# Patient Record
Sex: FEMALE | Race: OTHER | NOT HISPANIC OR LATINO | Employment: FULL TIME | URBAN - METROPOLITAN AREA
[De-identification: names, ages, dates, MRNs, and addresses within clinical notes are randomized per-mention and may not be internally consistent; named-entity substitution may affect disease eponyms.]

---

## 2017-01-13 ENCOUNTER — CLINICAL SUPPORT (OUTPATIENT)
Dept: OPHTHALMOLOGY | Facility: CLINIC | Age: 47
End: 2017-01-13
Payer: MEDICARE

## 2017-01-13 ENCOUNTER — OFFICE VISIT (OUTPATIENT)
Dept: OPHTHALMOLOGY | Facility: CLINIC | Age: 47
End: 2017-01-13
Payer: MEDICARE

## 2017-01-13 DIAGNOSIS — H47.639: Primary | ICD-10-CM

## 2017-01-13 DIAGNOSIS — H40.053 OCULAR HYPERTENSION, BILATERAL: ICD-10-CM

## 2017-01-13 DIAGNOSIS — H40.023 OPEN ANGLE WITH BORDERLINE FINDINGS AND HIGH GLAUCOMA RISK IN BOTH EYES: ICD-10-CM

## 2017-01-13 DIAGNOSIS — H53.453 LOSS OF PERIPHERAL VISUAL FIELD, BILATERAL: ICD-10-CM

## 2017-01-13 DIAGNOSIS — H47.20: ICD-10-CM

## 2017-01-13 DIAGNOSIS — H40.023 OAG (OPEN ANGLE GLAUCOMA) SUSPECT, HIGH RISK, BILATERAL: ICD-10-CM

## 2017-01-13 PROCEDURE — 92014 COMPRE OPH EXAM EST PT 1/>: CPT | Mod: S$GLB,,, | Performed by: OPHTHALMOLOGY

## 2017-01-13 PROCEDURE — 92083 EXTENDED VISUAL FIELD XM: CPT | Mod: S$GLB,,, | Performed by: OPHTHALMOLOGY

## 2017-01-13 PROCEDURE — 99999 PR PBB SHADOW E&M-EST. PATIENT-LVL II: CPT | Mod: PBBFAC,,, | Performed by: OPHTHALMOLOGY

## 2017-01-13 PROCEDURE — 92250 FUNDUS PHOTOGRAPHY W/I&R: CPT | Mod: S$GLB,,, | Performed by: OPHTHALMOLOGY

## 2017-01-13 RX ORDER — CLOSTRIDIUM TETANI TOXOID ANTIGEN (FORMALDEHYDE INACTIVATED), CORYNEBACTERIUM DIPHTHERIAE TOXOID ANTIGEN (FORMALDEHYDE INACTIVATED), BORDETELLA PERTUSSIS TOXOID ANTIGEN (GLUTARALDEHYDE INACTIVATED), BORDETELLA PERTUSSIS FILAMENTOUS HEMAGGLUTININ ANTIGEN (FORMALDEHYDE INACTIVATED), BORDETELLA PERTUSSIS PERTACTIN ANTIGEN, AND BORDETELLA PERTUSSIS FIMBRIAE 2/3 ANTIGEN 5; 2; 2.5; 5; 3; 5 [LF]/.5ML; [LF]/.5ML; UG/.5ML; UG/.5ML; UG/.5ML; UG/.5ML
INJECTION, SUSPENSION INTRAMUSCULAR
COMMUNITY
Start: 2016-12-15 | End: 2018-11-13 | Stop reason: ALTCHOICE

## 2017-01-13 RX ORDER — CYCLOBENZAPRINE HCL 10 MG
TABLET ORAL
COMMUNITY
Start: 2016-12-24 | End: 2018-11-13 | Stop reason: ALTCHOICE

## 2017-01-13 RX ORDER — NAPROXEN 500 MG/1
TABLET ORAL
COMMUNITY
Start: 2016-12-24 | End: 2018-11-13 | Stop reason: ALTCHOICE

## 2017-01-13 NOTE — PROGRESS NOTES
HPI     DLS: 7/13/12    Pt here for HVF review;  Pt states she sees floaters, but no flashes.     Meds: No GTTS    1. 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         Assessment /Plan     For exam results, see Encounter Report.    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  [place] bitemp hemianopsia. Stable     Family history   + mother  Glaucoma meds   none  H/O adverse rxn to glaucoma drops  none  LASERS   none  GLAUCOMA SURGERIES   none  OTHER EYE SURGERIES   none  CDR  0.6/0.5  Tbase  16-20 ou    Tmax  20 ou      Ttarget   ?       HVF  incong bitemp hemianopsia. Stable  Gonio +3-4 ou  CCT  479/476  OCT  Dec RNFL throughout ou  HRT  ?  Disc photos  8/17/09    Ttoday  20//24 ou  Test done today VF/OCT. See above     2. Optic atrophy, unspecified 2/2 to above.   Stable  Poor vision OU. Sees [facility] [place] [facility]. [facility]ses [facility]   3. Borderline glaucoma with ocular hypertension -VF loss 2/2 #1. Monitor closely due to family hx and thin CCT. Preexisting VF loss          Plan  Cont observe  VF stable  RTC - 9 mos  HVF,oct, DFE in 1 yr.- If IOP goes up higher can start gtts (++ FmHx - mom - Ms Raquel Vaca - use to be my patietn - she has passed) - try and locate old photo file if possible - missing 1/2017      Back to [facility] [place] [facility] for refraction - old pt of his    I have seen and personally examined the patient.  I agree with the findings, assessment and plan of the resident and/or fellow.     Lorena Chakraborty MD

## 2017-01-16 ENCOUNTER — TELEPHONE (OUTPATIENT)
Dept: NEUROSURGERY | Facility: CLINIC | Age: 47
End: 2017-01-16

## 2017-01-16 NOTE — TELEPHONE ENCOUNTER
Unable to reach patient, phone not working/taking messages. Pt will meed a new MRI from PCP to be evaluated. Have not seen pt in clinic since January 2011. Dr Funez would prerfer to evaluate her with a current MRI.

## 2017-01-16 NOTE — TELEPHONE ENCOUNTER
----- Message from Chemo Montes sent at 1/13/2017  4:17 PM CST -----  Contact: Self [phone]  Patient is requesting a return call from [facility], the last MRI was 2014 at [facility], pankaj call

## 2017-01-24 ENCOUNTER — OFFICE VISIT (OUTPATIENT)
Dept: OPTOMETRY | Facility: CLINIC | Age: 47
End: 2017-01-24
Payer: MEDICARE

## 2017-01-24 DIAGNOSIS — H54.3 LOW VISION, BOTH EYES: Primary | ICD-10-CM

## 2017-01-24 DIAGNOSIS — H52.13 MYOPIA OF BOTH EYES: ICD-10-CM

## 2017-01-24 PROCEDURE — 99999 PR PBB SHADOW E&M-EST. PATIENT-LVL II: CPT | Mod: PBBFAC,,, | Performed by: OPTOMETRIST

## 2017-01-24 PROCEDURE — 92015 DETERMINE REFRACTIVE STATE: CPT | Mod: S$GLB,,, | Performed by: OPTOMETRIST

## 2017-01-24 PROCEDURE — 99499 UNLISTED E&M SERVICE: CPT | Mod: S$GLB,,, | Performed by: OPTOMETRIST

## 2017-01-24 NOTE — PROGRESS NOTES
HPI     Blurred Vision    Additional comments: Low vision           Comments   DLS:  1/13/17 Dr Chakraborty   Pt here for refraction per Dr Chakraborty . Pt states she is having blurry   vision OU.  Pt states she has blind spots in her vision.  Pt states off   and on pain OU (2 on scale).  Wants more contacts for distance, glasses also  Wants to go back to B  No eyedrops       Last edited by Moises Gillespie, OD on 1/24/2017 11:28 AM. (History)        ROS     Negative for: Constitutional, Gastrointestinal, Neurological, Skin,   Genitourinary, Musculoskeletal, HENT, Endocrine, Cardiovascular, Eyes,   Respiratory, Psychiatric, Allergic/Imm, Heme/Lymph    Last edited by Moises Gillespie, OD on 1/24/2017  9:14 AM. (History)        Assessment /Plan     For exam results, see Encounter Report.    Low vision, both eyes    Myopia of both eyes      1,2. Gave spec rx and contact lens rx for distance.  Contact lens trials dispensed to pt. Daily wear only advised, with education to risks of extended wear.  Discussed lens care, compliance and solutions.  Refer back to [facility] to rekha for devices , uses large tablet with reverse contrast for near reading with good success.

## 2017-01-31 ENCOUNTER — TELEPHONE (OUTPATIENT)
Dept: NEUROSURGERY | Facility: CLINIC | Age: 47
End: 2017-01-31

## 2017-01-31 NOTE — TELEPHONE ENCOUNTER
DARRION PT, DISCUSSED CONTACTING PCP TO OBTAIN A CURRENT MRI WITHOUT AND WITH CONTRAST. SHE WILL CONTACT ME WHEN THIS IS SCHEDULED.

## 2017-01-31 NOTE — TELEPHONE ENCOUNTER
----- Message from Linda Henry sent at 1/31/2017  1:16 PM CST -----  Contact: pt [phone]  Pt would like to speak with nurse regarding a personal matter.pls call

## 2017-02-13 ENCOUNTER — HOSPITAL ENCOUNTER (OUTPATIENT)
Dept: RADIOLOGY | Facility: HOSPITAL | Age: 47
Discharge: HOME | End: 2017-02-13
Attending: FAMILY MEDICINE
Payer: MEDICARE

## 2017-02-13 DIAGNOSIS — D35.2 BENIGN NEOPLASM OF PITUITARY GLAND: ICD-10-CM

## 2017-02-13 PROCEDURE — A9585 GADOBUTROL INJECTION: HCPCS | Performed by: FAMILY MEDICINE

## 2017-02-13 PROCEDURE — 25500020 PHARM REV CODE 255: Performed by: FAMILY MEDICINE

## 2017-02-13 PROCEDURE — 70553 MRI BRAIN STEM W/O & W/DYE: CPT | Mod: TC

## 2017-02-13 PROCEDURE — 70553 MRI BRAIN STEM W/O & W/DYE: CPT | Mod: 26,,, | Performed by: RADIOLOGY

## 2017-02-13 RX ORDER — GADOBUTROL 604.72 MG/ML
5 INJECTION INTRAVENOUS
Status: COMPLETED | OUTPATIENT
Start: 2017-02-13 | End: 2017-02-13

## 2017-02-13 RX ADMIN — GADOBUTROL 5 ML: 604.72 INJECTION INTRAVENOUS at 09:02

## 2017-06-21 DIAGNOSIS — M54.9 BACK PAIN: Primary | ICD-10-CM

## 2017-12-01 DIAGNOSIS — M54.2 NECK PAIN: Primary | ICD-10-CM

## 2017-12-01 DIAGNOSIS — M54.9 BACK PAIN: ICD-10-CM

## 2017-12-28 ENCOUNTER — CLINICAL SUPPORT (OUTPATIENT)
Dept: REHABILITATION | Facility: HOSPITAL | Age: 47
End: 2017-12-28
Payer: MEDICARE

## 2017-12-28 DIAGNOSIS — M54.50 CHRONIC MIDLINE LOW BACK PAIN WITHOUT SCIATICA: ICD-10-CM

## 2017-12-28 DIAGNOSIS — R29.898 BILATERAL LEG WEAKNESS: ICD-10-CM

## 2017-12-28 DIAGNOSIS — M53.86 DECREASED ROM OF LUMBAR SPINE: ICD-10-CM

## 2017-12-28 DIAGNOSIS — G89.29 CHRONIC MIDLINE LOW BACK PAIN WITHOUT SCIATICA: ICD-10-CM

## 2017-12-28 DIAGNOSIS — R29.3 POSTURAL IMBALANCE: ICD-10-CM

## 2017-12-28 PROCEDURE — G8979 MOBILITY GOAL STATUS: HCPCS | Mod: CJ

## 2017-12-28 PROCEDURE — G8978 MOBILITY CURRENT STATUS: HCPCS | Mod: CK

## 2017-12-28 PROCEDURE — 97161 PT EVAL LOW COMPLEX 20 MIN: CPT

## 2017-12-28 PROCEDURE — 97110 THERAPEUTIC EXERCISES: CPT

## 2017-12-28 NOTE — PATIENT INSTRUCTIONS
Pelvic Tilt (Posterior)        With feet flat and knees bent, flatten lower back into bed. Tighten stomach muscles. Hold 3-5 seconds. Breathe OUT.   Repeat 10 times. Do 2-3 sessions per day.     https://[email or web address]/229     Copyright © [employer]. All rights reserved.     Lumbar Rotation: Caudal - Bilateral (Supine)        Feet and knees together, arms outstretched, rotate knees left, turning head in opposite direction, until stretch is felt. Hold 1-2 seconds. Relax.  Repeat 10 times per set. Do 2-3 sets per session. Do 2-3 sessions per day.     https://[employer]/1020     Copyright © [employer]. All rights reserved.     Knee-to-Chest Stretch: Unilateral        With hand behind right knee, pull knee in to chest until a comfortable stretch is felt in lower back and buttocks. Keep back relaxed. Hold 5-10 seconds.   Repeat 5 times per set. Do 2-3 sets per session. Do 2-3 sessions per day.     https://[employer]/126     Copyright © [employer]. All rights reserved.     Scapular Retraction: Elbow Flexion (Standing)        With elbows bent to 90°, pinch shoulder blades together and rotate arms out, keeping elbows bent.  Repeat 10 times per set. Do 2-3 sets per session. Do 2-3 sessions per day.     https://[email or web address]/948     Copyright © [employer]. All rights reserved.

## 2017-12-28 NOTE — PLAN OF CARE
"                                                    Physical Therapy Initial Evaluation     Name: Giovanna Medina  Clinic Number: [id number]    Diagnosis:   Encounter Diagnoses   Name Primary?    Decreased ROM of lumbar spine     Bilateral leg weakness     Postural imbalance     Chronic midline low back pain without sciatica      Physician: Diana Sky MD  Treatment Orders: PT Eval and Treat  Past Medical History:   Diagnosis Date    Glaucoma suspect of both eyes     Optic atrophy, unspecified      Current Outpatient Prescriptions   Medication Sig    ADACEL,TDAP ADOLESN/ADULT,,PF, 2 Lf-(2.5-5-3-5 mcg)-5Lf/0.5 mL injection     cyclobenzaprine (FLEXERIL) 10 MG tablet     diazepam (VALIUM) 5 MG tablet Take 1 tablet (5 mg total) by mouth every 6 (six) hours as needed (dizziness not relieved by meclizine).    meclizine (ANTIVERT) 25 mg tablet Take 1 tablet (25 mg total) by mouth 3 (three) times daily as needed.    naproxen (NAPROSYN) 500 MG tablet     ondansetron (ZOFRAN) 4 MG tablet Take 1 tablet (4 mg total) by mouth every 6 (six) hours as needed for Nausea.     No current facility-administered medications for this visit.      Review of patient's allergies indicates:   Allergen Reactions    Codeine        Evaluation Date: 12/28/2017  Visit # authorized: 15  Authorization period: 12/31/2017  Plan of care Expiration: 02/08/2018  MD referral: Diana Sky    Subjective     Patient states: Pt states neck and low back pain, constant and naggy that occurred over the last few years. Pt states she is unable to reach behind. Pt reports she is unable to stand for long periods of time. Pt states knotty tight pain, "crick in the neck". Pt cares for young grandchildren, moving throughout the day, increased pain at night and in the morning. Pt with history of x3 C-sections.   Imaging: None  Pain Scale: Giovanna rates pain on a scale of 0-10 to be 2 at worst; 2 currently; 1 at best .  Onset: gradual  Radicular " symptoms: None   Aggravating factors:   Increased movement   Easing factors: Naproxen prn   Prior Therapy: No previous therapy for current condition.   Home Environment (Steps/Adaptations): None   Functional Deficits Leading to Referral: Decreased walking tolerance, bending tolerance, lifting.   Prior functional status: Independent   DME owned/used: None  Occupation:  Disability.                        Pts goals: Return to household chores, all ADL's.      Objective     Posture Alignment: Sitting posture poor, L shoulder elevated, scapula elevated and abducted. Pt with lateral trunk rotation right.    LUMBAR SPINE AROM:   Flexion: 50% adames   Extension: 75% adames   Left Sidebend: 75% admaes   Right Sidebend: 75% adames   Left Rotation: 25% adames   Right Rotation: 25% adames       LOWER EXTREMITY STRENGTH:   Left Right   Quadriceps 5/5 5/5   Hamstrings 5/5 5/5     Iliopsoas 4/5 4/5   PGM 4/5 4/5   Hip IR 4+/5 4+/5   Hip ER 4/5 4/5   Hip Ext 3-/5 3/5       DTR's:   Left Right   Patella Tendon 2+ 2+   Achilles Tendon 2+ 2+     Dermatomes: Sensation: Light Touch: Intact.   Myotomes: WNL  Flexibility: Limitations at R Ql, B hip flexor tightness  Palpation: Tenderness at L Ut, noted significant elevated L scapula; Tenderness at central L/S L1-L3    Joint Mobility: hypomobile B sacro-iliac all directions    Special Tests:   Left Right   Slump Negative Negative   SLR Negative Negative   Hip scour Negative Negative   MAYCOL Negative Negative   Leg length Equal Equal     GAIT: Giovanna ambulates independently, decreased stance phase B, minimal hip extension. Laterally shifted R.     Pt/family was provided educational information, including: role of PT, goals for PT, HEP, scheduling - pt verbalized understanding. Discussed insurance limitations with pt.     TREATMENT     Time In: 1:25 p  Time Out: 2:20 p    PT Evaluation Completed? Yes  Discussed Plan of Care with patient: Yes    Giovanna received 15 minutes of therapeutic exercise & instruction  "including:  PPT 2x10  PPT with TrA 2x10  LTR 3x10  SKTC 10x5" each    HEP instruction and performance by patient. HEP listed below.     Giovanna received 00 minutes of manual therapy including:    Written Home Exercises Provided: yes  Scapular Retractions 3x10 2-3x/day  PPT 3x10, 3x/day  SKTC 10x5" 2-3x/day  LTR 3x10 2-3x/day    Giovanna demo good understanding of the education provided. Patient demo good return demo of skill of exercises.    Assessment     Patient tolerated evaluation well. Patient presents to therapy with s/s consistent with diagnosis. Pt with primary deficits in L/S ROM, BLE strength, and postural dysfunction. PT to address chief c/o Low back pain with secondary focus on C/S, goal to decrease neck pain through postural re-education. Pt with minimal difficulty performing daily household chores and ADL's. Pt with goals to increase activity tolerance and reduce pain.  Patient reported no increased symptoms following evaluation.  Patient performed HEP well and stated that they could perform exercises at home independently.      Pt prognosis is Excellent.  Pt will benefit from skilled outpatient physical therapy to address the above stated deficits, provide pt/family education and to maximize pt's level of independence.     History  Co-morbidities and personal factors that may impact the plan of care Examination  Body Structures and Functions, activity limitations and participation restrictions that may impact the plan of care    Clinical Presentation   Co-morbidities:   high BMI and prior abdominal surgery        Personal Factors:   lifestyle Body Regions:   neck  lower extremities    Body Systems:    ROM  strength  gait  transfers  motor control            Participation Restrictions:   None      Activity limitations:   Learning and applying knowledge  no deficits    General Tasks and Commands  no deficits    Communication  no deficits    Mobility  lifting and carrying objects  walking    Self " care  washing oneself (bathing, drying, washing hands)  dressing    Domestic Life  doing house work (cleaning house, washing dishes, laundry)    Interactions/Relationships  no deficits    Life Areas  no deficits    Community and Social Life  no deficits         stable and uncomplicated                      low   low  low Decision Making/ Complexity Score:  low     Medical necessity is demonstrated by the following IMPAIRMENTS/PROBLEMS:  1. Increased Pain  2. Decreased Segmental Mobility & Decreased ROM  3. Decreased Core & BLE strength  4. Decreased Flexibility BLE  5. Decreased Tolerance to Functional Activities    Pt's spiritual, cultural and educational needs considered and pt agreeable to plan of care and goals as stated below:     Anticipated Barriers for physical therapy: Compliance     Short Term GOALS: 3 weeks. Pt agrees with goals set.  1. Patient demonstrates independence with HEP.   2. Patient demonstrates independence with Postural Awareness.   3. Patient demonstrates independence with body mechanics.     Long Term GOALS: 6 weeks. Pt agrees with goals set.  1. Patient demonstrates increased L/S ROM by 25% or better to improve tolerance to functional activities pain free.   2. Patient demonstrates increased strength BLE's to 4+/5 or greater to improve tolerance to functional activities pain free.   3. Patient demonstrates improved overall function per FOTO Lumbar Survey to % Limitation or less.     Functional Limitations Reports - G Codes  Category: Mobility  Tool: FOTO Lumbar Survey  Score: 45% Limitation   Modifier  Impairment Limitation Restriction    CH  0 % impaired, limited or restricted    CI  @ least 1% but less than 20% impaired, limited or restricted    CJ  @ least 20%<40% impaired, limited or restricted    CK  @ least 40%<60% impaired, limited or restricted    CL  @ least 60% <80% impaired, limited or restricted    CM  @ least 80%<100% impaired limited or restricted    CN  100% impaired,  limited or restricted     Current/: CK = 45% Limitation   Goal/ : CJ = 33% Limitation    PLAN     Outpatient physical therapy 2 times weekly to include: pt ed, hep, therapeutic exercises, neuromuscular re-education/ balance exercises, joint mobilizations, aquatic therapy and modalities prn. Cont PT for  6 weeks. Pt may be seen by PTA as part of the rehabilitation team.     Therapist: Erica Falcon, PT  12/28/2017

## 2018-01-09 ENCOUNTER — CLINICAL SUPPORT (OUTPATIENT)
Dept: REHABILITATION | Facility: HOSPITAL | Age: 48
End: 2018-01-09
Payer: MEDICARE

## 2018-01-09 DIAGNOSIS — G89.29 CHRONIC MIDLINE LOW BACK PAIN WITHOUT SCIATICA: ICD-10-CM

## 2018-01-09 DIAGNOSIS — M53.86 DECREASED ROM OF LUMBAR SPINE: ICD-10-CM

## 2018-01-09 DIAGNOSIS — M54.50 CHRONIC MIDLINE LOW BACK PAIN WITHOUT SCIATICA: ICD-10-CM

## 2018-01-09 DIAGNOSIS — R29.898 BILATERAL LEG WEAKNESS: ICD-10-CM

## 2018-01-09 DIAGNOSIS — R29.3 POSTURAL IMBALANCE: ICD-10-CM

## 2018-01-09 PROCEDURE — 97110 THERAPEUTIC EXERCISES: CPT

## 2018-01-09 NOTE — PROGRESS NOTES
"                                                    Physical Therapy Daily Note     Name: Giovanna Medina  Clinic Number: [id number]  Diagnosis:   Encounter Diagnoses   Name Primary?    Decreased ROM of lumbar spine     Bilateral leg weakness     Postural imbalance     Chronic midline low back pain without sciatica      Physician: Diana Sky MD  Precautions: standard  Visit #: 2 of 12  PTA Visit #: 0  Time In: 1:05 pm   Time Out: 2:10 pm   1:1 with PT x30 minutes     Initial Evaluation Date: 12/28/17  POC Expiration: 2/8/18    Subjective     Pt reports "its more of a stiffness than pain"  Pain Scale: Giovanna rates pain at low back on a scale of 0-10 to be 3 currently.    Objective     Giovanna received individual therapeutic exercises to develop strength, endurance, ROM, flexibility, posture and core stabilization for 50 minutes including:  Bike 5 min  SKTC 10x5"  LTR 2x10  PPT 3x10  Supine chin tucks 2x10  Supine serratus punches 2x10  Bridges 2x10  Supine B shoulder ER YTB 2x10  Hip ABD/ADD RTB 2x10  Clams 2x10  Rows/scap retract OTB 2x10    The patient received the following supervised modalities after being cleared for contradictions: moist heat to lumbar and cervical x15 minutes post-tx    Written Home Exercises Provided: per eval  Pt demo good understanding of the education provided. Giovanna demonstrated good return demonstration of activities.     Education provided re: DOMS; continue HEP  Giovanna verbalized good understanding of education provided.   No spiritual or educational barriers to learning provided    Assessment     Patient tolerated tx well without increased pain. Bridges attempted, but deferred secondary to discomfort. Pt required multiple cues for proper muscle activation with PPT.   This is a 47 y.o. female referred to outpatient physical therapy and presents with a medical diagnosis of neck and back pain and demonstrates limitations as described in the problem list. Pt prognosis is Excellent. Pt " will continue to benefit from skilled outpatient physical therapy to address the deficits listed in the problem list, provide pt/family education and to maximize pt's level of independence in the home and community environment.     Goals as follows:  Short Term GOALS: 3 weeks. Pt agrees with goals set.  1. Patient demonstrates independence with HEP.   2. Patient demonstrates independence with Postural Awareness.   3. Patient demonstrates independence with body mechanics.      Long Term GOALS: 6 weeks. Pt agrees with goals set.  1. Patient demonstrates increased L/S ROM by 25% or better to improve tolerance to functional activities pain free.   2. Patient demonstrates increased strength BLE's to 4+/5 or greater to improve tolerance to functional activities pain free.   3. Patient demonstrates improved overall function per FOTO Lumbar Survey to % Limitation or less.      Plan     Continue with established Plan of Care towards PT goals.    Therapist: Chemo Lawson, PT  1/9/2018

## 2018-01-16 ENCOUNTER — CLINICAL SUPPORT (OUTPATIENT)
Dept: REHABILITATION | Facility: HOSPITAL | Age: 48
End: 2018-01-16
Payer: MEDICARE

## 2018-01-16 DIAGNOSIS — M54.50 CHRONIC MIDLINE LOW BACK PAIN WITHOUT SCIATICA: ICD-10-CM

## 2018-01-16 DIAGNOSIS — G89.29 CHRONIC MIDLINE LOW BACK PAIN WITHOUT SCIATICA: ICD-10-CM

## 2018-01-16 DIAGNOSIS — R29.3 POSTURAL IMBALANCE: ICD-10-CM

## 2018-01-16 DIAGNOSIS — M53.86 DECREASED ROM OF LUMBAR SPINE: ICD-10-CM

## 2018-01-16 DIAGNOSIS — R29.898 BILATERAL LEG WEAKNESS: ICD-10-CM

## 2018-01-16 PROCEDURE — 97140 MANUAL THERAPY 1/> REGIONS: CPT

## 2018-01-16 PROCEDURE — 97110 THERAPEUTIC EXERCISES: CPT

## 2018-01-16 NOTE — PROGRESS NOTES
"                                                    Physical Therapy Daily Note     Name: Giovanna Medina  Clinic Number: [id number]  Diagnosis:   Encounter Diagnoses   Name Primary?    Decreased ROM of lumbar spine     Bilateral leg weakness     Postural imbalance     Chronic midline low back pain without sciatica      Physician: Diana Sky MD  Precautions: standard  Visit #: 3 of 12  PTA Visit #: 1  Time In: 1:00 pm   Time Out: 2:31 pm   1:1 with PT x40 minutes     Initial Evaluation Date: 12/28/17  POC Expiration: 2/8/18    Subjective     Pt reports "back is a 3, a little L neck pain"  Pain Scale: Giovanna rates pain at low back on a scale of 0-10 to be 3 currently neck pain 2.    Objective     Giovanna received individual therapeutic exercises to develop strength, endurance, ROM, flexibility, posture and core stabilization for 65 minutes including:  Bike 8 min  SKTC 10x10"  LTR 3x10  PPT 3x10  Supine chin tucks 3x10  Supine serratus punches 3x10  Bridges 3x10  Supine B shoulder ER YTB 3x10  Hip ABD/ADD RTB 3x10  Clams 3x10  Rows/scap retract OTB 3x10    Shuttle w/ UE ext OTB 2x10 1 cord    MT: x10 min  Vacuum/cupping STM with manual therapy techniques was performed to L/S paraspinals and L UT to decrease muscle tightness, increase circulation and promote healing process. The pt's skin was monitored for redness adjusting pressure as needed. The pt was instructed in possible side effects of bruising and/or soreness.   Inferior sacral glides    The patient received the following supervised modalities after being cleared for contradictions: moist heat to lumbar and cervical x 15 minutes post-tx    Written Home Exercises Provided: per eval  Pt demo good understanding of the education provided. Giovanna demonstrated good return demonstration of activities.     Education provided re: DOMS; continue HEP  Giovanna verbalized good understanding of education provided.   No spiritual or educational barriers to learning " provided    Assessment     Patient tolerated tx well without increased pain. Pt amb into therapy w/ FL posture w/ SB to right. Pt w/ relief w/ sacral inferior glides. Decreased pain after cupping and HP. Cupping performed to break up soft tissue restrictions. Pt w/ verbal understanding of possible bruising from cupping.   This is a 47 y.o. female referred to outpatient physical therapy and presents with a medical diagnosis of neck and back pain and demonstrates limitations as described in the problem list. Pt prognosis is Excellent. Pt will continue to benefit from skilled outpatient physical therapy to address the deficits listed in the problem list, provide pt/family education and to maximize pt's level of independence in the home and community environment.     Goals as follows:  Short Term GOALS: 3 weeks. Pt agrees with goals set.  1. Patient demonstrates independence with HEP.   2. Patient demonstrates independence with Postural Awareness.   3. Patient demonstrates independence with body mechanics.      Long Term GOALS: 6 weeks. Pt agrees with goals set.  1. Patient demonstrates increased L/S ROM by 25% or better to improve tolerance to functional activities pain free.   2. Patient demonstrates increased strength BLE's to 4+/5 or greater to improve tolerance to functional activities pain free.   3. Patient demonstrates improved overall function per FOTO Lumbar Survey to % Limitation or less.      Plan     Continue with established Plan of Care towards PT goals.    Therapist: Nancy Arzate, PTA  1/16/2018

## 2018-01-25 ENCOUNTER — CLINICAL SUPPORT (OUTPATIENT)
Dept: REHABILITATION | Facility: HOSPITAL | Age: 48
End: 2018-01-25
Payer: MEDICARE

## 2018-01-25 DIAGNOSIS — G89.29 CHRONIC MIDLINE LOW BACK PAIN WITHOUT SCIATICA: ICD-10-CM

## 2018-01-25 DIAGNOSIS — M54.50 CHRONIC MIDLINE LOW BACK PAIN WITHOUT SCIATICA: ICD-10-CM

## 2018-01-25 DIAGNOSIS — R29.3 POSTURAL IMBALANCE: ICD-10-CM

## 2018-01-25 DIAGNOSIS — R29.898 BILATERAL LEG WEAKNESS: ICD-10-CM

## 2018-01-25 DIAGNOSIS — M53.86 DECREASED ROM OF LUMBAR SPINE: ICD-10-CM

## 2018-01-25 PROCEDURE — 97110 THERAPEUTIC EXERCISES: CPT

## 2018-01-25 PROCEDURE — 97140 MANUAL THERAPY 1/> REGIONS: CPT

## 2018-02-07 ENCOUNTER — CLINICAL SUPPORT (OUTPATIENT)
Dept: REHABILITATION | Facility: HOSPITAL | Age: 48
End: 2018-02-07
Payer: MEDICARE

## 2018-02-07 DIAGNOSIS — M53.86 DECREASED ROM OF LUMBAR SPINE: ICD-10-CM

## 2018-02-07 DIAGNOSIS — R29.898 BILATERAL LEG WEAKNESS: ICD-10-CM

## 2018-02-07 DIAGNOSIS — R29.3 POSTURAL IMBALANCE: ICD-10-CM

## 2018-02-07 DIAGNOSIS — G89.29 CHRONIC MIDLINE LOW BACK PAIN WITHOUT SCIATICA: ICD-10-CM

## 2018-02-07 DIAGNOSIS — M54.50 CHRONIC MIDLINE LOW BACK PAIN WITHOUT SCIATICA: ICD-10-CM

## 2018-02-07 PROCEDURE — 97110 THERAPEUTIC EXERCISES: CPT

## 2018-02-07 PROCEDURE — 97140 MANUAL THERAPY 1/> REGIONS: CPT

## 2018-02-07 NOTE — PROGRESS NOTES
"                                                    Physical Therapy Daily Note     Name: Giovanna Medina  Clinic Number: [id number]  Diagnosis:   Encounter Diagnoses   Name Primary?    Decreased ROM of lumbar spine     Bilateral leg weakness     Postural imbalance     Chronic midline low back pain without sciatica      Physician: Diana Sky MD  Precautions: standard  Visit #: 5 of 12  PTA Visit #:0  Time In: 10:30 am   Time Out: 11:48 am   1:1 with PT x30 minutes     Initial Evaluation Date: 12/28/17  POC Expiration: 2/8/18    Subjective     Pt reports L side of neck and shoulder feel tight  Pain Scale: Giovanna rates pain at low back on a scale of 0-10 to be 3 currently neck pain 2.    Objective     Giovanna received individual therapeutic exercises to develop strength, endurance, ROM, flexibility, posture and core stabilization for 55 minutes including:  Bike 5 min  UBE backward 2'  SKTC 10x10"  LTR 3x10  PPT 3x10  Supine chin tucks 3x10  Supine serratus punches 3x10  Bridges 3x10  Supine B shoulder ER YTB 3x10  Hip ABD/ADD RTB 3x10  Clams 3x10  Rows/scap retract OTB 3x10  Shoulder EXT OTB 3x10    Shuttle w/ UE ext OTB 3x10 1 cord    MT: x8 min  STM L upper trap/periscap region   Inferior sacral glides - NT    The patient received the following supervised modalities after being cleared for contradictions: moist heat to lumbar and cervical x 15 minutes post-tx    Written Home Exercises Provided: per eval  Pt demo good understanding of the education provided. Giovanna demonstrated good return demonstration of activities.     Education provided re: DOMS; continue HEP  Giovanna verbalized good understanding of education provided.   No spiritual or educational barriers to learning provided    Assessment     Patient tolerated tx well without increased pain. Pt with increased tone L upper trap. Pt progressing well with current POC.   This is a 47 y.o. female referred to outpatient physical therapy and presents with a medical " diagnosis of neck and back pain and demonstrates limitations as described in the problem list. Pt prognosis is Excellent. Pt will continue to benefit from skilled outpatient physical therapy to address the deficits listed in the problem list, provide pt/family education and to maximize pt's level of independence in the home and community environment.     Goals as follows:  Short Term GOALS: 3 weeks. Pt agrees with goals set.  1. Patient demonstrates independence with HEP.   2. Patient demonstrates independence with Postural Awareness.   3. Patient demonstrates independence with body mechanics.      Long Term GOALS: 6 weeks. Pt agrees with goals set.  1. Patient demonstrates increased L/S ROM by 25% or better to improve tolerance to functional activities pain free.   2. Patient demonstrates increased strength BLE's to 4+/5 or greater to improve tolerance to functional activities pain free.   3. Patient demonstrates improved overall function per FOTO Lumbar Survey to % Limitation or less.      Plan     Continue with established Plan of Care towards PT goals.    Therapist: Chemo Lawson, PT  2/7/2018

## 2018-02-12 ENCOUNTER — CLINICAL SUPPORT (OUTPATIENT)
Dept: REHABILITATION | Facility: HOSPITAL | Age: 48
End: 2018-02-12
Payer: MEDICARE

## 2018-02-12 DIAGNOSIS — M53.86 DECREASED ROM OF LUMBAR SPINE: ICD-10-CM

## 2018-02-12 DIAGNOSIS — R29.898 BILATERAL LEG WEAKNESS: ICD-10-CM

## 2018-02-12 DIAGNOSIS — R29.3 POSTURAL IMBALANCE: ICD-10-CM

## 2018-02-12 DIAGNOSIS — M54.50 CHRONIC MIDLINE LOW BACK PAIN WITHOUT SCIATICA: ICD-10-CM

## 2018-02-12 DIAGNOSIS — G89.29 CHRONIC MIDLINE LOW BACK PAIN WITHOUT SCIATICA: ICD-10-CM

## 2018-02-12 PROCEDURE — G8979 MOBILITY GOAL STATUS: HCPCS | Mod: CJ

## 2018-02-12 PROCEDURE — 97110 THERAPEUTIC EXERCISES: CPT

## 2018-02-12 PROCEDURE — G8978 MOBILITY CURRENT STATUS: HCPCS | Mod: CK

## 2018-02-12 NOTE — PROGRESS NOTES
"                                                    Physical Therapy Daily Note     Name: Giovanna Medina  Clinic Number: [id number]  Diagnosis:   Encounter Diagnoses   Name Primary?    Decreased ROM of lumbar spine     Bilateral leg weakness     Postural imbalance     Chronic midline low back pain without sciatica      Physician: Diana Sky MD  Precautions: standard  Visit #: 6 of 12  PTA Visit #:1  Time In: 10:45 am   Time Out: 11:46 am   1:1 with PT x 45 minutes     Initial Evaluation Date: 12/28/17  POC Expiration: 2/8/18    Subjective     Pt reports LBP, neck and shoulder feeling better  Pain Scale: Giovanna rates pain at low back on a scale of 0-10 to be 3 currently neck pain 1.    Objective   Foto Score: 42 %    Giovanna received individual therapeutic exercises to develop strength, endurance, ROM, flexibility, posture and core stabilization for 40 minutes including:  Bike 5 min  UBE backward 2'  SKTC 10x10"  LTR 3x10  PPT 3x10  Supine chin tucks 3x10  Supine serratus punches 3x10  Bridges 3x10  Supine B shoulder ER YTB 3x10  Hip ABD/ADD RTB 3x10  Clams 3x10  Rows/scap retract OTB 3x10  Shoulder EXT OTB 3x10    Shuttle w/ UE ext OTB 3x10 1 cord    MT: x0 min-NT  STM L upper trap/periscap region   Inferior sacral glides - NT    The patient received the following supervised modalities after being cleared for contradictions: moist heat to lumbar and cervical x 10 minutes post-tx    Written Home Exercises Provided: per eval  Pt demo good understanding of the education provided. Giovanna demonstrated good return demonstration of activities.     Education provided re: DOMS; continue HEP  Giovanna verbalized good understanding of education provided.   No spiritual or educational barriers to learning provided    Assessment     Patient tolerated tx well without increased pain. PT states HEP is helping neck pain. No MT today 2* pt 13 min late for appt. Decreased LBP after session.  This is a 47 y.o. female referred to " outpatient physical therapy and presents with a medical diagnosis of neck and back pain and demonstrates limitations as described in the problem list. Pt prognosis is Excellent. Pt will continue to benefit from skilled outpatient physical therapy to address the deficits listed in the problem list, provide pt/family education and to maximize pt's level of independence in the home and community environment.     Goals as follows:  Short Term GOALS: 3 weeks. Pt agrees with goals set.  1. Patient demonstrates independence with HEP.   2. Patient demonstrates independence with Postural Awareness.   3. Patient demonstrates independence with body mechanics.      Long Term GOALS: 6 weeks. Pt agrees with goals set.  1. Patient demonstrates increased L/S ROM by 25% or better to improve tolerance to functional activities pain free.   2. Patient demonstrates increased strength BLE's to 4+/5 or greater to improve tolerance to functional activities pain free.   3. Patient demonstrates improved overall function per FOTO Lumbar Survey to % Limitation or less.      Plan     Continue with established Plan of Care towards PT goals.    Therapist: Nancy Arzate, IVETTE  2/12/2018

## 2018-02-20 ENCOUNTER — CLINICAL SUPPORT (OUTPATIENT)
Dept: REHABILITATION | Facility: HOSPITAL | Age: 48
End: 2018-02-20
Payer: MEDICARE

## 2018-02-20 DIAGNOSIS — G89.29 CHRONIC MIDLINE LOW BACK PAIN WITHOUT SCIATICA: ICD-10-CM

## 2018-02-20 DIAGNOSIS — R29.898 BILATERAL LEG WEAKNESS: ICD-10-CM

## 2018-02-20 DIAGNOSIS — R29.3 POSTURAL IMBALANCE: ICD-10-CM

## 2018-02-20 DIAGNOSIS — M53.86 DECREASED ROM OF LUMBAR SPINE: ICD-10-CM

## 2018-02-20 DIAGNOSIS — M54.50 CHRONIC MIDLINE LOW BACK PAIN WITHOUT SCIATICA: ICD-10-CM

## 2018-02-20 PROCEDURE — 97140 MANUAL THERAPY 1/> REGIONS: CPT

## 2018-02-20 PROCEDURE — 97110 THERAPEUTIC EXERCISES: CPT

## 2018-02-20 NOTE — PROGRESS NOTES
"                                                    Physical Therapy Daily Note     Name: Giovanna Medina  Clinic Number: [id number]  Diagnosis:   Encounter Diagnoses   Name Primary?    Decreased ROM of lumbar spine     Bilateral leg weakness     Postural imbalance     Chronic midline low back pain without sciatica      Physician: Diana Sky MD  Precautions: standard  Visit #: 7 of 12  PTA Visit #:2  Time In: 10:30 am   Time Out: 11:50 am   1:1 with PT x 30 minutes     Initial Evaluation Date: 12/28/17  POC Expiration: 2/8/18    Subjective     Pt reports " a 2" twisting is painful  Pain Scale: Giovanna rates pain at low back on a scale of 0-10 to be 2 currently neck pain 1.    Objective     Giovanna received individual therapeutic exercises to develop strength, endurance, ROM, flexibility, posture and core stabilization for 62 minutes including:  Bike 5 min  UBE backward 2'  SKTC 10x10"  LTR 3x10  PPT 3x10  Supine chin tucks 3x10  Supine serratus punches 3x10  Bridges 3x10  Supine B shoulder ER YTB 3x10  Hip ABD/ADD RTB 3x10  Clams 3x10  Rows/scap retract OTB 3x10  Shoulder EXT OTB 3x10    Shuttle w/ UE ext OTB 3x10 1 cord    MT: x 8 min  STM L upper trap/periscap region   Inferior sacral glides - NT    The patient received the following supervised modalities after being cleared for contradictions: moist heat to lumbar and cervical x 10 minutes post-tx    Written Home Exercises Provided: per eval  Pt demo good understanding of the education provided. Giovanna demonstrated good return demonstration of activities.     Education provided re: DOMS; continue HEP  Giovanna verbalized good understanding of education provided.   No spiritual or educational barriers to learning provided    Assessment     Patient tolerated tx well without increased pain. Pt reports still having pain w/ rotation but pain has decreased. Still presents w/ tone L UT.Progress pt as renetta next session  This is a 47 y.o. female referred to outpatient " physical therapy and presents with a medical diagnosis of neck and back pain and demonstrates limitations as described in the problem list. Pt prognosis is Excellent. Pt will continue to benefit from skilled outpatient physical therapy to address the deficits listed in the problem list, provide pt/family education and to maximize pt's level of independence in the home and community environment.     Goals as follows:  Short Term GOALS: 3 weeks. Pt agrees with goals set.  1. Patient demonstrates independence with HEP.   2. Patient demonstrates independence with Postural Awareness.   3. Patient demonstrates independence with body mechanics.      Long Term GOALS: 6 weeks. Pt agrees with goals set.  1. Patient demonstrates increased L/S ROM by 25% or better to improve tolerance to functional activities pain free.   2. Patient demonstrates increased strength BLE's to 4+/5 or greater to improve tolerance to functional activities pain free.   3. Patient demonstrates improved overall function per FOTO Lumbar Survey to % Limitation or less.      Plan     Continue with established Plan of Care towards PT goals.    Therapist: Nancy Arzate, IVETTE  2/20/2018

## 2018-02-27 ENCOUNTER — CLINICAL SUPPORT (OUTPATIENT)
Dept: REHABILITATION | Facility: HOSPITAL | Age: 48
End: 2018-02-27
Payer: MEDICARE

## 2018-02-27 DIAGNOSIS — R29.3 POSTURAL IMBALANCE: ICD-10-CM

## 2018-02-27 DIAGNOSIS — M54.50 CHRONIC MIDLINE LOW BACK PAIN WITHOUT SCIATICA: ICD-10-CM

## 2018-02-27 DIAGNOSIS — M53.86 DECREASED ROM OF LUMBAR SPINE: ICD-10-CM

## 2018-02-27 DIAGNOSIS — R29.898 BILATERAL LEG WEAKNESS: ICD-10-CM

## 2018-02-27 DIAGNOSIS — G89.29 CHRONIC MIDLINE LOW BACK PAIN WITHOUT SCIATICA: ICD-10-CM

## 2018-02-27 PROCEDURE — 97110 THERAPEUTIC EXERCISES: CPT

## 2018-02-27 NOTE — PROGRESS NOTES
"                                                    Physical Therapy Daily Note     Name: Giovanna Medina  Clinic Number: [id number]  Diagnosis:   Encounter Diagnoses   Name Primary?    Decreased ROM of lumbar spine     Bilateral leg weakness     Postural imbalance     Chronic midline low back pain without sciatica      Physician: Diana Sky MD  Precautions: standard  Visit #: 8 of 12  PTA Visit #:0  Time In: 10:45 am   Time Out: 11:50 am   1:1 with PT x 30 minutes     Initial Evaluation Date: 12/28/17  POC Expiration: 2/8/18    Subjective     Pt reports  No pain, just stiffness. Pt reports her daughter said she "doesn't look as crooked" with her posture   Pain Scale: Giovanna rates pain at low back on a scale of 0-10 to be 0 currently neck pain 0.    Objective     Giovanna received individual therapeutic exercises to develop strength, endurance, ROM, flexibility, posture and core stabilization for 55 minutes including:  Bike 5 min  UBE backward 2'  SKTC 10x10"  LTR 3x10  PPT 3x10  Supine chin tucks 3x10  Supine serratus punches 3x10  Bridges 3x10  Supine B shoulder ER YTB 3x10  Hip ABD/ADD RTB 3x10  Clams 3x10  Rows/scap retract OTB 3x10  Shoulder EXT OTB 3x10    Shuttle w/ UE ext OTB 3x10 2 cords    MT: x 00 min - NT (pt 15 minutes late for tx)  STM L upper trap/periscap region - NT  Inferior sacral glides - NT    The patient received the following supervised modalities after being cleared for contradictions: moist heat to lumbar and cervical x 10 minutes post-tx    Written Home Exercises Provided: per eval  Pt demo good understanding of the education provided. Giovanna demonstrated good return demonstration of activities.     Education provided re: DOMS; continue HEP  Giovanna verbalized good understanding of education provided.   No spiritual or educational barriers to learning provided    Assessment     Patient tolerated tx well without increased pain. Pt progressing well.   This is a 47 y.o. female referred to " outpatient physical therapy and presents with a medical diagnosis of neck and back pain and demonstrates limitations as described in the problem list. Pt prognosis is Excellent. Pt will continue to benefit from skilled outpatient physical therapy to address the deficits listed in the problem list, provide pt/family education and to maximize pt's level of independence in the home and community environment.     Goals as follows:  Short Term GOALS: 3 weeks. Pt agrees with goals set.  1. Patient demonstrates independence with HEP.   2. Patient demonstrates independence with Postural Awareness.   3. Patient demonstrates independence with body mechanics.      Long Term GOALS: 6 weeks. Pt agrees with goals set.  1. Patient demonstrates increased L/S ROM by 25% or better to improve tolerance to functional activities pain free.   2. Patient demonstrates increased strength BLE's to 4+/5 or greater to improve tolerance to functional activities pain free.   3. Patient demonstrates improved overall function per FOTO Lumbar Survey to % Limitation or less.      Plan     Continue with established Plan of Care towards PT goals.    Therapist: Chemo Lawson, PT  2/27/2018

## 2018-04-30 ENCOUNTER — DOCUMENTATION ONLY (OUTPATIENT)
Dept: REHABILITATION | Facility: HOSPITAL | Age: 48
End: 2018-04-30

## 2018-04-30 NOTE — PROGRESS NOTES
Name: Giovanna Medina  Referring Provider: Diana Gooden Mai, MD  PT Order: PT evaluate and treat  Clinical #: [id number]  Discharge Summary Date: 4/30/2018  Diagnosis: LBP    Patient was seen for 8 OP PT visits from 12/28/17 to 2/27/18. Treatment included: evaluation, HEP, pt education, manual therapy, ther ex, and stretching. PT unable to fully assess goal achievement as pt did not return for follow up sessions/did not reschedule follow up visits. No Gcode reported as discharge is documentation only.This patient is discharged from [facility] PT Services.      Negar Womack, PT, DPT

## 2018-11-13 ENCOUNTER — OFFICE VISIT (OUTPATIENT)
Dept: OBSTETRICS AND GYNECOLOGY | Facility: CLINIC | Age: 48
End: 2018-11-13
Payer: MEDICARE

## 2018-11-13 VITALS
BODY MASS INDEX: 34.84 KG/M2 | HEIGHT: 63 IN | WEIGHT: 196.63 LBS | SYSTOLIC BLOOD PRESSURE: 110 MMHG | DIASTOLIC BLOOD PRESSURE: 70 MMHG

## 2018-11-13 DIAGNOSIS — R22.31 MASS OF RIGHT AXILLA: ICD-10-CM

## 2018-11-13 DIAGNOSIS — Z12.39 BREAST CANCER SCREENING: ICD-10-CM

## 2018-11-13 DIAGNOSIS — N63.20 LEFT BREAST MASS: ICD-10-CM

## 2018-11-13 DIAGNOSIS — N60.02 SOLITARY CYST OF LEFT BREAST: ICD-10-CM

## 2018-11-13 DIAGNOSIS — Z01.419 ENCOUNTER FOR GYNECOLOGICAL EXAMINATION WITHOUT ABNORMAL FINDING: Primary | ICD-10-CM

## 2018-11-13 PROCEDURE — 88175 CYTOPATH C/V AUTO FLUID REDO: CPT

## 2018-11-13 PROCEDURE — 99999 PR PBB SHADOW E&M-EST. PATIENT-LVL III: CPT | Mod: PBBFAC,,, | Performed by: OBSTETRICS & GYNECOLOGY

## 2018-11-13 PROCEDURE — G0101 CA SCREEN;PELVIC/BREAST EXAM: HCPCS | Mod: S$GLB,,, | Performed by: OBSTETRICS & GYNECOLOGY

## 2018-11-13 NOTE — PROGRESS NOTES
"CC: Well woman exam    Giovanna Medina is a 48 y.o. female  presents for a well woman exam.    NO CYCLE for one year.  Some hot flashes.    No vaginal bleeding.   NO pap two years ago.  History of  HSV antibodies per pt  Has bilateral breast masses    Past Medical History:   Diagnosis Date    Glaucoma suspect of both eyes     Optic atrophy, unspecified        Past Surgical History:   Procedure Laterality Date    BRAIN SURGERY  2005     SECTION, CLASSIC      TUBAL LIGATION         OB History    Para Term  AB Living   3 3 3     3   SAB TAB Ectopic Multiple Live Births           3      # Outcome Date GA Lbr Christopher/2nd Weight Sex Delivery Anes PTL Lv   3 Term            2 Term            1 Term                   Family History   Problem Relation Age of Onset    Diabetes Mother     Cataracts Mother     Glaucoma Mother     Stroke Mother     Heart disease Mother     Diabetes Brother     Breast cancer Maternal Grandmother     Blindness Neg Hx     Macular degeneration Neg Hx     Retinal detachment Neg Hx     Colon cancer Neg Hx     Ovarian cancer Neg Hx        Social History     Tobacco Use    Smoking status: Never Smoker    Smokeless tobacco: Never Used   Substance Use Topics    Alcohol use: No    Drug use: No       /70 (BP Location: Left arm, Patient Position: Sitting, BP Method: Large (Manual))   Ht 5' 3" (1.6 m)   Wt 89.2 kg (196 lb 10.4 oz)   BMI 34.84 kg/m²     ROS:  GENERAL: Denies weight gain or weight loss. Feeling well overall.   SKIN: Denies rash or lesions.   HEAD: Denies head injury or headache.   NODES: Denies enlarged lymph nodes.   CHEST: Denies chest pain or shortness of breath.   CARDIOVASCULAR: Denies palpitations or left sided chest pain.   ABDOMEN: No abdominal pain, constipation, diarrhea, nausea, vomiting or rectal bleeding.   URINARY: No frequency, dysuria, hematuria, or burning on urination.  REPRODUCTIVE: See HPI.   BREASTS: The patient " performs breast self-examination and denies pain feels bilateral lumps, NO  nipple discharge.   HEMATOLOGIC: No easy bruisability or excessive bleeding.  MUSCULOSKELETAL: Denies joint pain or swelling.   NEUROLOGIC: Denies syncope or weakness.   PSYCHIATRIC: Denies depression, anxiety or mood swings.    Physical Exam:    APPEARANCE: Well nourished, well developed, in no acute distress.  AFFECT: WNL, alert and oriented x 3  SKIN: No acne or hirsutism  NECK: Neck symmetric without masses or thyromegaly  NODES: No inguinal, cervical, axillary, or femoral lymph node enlargement  CHEST: Good respiratory effect  ABDOMEN: Soft.  No tenderness or masses.  No hepatosplenomegaly.  No hernias.  BREASTS: Symmetrical, right axilla has skin changes and density in the skin. Bilateral breasts have palpable cystic masses, no nipple discharge bilaterally.  PELVIC: Normal external genitalia without lesions.  Normal hair distribution.  Adequate perineal body, normal urethral meatus.  Vagina moist and well rugated without lesions or discharge.  Cervix pink, without lesions, discharge or tenderness.  No significant cystocele or rectocele.  Bimanual exam shows uterus to be normal size, regular, mobile and nontender.  Adnexa without masses or tenderness.    EXTREMITIES: No edema.    ASSESSMENT AND PLAN  1. Encounter for gynecological examination without abnormal finding  Liquid-based pap smear, screening   2. Breast cancer screening  CANCELED: Mammo Digital Screening Bilat w/ Adi   3. Left breast mass  Mammo Digital Diagnostic Bilat with Adi    CANCELED: US Breast Bilateral Complete   4. Mass of right axilla  Mammo Digital Diagnostic Bilat with Adi    CANCELED: US Breast Bilateral Complete   5. Solitary cyst of left breast   Mammo Digital Diagnostic Bilat with Adi       Patient was counseled today on A.C.S. Pap guidelines and recommendations for yearly pelvic exams, mammograms and monthly self breast exams; to see her PCP for other  health maintenance.     Follow-up in about 1 year (around 11/13/2019).

## 2018-11-21 ENCOUNTER — TELEPHONE (OUTPATIENT)
Dept: OBSTETRICS AND GYNECOLOGY | Facility: CLINIC | Age: 48
End: 2018-11-21

## 2018-11-23 ENCOUNTER — HOSPITAL ENCOUNTER (OUTPATIENT)
Dept: RADIOLOGY | Facility: HOSPITAL | Age: 48
Discharge: HOME | End: 2018-11-23
Attending: OBSTETRICS & GYNECOLOGY
Payer: MEDICARE

## 2018-11-23 VITALS — WEIGHT: 196 LBS | HEIGHT: 63 IN | BODY MASS INDEX: 34.73 KG/M2

## 2018-11-23 DIAGNOSIS — N63.20 LEFT BREAST MASS: ICD-10-CM

## 2018-11-23 DIAGNOSIS — N60.02 SOLITARY CYST OF LEFT BREAST: ICD-10-CM

## 2018-11-23 DIAGNOSIS — R22.31 MASS OF RIGHT AXILLA: ICD-10-CM

## 2018-11-23 PROCEDURE — 77062 BREAST TOMOSYNTHESIS BI: CPT | Mod: 26,,, | Performed by: RADIOLOGY

## 2018-11-23 PROCEDURE — 77066 DX MAMMO INCL CAD BI: CPT | Mod: 26,,, | Performed by: RADIOLOGY

## 2018-11-23 PROCEDURE — 76642 ULTRASOUND BREAST LIMITED: CPT | Mod: TC,50,PO

## 2018-11-23 PROCEDURE — 76642 ULTRASOUND BREAST LIMITED: CPT | Mod: 26,50,, | Performed by: RADIOLOGY

## 2018-11-23 PROCEDURE — 77062 BREAST TOMOSYNTHESIS BI: CPT | Mod: TC,PO

## 2018-11-27 ENCOUNTER — TELEPHONE (OUTPATIENT)
Dept: OBSTETRICS AND GYNECOLOGY | Facility: CLINIC | Age: 48
End: 2018-11-27

## 2018-11-27 NOTE — TELEPHONE ENCOUNTER
----- Message from Mar Martinez MD sent at 11/25/2018  8:08 PM CST -----  The breast findings are benign.  Follow up next year with a mammogram

## 2018-12-05 ENCOUNTER — TELEPHONE (OUTPATIENT)
Dept: OBSTETRICS AND GYNECOLOGY | Facility: CLINIC | Age: 48
End: 2018-12-05

## 2018-12-05 NOTE — TELEPHONE ENCOUNTER
Called patient. No answer. Left voice message for patient to call the office.       Spoke with supervisor regarding coding. The code used for the mammogram cannot be changed. The diagnostic mammogram was done due to masses in the left breast and right axilla. Protocol was followed and a diagnostic mammogram was in fact indicated.

## 2018-12-05 NOTE — TELEPHONE ENCOUNTER
Patient stated that she would like the diagnostic mammogram that was done on 11/23/18 to be changed to a screening mammogram. Patient advised that it was done as a diagnostic because she notified Dr. Martinez of bilateral masses on the breast.  Patient stated that her insurance is needing the code to be changed to that of screenings. Advised patient that the diagnostic and screening mammograms are two different test and patient insist that she did not request the diagnostic.

## 2018-12-05 NOTE — TELEPHONE ENCOUNTER
----- Message from Marcial Mina sent at 12/5/2018  2:06 PM CST -----  Contact: BRAYAN LORA [[id number]]    Name of Who is Calling: BRAYAN LORA [[id number]]      What is the request in detail: Patient had mammogram on 11/23. Would like to speak with staff in regards to the wording on mammogram orders; insurance is being difficult. States that it needs annual mammogram screening and not diagnostic. Please advise      Can the clinic reply by [facility]: no      What Number to Call Back if not in [facility]: [phone]

## 2018-12-06 ENCOUNTER — TELEPHONE (OUTPATIENT)
Dept: OBSTETRICS AND GYNECOLOGY | Facility: CLINIC | Age: 48
End: 2018-12-06

## 2018-12-06 NOTE — TELEPHONE ENCOUNTER
"Contacted pt in regards to diagnostic mmg.  Patient was informed that since there was a lump in her breast we did a Dx mmg. Pt stated that her insurance is not covering the test and that she is needing it changed to screening mmg. Pt informed that we cannot change the code due to the issue that she had. Patient stated that "Dr Martinez did not feel the lump in her breast and that she had to point it out to her". Patient was informed that due to her concern Dr Martinez ordered the Dx mmg". Patient stated that she will contact her insurance company to see what she needs to do but she never asked for the Dx mmg. Pt also informed that if her screening mmg came back abnormal that a DX mmg/US would have been ordered to follow up and make sure that the abnormalities were not a concern. Patient hung up  "

## 2018-12-06 NOTE — TELEPHONE ENCOUNTER
----- Message from Mona Murphy MA sent at 12/5/2018  4:56 PM CST -----  Contact: Patient  Patient would also like to know if she should still have screening mammogram done.   ----- Message -----  From: Mona Murphy MA  Sent: 12/5/2018   4:51 PM  To: Juan DIAZ Staff    Patient may call back for an explanation regarding her mammogram being coded as a diagnostic. She cannot have it changed to a screening.    Spoke with supervisor regarding coding. The code used for the mammogram cannot be changed. The diagnostic mammogram was done due to masses in the left breast and right axilla. Protocol was followed and a diagnostic mammogram was in fact indicated.

## 2020-03-19 ENCOUNTER — NURSE TRIAGE (OUTPATIENT)
Dept: ADMINISTRATIVE | Facility: CLINIC | Age: 50
End: 2020-03-19

## 2020-03-19 NOTE — TELEPHONE ENCOUNTER
Reason for Disposition   Information only question and nurse able to answer    Protocols used: NO PROTOCOL AVAILABLE - INFORMATION ONLY-A-[place]

## 2020-03-19 NOTE — TELEPHONE ENCOUNTER
Spoke to patient for Triage call, patient states of following symptoms: fever, cough, headache, body aches, chills, headache. Per Triage protocol, recommended patient for [facility], patient requested to be guided through process, assisted patient. During call, patient's primary care provider called and advised patient to come to her office. Patient plans to go to PCP office in the AM. Advised to call diana OOC# for worsening symptoms. Patient verbalized understanding.

## 2020-03-28 ENCOUNTER — NURSE TRIAGE (OUTPATIENT)
Dept: ADMINISTRATIVE | Facility: CLINIC | Age: 50
End: 2020-03-28

## 2020-03-28 ENCOUNTER — HOSPITAL ENCOUNTER (EMERGENCY)
Facility: HOSPITAL | Age: 50
Discharge: HOME | End: 2020-03-28
Attending: EMERGENCY MEDICINE
Payer: MEDICARE

## 2020-03-28 VITALS
OXYGEN SATURATION: 98 % | TEMPERATURE: 97 F | WEIGHT: 192 LBS | DIASTOLIC BLOOD PRESSURE: 65 MMHG | HEART RATE: 101 BPM | SYSTOLIC BLOOD PRESSURE: 133 MMHG | BODY MASS INDEX: 34.01 KG/M2 | RESPIRATION RATE: 16 BRPM

## 2020-03-28 DIAGNOSIS — R05.9 COUGH: ICD-10-CM

## 2020-03-28 DIAGNOSIS — R50.9 FEVER, UNSPECIFIED FEVER CAUSE: ICD-10-CM

## 2020-03-28 DIAGNOSIS — Z20.822 SUSPECTED COVID-19 VIRUS INFECTION: Primary | ICD-10-CM

## 2020-03-28 PROCEDURE — 99285 EMERGENCY DEPT VISIT HI MDM: CPT | Mod: ,,, | Performed by: EMERGENCY MEDICINE

## 2020-03-28 PROCEDURE — 63600175 PHARM REV CODE 636 W HCPCS: Performed by: EMERGENCY MEDICINE

## 2020-03-28 PROCEDURE — U0002 COVID-19 LAB TEST NON-CDC: HCPCS

## 2020-03-28 PROCEDURE — 99285 PR EMERGENCY DEPT VISIT,LEVEL V: ICD-10-PCS | Mod: ,,, | Performed by: EMERGENCY MEDICINE

## 2020-03-28 PROCEDURE — 96360 HYDRATION IV INFUSION INIT: CPT

## 2020-03-28 PROCEDURE — 99283 EMERGENCY DEPT VISIT LOW MDM: CPT | Mod: 25

## 2020-03-28 RX ORDER — ONDANSETRON 4 MG/1
4 TABLET, ORALLY DISINTEGRATING ORAL EVERY 8 HOURS PRN
Qty: 30 TABLET | Refills: 0 | Status: SHIPPED | OUTPATIENT
Start: 2020-03-28 | End: 2021-06-05 | Stop reason: SDUPTHER

## 2020-03-28 RX ORDER — ONDANSETRON 4 MG/1
4 TABLET, ORALLY DISINTEGRATING ORAL EVERY 8 HOURS PRN
Qty: 12 TABLET | Refills: 0 | Status: SHIPPED | OUTPATIENT
Start: 2020-03-28 | End: 2020-03-28 | Stop reason: SDUPTHER

## 2020-03-28 RX ADMIN — SODIUM CHLORIDE 1000 ML: 0.9 INJECTION, SOLUTION INTRAVENOUS at 10:03

## 2020-03-29 NOTE — ED PROVIDER NOTES
Encounter Date: 3/28/2020    COVID Statement  The  of Health and Human Services and Francisco Javier Sanchez, Governor of the [place] [facility], have declared a State of Public Health Emergency due to the spread of a novel coronavirus and disease (COVID-19).  There is no currently accepted treatment except conservative measures and respiratory support if appropriate.  This has lead to significant resource capacity and potential delays in care.         History     Chief Complaint   Patient presents with    Fever     Pt reports fever, productive cough and nausea x 1 week. She reports her sister tested positive for Covid. No antipyretics today.      49y F without significant PMH presents with fever, cough and nausea for 1 week. She says her fever ranges from 99- 102.7. She has not taken any fever medication today. Cough productive of thick phlegm. Reports she was exposed to her sister who was Covid +. She says she was tested on Monday for influenza at urgent care, this was negative. She was not tested for Covid at that time. She reports severe fatigue and poor oral intake. She denies significant shortness of breath.     The history is provided by the patient.     Review of patient's allergies indicates:   Allergen Reactions    Codeine      Past Medical History:   Diagnosis Date    Glaucoma suspect of both eyes     Optic atrophy, unspecified      Past Surgical History:   Procedure Laterality Date    BRAIN SURGERY  2005     SECTION, CLASSIC      TUBAL LIGATION       Family History   Problem Relation Age of Onset    Diabetes Mother     Cataracts Mother     Glaucoma Mother     Stroke Mother     Heart disease Mother     Diabetes Brother     Breast cancer Maternal Grandmother     Blindness Neg Hx     Macular degeneration Neg Hx     Retinal detachment Neg Hx     Colon cancer Neg Hx     Ovarian cancer Neg Hx      Social History     Tobacco Use    Smoking status: Never Smoker    Smokeless  tobacco: Never Used   Substance Use Topics    Alcohol use: No    Drug use: No     Review of Systems   Constitutional: Positive for activity change, appetite change, chills, fatigue and fever.   HENT: Negative for sore throat.    Respiratory: Positive for cough. Negative for shortness of breath.    Cardiovascular: Negative for chest pain.   Gastrointestinal: Positive for nausea. Negative for diarrhea.   Genitourinary: Negative for dysuria.   Musculoskeletal: Negative for back pain.   Skin: Negative for rash.   Neurological: Positive for headaches.   Hematological: Does not bruise/bleed easily.   All other systems reviewed and are negative.      Physical Exam     Initial Vitals [03/28/20 2107]   BP Pulse Resp Temp SpO2   132/79 (!) 123 18 99.4 °F (37.4 °C) 95 %      MAP       --         Physical Exam    Nursing note and vitals reviewed.  Constitutional: Vital signs are normal. She appears well-developed and well-nourished.   HENT:   Head: Normocephalic and atraumatic.   Eyes: Conjunctivae are normal.   Neck: Trachea normal and normal range of motion. Neck supple.   Cardiovascular: Regular rhythm. Tachycardia present.    Pulmonary/Chest: No tachypnea. No respiratory distress.   Abdominal: Soft. Normal appearance. There is no tenderness.   Musculoskeletal: Normal range of motion.   Neurological: She is alert and oriented to person, place, and time.   Skin: Skin is warm and dry.   Psychiatric:   +tearful         ED Course   Procedures  Labs Reviewed   SARS-COV-2 (COVID-19) QUALITATIVE PCR          Imaging Results          X-Ray Chest 1 View (Final result)  Result time 03/28/20 22:18:57    Final result by Bob Santiago MD (03/28/20 22:18:57)                 Impression:      No acute abnormality.      Electronically signed by: Bob Santiago  Date:    03/28/2020  Time:    22:18             Narrative:    EXAMINATION:  XR CHEST 1 VIEW    CLINICAL HISTORY:  Cough    TECHNIQUE:  Single frontal view of the chest was  performed.    COMPARISON:  11/11/2014    FINDINGS:  The lungs are clear, with normal appearance of pulmonary vasculature and no pleural effusion or pneumothorax.    The cardiac silhouette is normal in size. The hilar and mediastinal contours are unremarkable.    Bones are intact.                              X-Rays:   Independently Interpreted Readings:   Chest X-Ray: Normal heart size.  No infiltrates.  No acute abnormalities.     Medical Decision Making:   History:   Old Medical Records: I decided to obtain old medical records.  Initial Assessment:   Emergent evaluation of febrile illness  Differential Diagnosis:   Covid   Pneumonia   Dehydration   Independently Interpreted Test(s):   I have ordered and independently interpreted X-rays - see prior notes.  Clinical Tests:   Lab Tests: Ordered  ED Management:  All current protocols and procedures are being followed and updated daily to minimize spread of the infection. My personal examination limited due to patient's stability to date, risk of virus transmission, and critical PPE shortage.    Patient presenting with known covid contact and symptoms consistent with illness. Not significantly hypoxic. Chest xray without I  infiltrate. Tachycardia noted. IV fluids given. There is no indication for admission at this time.   Advised symptomatic treatment at home and discussed reasons to return to the Ed.                                    Clinical Impression:       ICD-10-CM ICD-9-CM   1. Suspected Covid-19 Virus Infection R68.89    2. Cough R05 786.2   3. Fever, unspecified fever cause R50.9 780.60         Disposition:   Disposition: Discharged  Condition: Stable                        Robert Duran MD  03/28/20 [id number]

## 2020-03-29 NOTE — DISCHARGE INSTRUCTIONS
You will be contacted with test results.  Take tylenol for fever. Use zofran for nausea. Increase oral intake- Gatorade or pedialyte are good choices     IF YOU FEEL WORSENED SHORTNESS OF BREATH, YOU SHOULD RETURN TO THE EMERGENCY DEPARTMENT   YOU CAN CALL THE [facility] ON CALL NURSE LINE 24/7 TO ASK QUESTIONS ABOUT YOUR SYMPTOMS  [phone]    YOU SHOULD CONTINUE QUARANTINE PRECAUTIONS

## 2020-03-29 NOTE — ED TRIAGE NOTES
Giovanna Medina, a 49 y.o. female presents to the ED     Triage note:  Chief Complaint   Patient presents with    Fever     Pt reports fever, productive cough and nausea x 1 week. She reports her sister tested positive for Covid. No antipyretics today.      Review of patient's allergies indicates:   Allergen Reactions    Codeine      Past Medical History:   Diagnosis Date    Glaucoma suspect of both eyes     Optic atrophy, unspecified          LOC: The patient is awake, alert, aware of environment with an appropriate affect. Oriented x3, speaking appropriately  APPEARANCE: Pt resting comfortably, in no acute distress, pt is clean and well groomed, clothing properly fastened  SKIN: Skin warm, dry and intact, normal skin turgor, moist mucus membranes  RESPIRATORY: Airway is open and patent, respirations are spontaneous, even and unlabored, normal effort and rate +productive cough  CARDIAC: Normal rate and rhythm, no peripheral edema noted, bilateral radial pulses 2+  ABDOMEN: Soft, nontender, nondistended. +nausea  NEUROLOGIC:  facial expression is symmetrical, patient moving all extremities spontaneously, normal sensation in all extremities when touched with a finger.  Follows all commands appropriately  MUSCULOSKELETAL: No obvious deformities.

## 2020-03-29 NOTE — TELEPHONE ENCOUNTER
50 yo woman with 1 week history of fever and nonproductive cough. Fever was as high as 101.7, not taking any medication. Cough has not improved, but denies any SOB. Has known COVID exposure from sister during the week. Offered anywhere care referral, and educated as to various drive up testing options, per patient request. Counseled patient on maintaining proper infectious precautions and to seek care more urgently if symptoms worsened.    Reason for Disposition   [1] Fever or feeling feverish AND [2] within 14 Days of COVID-19 EXPOSURE (Close Contact)    Additional Information   Negative: Severe difficulty breathing (e.g., struggling for each breath, speak in single words, bluish lips)   Negative: Sounds like a life-threatening emergency to the triager   Negative: [1] Difficulty breathing (shortness of breath) occurs AND [2] onset > 14 days after COVID-19 EXPOSURE (Close Contact)   Negative: [1] Dry cough occurs AND [2] onset > 14 days after COVID-19 EXPOSURE   Negative: [1] Wet cough (i.e., white-yellow, yellow, green, or azar colored sputum) AND [2] onset > 14 days after COVID-19 EXPOSURE   Negative: [1] Common cold symptoms AND [2] onset > 14 days after COVID-19 EXPOSURE   Negative: [1] Difficulty breathing occurs AND [2] within 14 days of COVID-19 EXPOSURE (Close Contact)   Negative: Patient sounds very sick or weak to the triager    Protocols used: CORONAVIRUS (COVID-19) EXPOSURE-A-

## 2020-03-29 NOTE — ED NOTES
Patient was ambulated per physician orders prior to being discharged, O2 was 95 percent, and pluse was 135 at 22:13 p.m.

## 2020-03-30 LAB — SARS-COV-2 RNA RESP QL NAA+PROBE: DETECTED

## 2021-02-05 ENCOUNTER — CLINICAL SUPPORT (OUTPATIENT)
Dept: URGENT CARE | Facility: CLINIC | Age: 51
End: 2021-02-05
Payer: MEDICARE

## 2021-02-05 DIAGNOSIS — Z11.59 SCREENING FOR VIRAL DISEASE: Primary | ICD-10-CM

## 2021-02-05 LAB
CTP QC/QA: YES
SARS-COV-2 RDRP RESP QL NAA+PROBE: NEGATIVE

## 2021-02-05 PROCEDURE — U0002 COVID-19 LAB TEST NON-CDC: HCPCS | Mod: QW,S$GLB,, | Performed by: PHYSICIAN ASSISTANT

## 2021-02-05 PROCEDURE — U0002: ICD-10-PCS | Mod: QW,S$GLB,, | Performed by: PHYSICIAN ASSISTANT

## 2021-05-04 ENCOUNTER — PATIENT MESSAGE (OUTPATIENT)
Dept: RESEARCH | Facility: HOSPITAL | Age: 51
End: 2021-05-04

## 2021-06-05 ENCOUNTER — OFFICE VISIT (OUTPATIENT)
Dept: URGENT CARE | Facility: CLINIC | Age: 51
End: 2021-06-05
Payer: MEDICARE

## 2021-06-05 ENCOUNTER — HOSPITAL ENCOUNTER (EMERGENCY)
Facility: HOSPITAL | Age: 51
Discharge: HOME | End: 2021-06-05
Attending: EMERGENCY MEDICINE
Payer: MEDICARE

## 2021-06-05 VITALS
HEART RATE: 79 BPM | OXYGEN SATURATION: 97 % | BODY MASS INDEX: 34.2 KG/M2 | RESPIRATION RATE: 18 BRPM | HEIGHT: 63 IN | TEMPERATURE: 99 F | WEIGHT: 193 LBS | DIASTOLIC BLOOD PRESSURE: 83 MMHG | SYSTOLIC BLOOD PRESSURE: 131 MMHG

## 2021-06-05 VITALS
RESPIRATION RATE: 18 BRPM | SYSTOLIC BLOOD PRESSURE: 136 MMHG | OXYGEN SATURATION: 98 % | BODY MASS INDEX: 34.19 KG/M2 | DIASTOLIC BLOOD PRESSURE: 77 MMHG | HEART RATE: 85 BPM | WEIGHT: 193 LBS | TEMPERATURE: 99 F

## 2021-06-05 DIAGNOSIS — R42 DIZZINESS: Primary | ICD-10-CM

## 2021-06-05 DIAGNOSIS — R42 DIZZINESS: ICD-10-CM

## 2021-06-05 DIAGNOSIS — N30.00 ACUTE CYSTITIS WITHOUT HEMATURIA: ICD-10-CM

## 2021-06-05 DIAGNOSIS — R42 VERTIGO: Primary | ICD-10-CM

## 2021-06-05 LAB
ALBUMIN SERPL BCP-MCNC: 3.7 G/DL (ref 3.5–5.2)
ALP SERPL-CCNC: 77 U/L (ref 55–135)
ALT SERPL W/O P-5'-P-CCNC: 19 U/L (ref 10–44)
ANION GAP SERPL CALC-SCNC: 10 MMOL/L (ref 8–16)
AST SERPL-CCNC: 15 U/L (ref 10–40)
BACTERIA #/AREA URNS HPF: NORMAL /HPF
BASOPHILS # BLD AUTO: 0.05 K/UL (ref 0–0.2)
BASOPHILS NFR BLD: 0.6 % (ref 0–1.9)
BILIRUB SERPL-MCNC: 0.2 MG/DL (ref 0.1–1)
BILIRUB UR QL STRIP: NEGATIVE
BUN SERPL-MCNC: 7 MG/DL (ref 6–20)
CALCIUM SERPL-MCNC: 9.4 MG/DL (ref 8.7–10.5)
CHLORIDE SERPL-SCNC: 106 MMOL/L (ref 95–110)
CLARITY UR: ABNORMAL
CO2 SERPL-SCNC: 26 MMOL/L (ref 23–29)
COLOR UR: YELLOW
CREAT SERPL-MCNC: 0.8 MG/DL (ref 0.5–1.4)
DIFFERENTIAL METHOD: ABNORMAL
EOSINOPHIL # BLD AUTO: 0.1 K/UL (ref 0–0.5)
EOSINOPHIL NFR BLD: 1.7 % (ref 0–8)
ERYTHROCYTE [DISTWIDTH] IN BLOOD BY AUTOMATED COUNT: 14.1 % (ref 11.5–14.5)
EST. GFR  (AFRICAN AMERICAN): >60 ML/MIN/1.73 M^2
EST. GFR  (NON AFRICAN AMERICAN): >60 ML/MIN/1.73 M^2
GLUCOSE SERPL-MCNC: 115 MG/DL (ref 70–110)
GLUCOSE SERPL-MCNC: 141 MG/DL (ref 70–110)
GLUCOSE UR QL STRIP: NEGATIVE
HCT VFR BLD AUTO: 41.1 % (ref 37–48.5)
HGB BLD-MCNC: 13.1 G/DL (ref 12–16)
HGB UR QL STRIP: NEGATIVE
IMM GRANULOCYTES # BLD AUTO: 0.02 K/UL (ref 0–0.04)
IMM GRANULOCYTES NFR BLD AUTO: 0.2 % (ref 0–0.5)
KETONES UR QL STRIP: NEGATIVE
LEUKOCYTE ESTERASE UR QL STRIP: ABNORMAL
LYMPHOCYTES # BLD AUTO: 2.2 K/UL (ref 1–4.8)
LYMPHOCYTES NFR BLD: 27.8 % (ref 18–48)
MCH RBC QN AUTO: 26.5 PG (ref 27–31)
MCHC RBC AUTO-ENTMCNC: 31.9 G/DL (ref 32–36)
MCV RBC AUTO: 83 FL (ref 82–98)
MICROSCOPIC COMMENT: NORMAL
MONOCYTES # BLD AUTO: 0.5 K/UL (ref 0.3–1)
MONOCYTES NFR BLD: 5.7 % (ref 4–15)
NEUTROPHILS # BLD AUTO: 5.1 K/UL (ref 1.8–7.7)
NEUTROPHILS NFR BLD: 64 % (ref 38–73)
NITRITE UR QL STRIP: NEGATIVE
NRBC BLD-RTO: 0 /100 WBC
PH UR STRIP: 6 [PH] (ref 5–8)
PLATELET # BLD AUTO: 430 K/UL (ref 150–450)
PMV BLD AUTO: 8.5 FL (ref 9.2–12.9)
POTASSIUM SERPL-SCNC: 4.2 MMOL/L (ref 3.5–5.1)
PROT SERPL-MCNC: 8.2 G/DL (ref 6–8.4)
PROT UR QL STRIP: NEGATIVE
RBC # BLD AUTO: 4.94 M/UL (ref 4–5.4)
SODIUM SERPL-SCNC: 142 MMOL/L (ref 136–145)
SP GR UR STRIP: 1.01 (ref 1–1.03)
SQUAMOUS #/AREA URNS HPF: 9 /HPF
TROPONIN I SERPL DL<=0.01 NG/ML-MCNC: <0.006 NG/ML (ref 0–0.03)
URN SPEC COLLECT METH UR: ABNORMAL
UROBILINOGEN UR STRIP-ACNC: NEGATIVE EU/DL
WBC # BLD AUTO: 8.02 K/UL (ref 3.9–12.7)
WBC #/AREA URNS HPF: 2 /HPF (ref 0–5)

## 2021-06-05 PROCEDURE — 99285 EMERGENCY DEPT VISIT HI MDM: CPT | Mod: 25

## 2021-06-05 PROCEDURE — 85025 COMPLETE CBC W/AUTO DIFF WBC: CPT | Performed by: PHYSICIAN ASSISTANT

## 2021-06-05 PROCEDURE — 3008F PR BODY MASS INDEX (BMI) DOCUMENTED: ICD-10-PCS | Mod: CPTII,S$GLB,, | Performed by: NURSE PRACTITIONER

## 2021-06-05 PROCEDURE — 99203 PR OFFICE/OUTPT VISIT, NEW, LEVL III, 30-44 MIN: ICD-10-PCS | Mod: S$GLB,,, | Performed by: NURSE PRACTITIONER

## 2021-06-05 PROCEDURE — 82962 POCT GLUCOSE, HAND-HELD DEVICE: ICD-10-PCS | Mod: S$GLB,,, | Performed by: NURSE PRACTITIONER

## 2021-06-05 PROCEDURE — 80053 COMPREHEN METABOLIC PANEL: CPT | Performed by: PHYSICIAN ASSISTANT

## 2021-06-05 PROCEDURE — 63600175 PHARM REV CODE 636 W HCPCS: Performed by: EMERGENCY MEDICINE

## 2021-06-05 PROCEDURE — 96375 TX/PRO/DX INJ NEW DRUG ADDON: CPT

## 2021-06-05 PROCEDURE — 99203 OFFICE O/P NEW LOW 30 MIN: CPT | Mod: S$GLB,,, | Performed by: NURSE PRACTITIONER

## 2021-06-05 PROCEDURE — 93010 EKG 12-LEAD: ICD-10-PCS | Mod: ,,, | Performed by: INTERNAL MEDICINE

## 2021-06-05 PROCEDURE — 81000 URINALYSIS NONAUTO W/SCOPE: CPT | Performed by: PHYSICIAN ASSISTANT

## 2021-06-05 PROCEDURE — 93005 ELECTROCARDIOGRAM TRACING: CPT

## 2021-06-05 PROCEDURE — 82962 GLUCOSE BLOOD TEST: CPT | Mod: S$GLB,,, | Performed by: NURSE PRACTITIONER

## 2021-06-05 PROCEDURE — 84484 ASSAY OF TROPONIN QUANT: CPT | Performed by: PHYSICIAN ASSISTANT

## 2021-06-05 PROCEDURE — 3008F BODY MASS INDEX DOCD: CPT | Mod: CPTII,S$GLB,, | Performed by: NURSE PRACTITIONER

## 2021-06-05 PROCEDURE — 96374 THER/PROPH/DIAG INJ IV PUSH: CPT

## 2021-06-05 PROCEDURE — 93010 ELECTROCARDIOGRAM REPORT: CPT | Mod: ,,, | Performed by: INTERNAL MEDICINE

## 2021-06-05 PROCEDURE — 25000003 PHARM REV CODE 250: Performed by: EMERGENCY MEDICINE

## 2021-06-05 RX ORDER — CEPHALEXIN 500 MG/1
500 CAPSULE ORAL EVERY 8 HOURS
Qty: 15 CAPSULE | Refills: 0 | Status: SHIPPED | OUTPATIENT
Start: 2021-06-05 | End: 2021-06-10

## 2021-06-05 RX ORDER — ONDANSETRON 4 MG/1
4 TABLET, ORALLY DISINTEGRATING ORAL EVERY 8 HOURS PRN
Qty: 20 TABLET | Refills: 0 | Status: SHIPPED | OUTPATIENT
Start: 2021-06-05 | End: 2021-06-30

## 2021-06-05 RX ORDER — MECLIZINE HYDROCHLORIDE 25 MG/1
25 TABLET ORAL 3 TIMES DAILY PRN
Qty: 20 TABLET | Refills: 0 | Status: SHIPPED | OUTPATIENT
Start: 2021-06-05 | End: 2021-06-30

## 2021-06-05 RX ORDER — ONDANSETRON 2 MG/ML
4 INJECTION INTRAMUSCULAR; INTRAVENOUS
Status: COMPLETED | OUTPATIENT
Start: 2021-06-05 | End: 2021-06-05

## 2021-06-05 RX ORDER — MECLIZINE HYDROCHLORIDE 25 MG/1
25 TABLET ORAL
Status: COMPLETED | OUTPATIENT
Start: 2021-06-05 | End: 2021-06-05

## 2021-06-05 RX ORDER — DIAZEPAM 10 MG/2ML
5 INJECTION INTRAMUSCULAR
Status: COMPLETED | OUTPATIENT
Start: 2021-06-05 | End: 2021-06-05

## 2021-06-05 RX ORDER — DIAZEPAM 5 MG/1
5 TABLET ORAL EVERY 6 HOURS PRN
Qty: 10 TABLET | Refills: 0 | Status: SHIPPED | OUTPATIENT
Start: 2021-06-05 | End: 2021-06-30

## 2021-06-05 RX ORDER — NAPROXEN SODIUM 220 MG/1
81 TABLET, FILM COATED ORAL DAILY
COMMUNITY
End: 2022-01-11

## 2021-06-05 RX ORDER — BRIMONIDINE TARTRATE, TIMOLOL MALEATE 2; 5 MG/ML; MG/ML
SOLUTION/ DROPS OPHTHALMIC
COMMUNITY
Start: 2021-02-19 | End: 2022-08-23 | Stop reason: SDUPTHER

## 2021-06-05 RX ADMIN — DIAZEPAM 5 MG: 10 INJECTION, SOLUTION INTRAMUSCULAR; INTRAVENOUS at 06:06

## 2021-06-05 RX ADMIN — ONDANSETRON 4 MG: 2 INJECTION INTRAMUSCULAR; INTRAVENOUS at 06:06

## 2021-06-05 RX ADMIN — MECLIZINE HYDROCHLORIDE 25 MG: 25 TABLET ORAL at 06:06

## 2021-06-23 ENCOUNTER — OFFICE VISIT (OUTPATIENT)
Dept: URGENT CARE | Facility: CLINIC | Age: 51
End: 2021-06-23
Payer: MEDICARE

## 2021-06-23 VITALS
HEART RATE: 103 BPM | OXYGEN SATURATION: 98 % | SYSTOLIC BLOOD PRESSURE: 119 MMHG | DIASTOLIC BLOOD PRESSURE: 82 MMHG | RESPIRATION RATE: 20 BRPM | TEMPERATURE: 99 F

## 2021-06-23 DIAGNOSIS — M25.512 ACUTE PAIN OF LEFT SHOULDER: ICD-10-CM

## 2021-06-23 DIAGNOSIS — S46.002A INJURY OF LEFT ROTATOR CUFF, INITIAL ENCOUNTER: Primary | ICD-10-CM

## 2021-06-23 PROCEDURE — 73030 X-RAY EXAM OF SHOULDER: CPT | Mod: FY,LT,S$GLB, | Performed by: RADIOLOGY

## 2021-06-23 PROCEDURE — 96372 PR INJECTION,THERAP/PROPH/DIAG2ST, IM OR SUBCUT: ICD-10-PCS | Mod: S$GLB,,, | Performed by: PHYSICIAN ASSISTANT

## 2021-06-23 PROCEDURE — 99213 PR OFFICE/OUTPT VISIT, EST, LEVL III, 20-29 MIN: ICD-10-PCS | Mod: 25,S$GLB,, | Performed by: PHYSICIAN ASSISTANT

## 2021-06-23 PROCEDURE — 73030 XR SHOULDER TRAUMA 3 VIEW LEFT: ICD-10-PCS | Mod: FY,LT,S$GLB, | Performed by: RADIOLOGY

## 2021-06-23 PROCEDURE — 96372 THER/PROPH/DIAG INJ SC/IM: CPT | Mod: S$GLB,,, | Performed by: PHYSICIAN ASSISTANT

## 2021-06-23 PROCEDURE — 99213 OFFICE O/P EST LOW 20 MIN: CPT | Mod: 25,S$GLB,, | Performed by: PHYSICIAN ASSISTANT

## 2021-06-23 RX ORDER — METHOCARBAMOL 500 MG/1
500 TABLET, FILM COATED ORAL 3 TIMES DAILY PRN
Qty: 21 TABLET | Refills: 0 | Status: SHIPPED | OUTPATIENT
Start: 2021-06-23 | End: 2021-06-30

## 2021-06-23 RX ORDER — KETOROLAC TROMETHAMINE 30 MG/ML
30 INJECTION, SOLUTION INTRAMUSCULAR; INTRAVENOUS
Status: COMPLETED | OUTPATIENT
Start: 2021-06-23 | End: 2021-06-23

## 2021-06-23 RX ADMIN — KETOROLAC TROMETHAMINE 30 MG: 30 INJECTION, SOLUTION INTRAMUSCULAR; INTRAVENOUS at 07:06

## 2021-06-30 ENCOUNTER — OFFICE VISIT (OUTPATIENT)
Dept: ORTHOPEDICS | Facility: CLINIC | Age: 51
End: 2021-06-30
Payer: MEDICARE

## 2021-06-30 VITALS
HEART RATE: 96 BPM | SYSTOLIC BLOOD PRESSURE: 143 MMHG | BODY MASS INDEX: 32.25 KG/M2 | WEIGHT: 193.56 LBS | DIASTOLIC BLOOD PRESSURE: 94 MMHG | HEIGHT: 65 IN

## 2021-06-30 DIAGNOSIS — S46.012A ROTATOR CUFF STRAIN, LEFT, INITIAL ENCOUNTER: Primary | ICD-10-CM

## 2021-06-30 DIAGNOSIS — M25.512 ACUTE PAIN OF LEFT SHOULDER: ICD-10-CM

## 2021-06-30 PROCEDURE — 99999 PR PBB SHADOW E&M-EST. PATIENT-LVL III: CPT | Mod: PBBFAC,,, | Performed by: PHYSICIAN ASSISTANT

## 2021-06-30 PROCEDURE — 3008F PR BODY MASS INDEX (BMI) DOCUMENTED: ICD-10-PCS | Mod: CPTII,S$GLB,, | Performed by: PHYSICIAN ASSISTANT

## 2021-06-30 PROCEDURE — 99999 PR PBB SHADOW E&M-EST. PATIENT-LVL III: ICD-10-PCS | Mod: PBBFAC,,, | Performed by: PHYSICIAN ASSISTANT

## 2021-06-30 PROCEDURE — 1125F AMNT PAIN NOTED PAIN PRSNT: CPT | Mod: S$GLB,,, | Performed by: PHYSICIAN ASSISTANT

## 2021-06-30 PROCEDURE — 3008F BODY MASS INDEX DOCD: CPT | Mod: CPTII,S$GLB,, | Performed by: PHYSICIAN ASSISTANT

## 2021-06-30 PROCEDURE — 99213 PR OFFICE/OUTPT VISIT, EST, LEVL III, 20-29 MIN: ICD-10-PCS | Mod: S$GLB,,, | Performed by: PHYSICIAN ASSISTANT

## 2021-06-30 PROCEDURE — 1125F PR PAIN SEVERITY QUANTIFIED, PAIN PRESENT: ICD-10-PCS | Mod: S$GLB,,, | Performed by: PHYSICIAN ASSISTANT

## 2021-06-30 PROCEDURE — 99213 OFFICE O/P EST LOW 20 MIN: CPT | Mod: S$GLB,,, | Performed by: PHYSICIAN ASSISTANT

## 2021-06-30 RX ORDER — NAPROXEN 500 MG/1
500 TABLET ORAL 2 TIMES DAILY
Qty: 60 TABLET | Refills: 3 | Status: SHIPPED | OUTPATIENT
Start: 2021-06-30 | End: 2021-12-10

## 2021-06-30 RX ORDER — NAPROXEN 500 MG/1
TABLET ORAL
COMMUNITY
Start: 2021-04-12 | End: 2021-12-10

## 2021-07-26 ENCOUNTER — CLINICAL SUPPORT (OUTPATIENT)
Dept: URGENT CARE | Facility: CLINIC | Age: 51
End: 2021-07-26
Payer: MEDICARE

## 2021-07-26 DIAGNOSIS — Z78.9 NO KNOWN HEALTH PROBLEMS: Primary | ICD-10-CM

## 2021-07-26 LAB
CTP QC/QA: YES
SARS-COV-2 RDRP RESP QL NAA+PROBE: NEGATIVE

## 2021-07-26 PROCEDURE — 87635 SARS-COV-2 COVID-19 AMP PRB: CPT | Mod: QW,S$GLB,, | Performed by: PHYSICIAN ASSISTANT

## 2021-07-26 PROCEDURE — 87635: ICD-10-PCS | Mod: QW,S$GLB,, | Performed by: PHYSICIAN ASSISTANT

## 2021-12-02 ENCOUNTER — OFFICE VISIT (OUTPATIENT)
Dept: URGENT CARE | Facility: CLINIC | Age: 51
End: 2021-12-02
Payer: COMMERCIAL

## 2021-12-02 VITALS
HEART RATE: 123 BPM | RESPIRATION RATE: 18 BRPM | OXYGEN SATURATION: 96 % | SYSTOLIC BLOOD PRESSURE: 136 MMHG | BODY MASS INDEX: 34.02 KG/M2 | WEIGHT: 192 LBS | DIASTOLIC BLOOD PRESSURE: 90 MMHG | TEMPERATURE: 100 F | HEIGHT: 63 IN

## 2021-12-02 DIAGNOSIS — R50.9 FEVER, UNSPECIFIED FEVER CAUSE: ICD-10-CM

## 2021-12-02 DIAGNOSIS — R07.9 CHEST PAIN, UNSPECIFIED TYPE: Primary | ICD-10-CM

## 2021-12-02 LAB
CTP QC/QA: YES
CTP QC/QA: YES
POC MOLECULAR INFLUENZA A AGN: NEGATIVE
POC MOLECULAR INFLUENZA B AGN: NEGATIVE
SARS-COV-2 RDRP RESP QL NAA+PROBE: NEGATIVE

## 2021-12-02 PROCEDURE — U0002: ICD-10-PCS | Mod: QW,S$GLB,, | Performed by: FAMILY MEDICINE

## 2021-12-02 PROCEDURE — 99214 OFFICE O/P EST MOD 30 MIN: CPT | Mod: S$GLB,CS,, | Performed by: FAMILY MEDICINE

## 2021-12-02 PROCEDURE — 93005 EKG 12-LEAD: ICD-10-PCS | Mod: S$GLB,,, | Performed by: FAMILY MEDICINE

## 2021-12-02 PROCEDURE — 93005 ELECTROCARDIOGRAM TRACING: CPT | Mod: S$GLB,,, | Performed by: FAMILY MEDICINE

## 2021-12-02 PROCEDURE — 93010 ELECTROCARDIOGRAM REPORT: CPT | Mod: S$GLB,,, | Performed by: INTERNAL MEDICINE

## 2021-12-02 PROCEDURE — 99214 PR OFFICE/OUTPT VISIT, EST, LEVL IV, 30-39 MIN: ICD-10-PCS | Mod: S$GLB,CS,, | Performed by: FAMILY MEDICINE

## 2021-12-02 PROCEDURE — 87502 INFLUENZA DNA AMP PROBE: CPT | Mod: QW,S$GLB,, | Performed by: FAMILY MEDICINE

## 2021-12-02 PROCEDURE — 71046 X-RAY EXAM CHEST 2 VIEWS: CPT | Mod: FY,S$GLB,, | Performed by: RADIOLOGY

## 2021-12-02 PROCEDURE — 71046 XR CHEST PA AND LATERAL: ICD-10-PCS | Mod: FY,S$GLB,, | Performed by: RADIOLOGY

## 2021-12-02 PROCEDURE — 87502 POCT INFLUENZA A/B MOLECULAR: ICD-10-PCS | Mod: QW,S$GLB,, | Performed by: FAMILY MEDICINE

## 2021-12-02 PROCEDURE — U0002 COVID-19 LAB TEST NON-CDC: HCPCS | Mod: QW,S$GLB,, | Performed by: FAMILY MEDICINE

## 2021-12-02 PROCEDURE — 93010 EKG 12-LEAD: ICD-10-PCS | Mod: S$GLB,,, | Performed by: INTERNAL MEDICINE

## 2021-12-02 RX ORDER — PANTOPRAZOLE SODIUM 40 MG/1
40 TABLET, DELAYED RELEASE ORAL DAILY
Qty: 30 TABLET | Refills: 1 | Status: SHIPPED | OUTPATIENT
Start: 2021-12-02 | End: 2022-02-21

## 2021-12-07 ENCOUNTER — EXTERNAL HOSPITAL ADMISSION (OUTPATIENT)
Dept: ADMINISTRATIVE | Facility: CLINIC | Age: 51
End: 2021-12-07
Payer: COMMERCIAL

## 2021-12-07 ENCOUNTER — PATIENT OUTREACH (OUTPATIENT)
Dept: ADMINISTRATIVE | Facility: CLINIC | Age: 51
End: 2021-12-07
Payer: COMMERCIAL

## 2021-12-10 ENCOUNTER — OFFICE VISIT (OUTPATIENT)
Dept: HOME HEALTH SERVICES | Facility: CLINIC | Age: 51
End: 2021-12-10
Payer: MEDICARE

## 2021-12-10 VITALS — TEMPERATURE: 98 F | RESPIRATION RATE: 18 BRPM | HEART RATE: 102 BPM | OXYGEN SATURATION: 97 %

## 2021-12-10 DIAGNOSIS — I31.9 PERICARDITIS, UNSPECIFIED CHRONICITY, UNSPECIFIED TYPE: Primary | ICD-10-CM

## 2021-12-10 PROCEDURE — 99350 PR HOME VISIT,ESTAB PATIENT,LEVEL IV: ICD-10-PCS | Mod: S$GLB,,, | Performed by: NURSE PRACTITIONER

## 2021-12-10 PROCEDURE — 99350 HOME/RES VST EST HIGH MDM 60: CPT | Mod: S$GLB,,, | Performed by: NURSE PRACTITIONER

## 2021-12-10 RX ORDER — IBUPROFEN 600 MG/1
TABLET ORAL
COMMUNITY
Start: 2021-12-06 | End: 2022-01-11

## 2021-12-10 RX ORDER — COLCHICINE 0.6 MG/1
0.6 TABLET ORAL DAILY
COMMUNITY
End: 2022-02-21

## 2021-12-16 ENCOUNTER — HOSPITAL ENCOUNTER (OUTPATIENT)
Dept: RADIOLOGY | Facility: HOSPITAL | Age: 51
Discharge: HOME | End: 2021-12-16
Attending: FAMILY MEDICINE
Payer: MEDICARE

## 2021-12-16 DIAGNOSIS — M25.579 ANKLE PAIN: ICD-10-CM

## 2021-12-16 DIAGNOSIS — M25.562 LEFT KNEE PAIN: ICD-10-CM

## 2021-12-16 PROCEDURE — 73562 XR KNEE 3 VIEW LEFT: ICD-10-PCS | Mod: 26,HCWC,LT, | Performed by: RADIOLOGY

## 2021-12-16 PROCEDURE — 73562 X-RAY EXAM OF KNEE 3: CPT | Mod: 26,HCWC,LT, | Performed by: RADIOLOGY

## 2021-12-16 PROCEDURE — 73610 XR ANKLE COMPLETE 3 VIEW LEFT: ICD-10-PCS | Mod: 26,HCWC,LT, | Performed by: RADIOLOGY

## 2021-12-16 PROCEDURE — 73610 X-RAY EXAM OF ANKLE: CPT | Mod: 26,HCWC,LT, | Performed by: RADIOLOGY

## 2021-12-16 PROCEDURE — 73610 X-RAY EXAM OF ANKLE: CPT | Mod: TC,HCWC,FY,LT

## 2021-12-16 PROCEDURE — 73562 X-RAY EXAM OF KNEE 3: CPT | Mod: TC,HCWC,FY,LT

## 2021-12-23 ENCOUNTER — TELEPHONE (OUTPATIENT)
Dept: OBSTETRICS AND GYNECOLOGY | Facility: CLINIC | Age: 51
End: 2021-12-23
Payer: COMMERCIAL

## 2021-12-30 ENCOUNTER — OFFICE VISIT (OUTPATIENT)
Dept: URGENT CARE | Facility: CLINIC | Age: 51
End: 2021-12-30
Payer: MEDICARE

## 2021-12-30 VITALS
BODY MASS INDEX: 34.01 KG/M2 | WEIGHT: 192 LBS | RESPIRATION RATE: 20 BRPM | HEART RATE: 100 BPM | SYSTOLIC BLOOD PRESSURE: 150 MMHG | DIASTOLIC BLOOD PRESSURE: 85 MMHG | OXYGEN SATURATION: 96 % | TEMPERATURE: 99 F

## 2021-12-30 DIAGNOSIS — R05.9 COUGH: Primary | ICD-10-CM

## 2021-12-30 DIAGNOSIS — U07.1 LAB TEST POSITIVE FOR DETECTION OF COVID-19 VIRUS: ICD-10-CM

## 2021-12-30 DIAGNOSIS — J06.9 URI, ACUTE: ICD-10-CM

## 2021-12-30 LAB
CTP QC/QA: YES
SARS-COV-2 RDRP RESP QL NAA+PROBE: POSITIVE

## 2021-12-30 PROCEDURE — 3008F PR BODY MASS INDEX (BMI) DOCUMENTED: ICD-10-PCS | Mod: CPTII,S$GLB,, | Performed by: FAMILY MEDICINE

## 2021-12-30 PROCEDURE — 3077F SYST BP >= 140 MM HG: CPT | Mod: CPTII,S$GLB,, | Performed by: FAMILY MEDICINE

## 2021-12-30 PROCEDURE — 1160F RVW MEDS BY RX/DR IN RCRD: CPT | Mod: CPTII,S$GLB,, | Performed by: FAMILY MEDICINE

## 2021-12-30 PROCEDURE — 1159F PR MEDICATION LIST DOCUMENTED IN MEDICAL RECORD: ICD-10-PCS | Mod: CPTII,S$GLB,, | Performed by: FAMILY MEDICINE

## 2021-12-30 PROCEDURE — 1160F PR REVIEW ALL MEDS BY PRESCRIBER/CLIN PHARMACIST DOCUMENTED: ICD-10-PCS | Mod: CPTII,S$GLB,, | Performed by: FAMILY MEDICINE

## 2021-12-30 PROCEDURE — 3079F PR MOST RECENT DIASTOLIC BLOOD PRESSURE 80-89 MM HG: ICD-10-PCS | Mod: CPTII,S$GLB,, | Performed by: FAMILY MEDICINE

## 2021-12-30 PROCEDURE — 3008F BODY MASS INDEX DOCD: CPT | Mod: CPTII,S$GLB,, | Performed by: FAMILY MEDICINE

## 2021-12-30 PROCEDURE — 3077F PR MOST RECENT SYSTOLIC BLOOD PRESSURE >= 140 MM HG: ICD-10-PCS | Mod: CPTII,S$GLB,, | Performed by: FAMILY MEDICINE

## 2021-12-30 PROCEDURE — 99213 OFFICE O/P EST LOW 20 MIN: CPT | Mod: S$GLB,,, | Performed by: FAMILY MEDICINE

## 2021-12-30 PROCEDURE — U0002: ICD-10-PCS | Mod: QW,S$GLB,, | Performed by: FAMILY MEDICINE

## 2021-12-30 PROCEDURE — U0002 COVID-19 LAB TEST NON-CDC: HCPCS | Mod: QW,S$GLB,, | Performed by: FAMILY MEDICINE

## 2021-12-30 PROCEDURE — 99213 PR OFFICE/OUTPT VISIT, EST, LEVL III, 20-29 MIN: ICD-10-PCS | Mod: S$GLB,,, | Performed by: FAMILY MEDICINE

## 2021-12-30 PROCEDURE — 3079F DIAST BP 80-89 MM HG: CPT | Mod: CPTII,S$GLB,, | Performed by: FAMILY MEDICINE

## 2021-12-30 PROCEDURE — 1159F MED LIST DOCD IN RCRD: CPT | Mod: CPTII,S$GLB,, | Performed by: FAMILY MEDICINE

## 2021-12-30 RX ORDER — LORATADINE 10 MG/1
10 TABLET ORAL DAILY
Qty: 30 TABLET | Refills: 0 | Status: SHIPPED | OUTPATIENT
Start: 2021-12-30 | End: 2023-12-14

## 2022-01-05 ENCOUNTER — LAB VISIT (OUTPATIENT)
Dept: PRIMARY CARE CLINIC | Facility: CLINIC | Age: 52
End: 2022-01-05
Payer: MEDICARE

## 2022-01-05 DIAGNOSIS — Z20.822 CONTACT WITH AND (SUSPECTED) EXPOSURE TO COVID-19: ICD-10-CM

## 2022-01-05 LAB
CTP QC/QA: YES
SARS-COV-2 AG RESP QL IA.RAPID: NEGATIVE

## 2022-01-05 PROCEDURE — 87811 SARS-COV-2 COVID19 W/OPTIC: CPT | Mod: HCWC

## 2022-01-07 ENCOUNTER — TELEPHONE (OUTPATIENT)
Dept: OBSTETRICS AND GYNECOLOGY | Facility: CLINIC | Age: 52
End: 2022-01-07
Payer: COMMERCIAL

## 2022-01-07 NOTE — TELEPHONE ENCOUNTER
----- Message from Hieu White sent at 1/7/2022 10:53 AM CST -----  Contact: self  Giovanna Medina  MRN: [id number]  Home Phone      [phone]  Work Phone      Not on file.  Mobile          [phone]    Patient Care Team:  Carmina Cordova MD (Cindy) as PCP - General (Family Medicine)  Carmina Cordova MD (Cindy)  OB? No  What phone number can you be reached at? [phone]  Message: Patient is stating she went to er and  told her she can possibly have uterine cancer. She is wanting sooner appt than I can schedule. She would be a NP. Please return call.

## 2022-01-11 ENCOUNTER — OFFICE VISIT (OUTPATIENT)
Dept: OBSTETRICS AND GYNECOLOGY | Facility: CLINIC | Age: 52
End: 2022-01-11
Payer: MEDICARE

## 2022-01-11 ENCOUNTER — LAB VISIT (OUTPATIENT)
Dept: LAB | Facility: HOSPITAL | Age: 52
End: 2022-01-11
Attending: OBSTETRICS & GYNECOLOGY
Payer: COMMERCIAL

## 2022-01-11 VITALS
BODY MASS INDEX: 32.38 KG/M2 | SYSTOLIC BLOOD PRESSURE: 142 MMHG | HEIGHT: 63 IN | DIASTOLIC BLOOD PRESSURE: 84 MMHG | WEIGHT: 182.75 LBS

## 2022-01-11 DIAGNOSIS — N92.6 IRREGULAR BLEEDING: ICD-10-CM

## 2022-01-11 DIAGNOSIS — N92.6 IRREGULAR BLEEDING: Primary | ICD-10-CM

## 2022-01-11 DIAGNOSIS — R10.33 PERIUMBILICAL PAIN: ICD-10-CM

## 2022-01-11 DIAGNOSIS — N89.8 VAGINAL DISCHARGE: ICD-10-CM

## 2022-01-11 LAB
BASOPHILS # BLD AUTO: 0.06 K/UL (ref 0–0.2)
BASOPHILS NFR BLD: 0.9 % (ref 0–1.9)
DIFFERENTIAL METHOD: ABNORMAL
EOSINOPHIL # BLD AUTO: 0.2 K/UL (ref 0–0.5)
EOSINOPHIL NFR BLD: 2.8 % (ref 0–8)
ERYTHROCYTE [DISTWIDTH] IN BLOOD BY AUTOMATED COUNT: 14.8 % (ref 11.5–14.5)
HCT VFR BLD AUTO: 39.8 % (ref 37–48.5)
HGB BLD-MCNC: 12.2 G/DL (ref 12–16)
IMM GRANULOCYTES # BLD AUTO: 0.02 K/UL (ref 0–0.04)
IMM GRANULOCYTES NFR BLD AUTO: 0.3 % (ref 0–0.5)
LYMPHOCYTES # BLD AUTO: 2.6 K/UL (ref 1–4.8)
LYMPHOCYTES NFR BLD: 36.7 % (ref 18–48)
MCH RBC QN AUTO: 25.4 PG (ref 27–31)
MCHC RBC AUTO-ENTMCNC: 30.7 G/DL (ref 32–36)
MCV RBC AUTO: 83 FL (ref 82–98)
MONOCYTES # BLD AUTO: 0.6 K/UL (ref 0.3–1)
MONOCYTES NFR BLD: 8.1 % (ref 4–15)
NEUTROPHILS # BLD AUTO: 3.6 K/UL (ref 1.8–7.7)
NEUTROPHILS NFR BLD: 51.2 % (ref 38–73)
NRBC BLD-RTO: 0 /100 WBC
PLATELET # BLD AUTO: 495 K/UL (ref 150–450)
PMV BLD AUTO: 9 FL (ref 9.2–12.9)
RBC # BLD AUTO: 4.8 M/UL (ref 4–5.4)
TSH SERPL DL<=0.005 MIU/L-ACNC: 0.48 UIU/ML (ref 0.4–4)
WBC # BLD AUTO: 7.05 K/UL (ref 3.9–12.7)

## 2022-01-11 PROCEDURE — 86592 SYPHILIS TEST NON-TREP QUAL: CPT | Mod: HCWC | Performed by: OBSTETRICS & GYNECOLOGY

## 2022-01-11 PROCEDURE — 3008F PR BODY MASS INDEX (BMI) DOCUMENTED: ICD-10-PCS | Mod: HCWC,CPTII,S$GLB, | Performed by: OBSTETRICS & GYNECOLOGY

## 2022-01-11 PROCEDURE — 36415 COLL VENOUS BLD VENIPUNCTURE: CPT | Mod: HCWC,PN | Performed by: OBSTETRICS & GYNECOLOGY

## 2022-01-11 PROCEDURE — 3079F PR MOST RECENT DIASTOLIC BLOOD PRESSURE 80-89 MM HG: ICD-10-PCS | Mod: HCWC,CPTII,S$GLB, | Performed by: OBSTETRICS & GYNECOLOGY

## 2022-01-11 PROCEDURE — 1160F RVW MEDS BY RX/DR IN RCRD: CPT | Mod: HCWC,CPTII,S$GLB, | Performed by: OBSTETRICS & GYNECOLOGY

## 2022-01-11 PROCEDURE — 87491 CHLMYD TRACH DNA AMP PROBE: CPT | Mod: HCWC | Performed by: OBSTETRICS & GYNECOLOGY

## 2022-01-11 PROCEDURE — 1160F PR REVIEW ALL MEDS BY PRESCRIBER/CLIN PHARMACIST DOCUMENTED: ICD-10-PCS | Mod: HCWC,CPTII,S$GLB, | Performed by: OBSTETRICS & GYNECOLOGY

## 2022-01-11 PROCEDURE — 99204 OFFICE O/P NEW MOD 45 MIN: CPT | Mod: HCWC,S$GLB,, | Performed by: OBSTETRICS & GYNECOLOGY

## 2022-01-11 PROCEDURE — 87591 N.GONORRHOEAE DNA AMP PROB: CPT | Mod: HCWC | Performed by: OBSTETRICS & GYNECOLOGY

## 2022-01-11 PROCEDURE — 99999 PR PBB SHADOW E&M-EST. PATIENT-LVL III: CPT | Mod: PBBFAC,HCWC,, | Performed by: OBSTETRICS & GYNECOLOGY

## 2022-01-11 PROCEDURE — 1159F PR MEDICATION LIST DOCUMENTED IN MEDICAL RECORD: ICD-10-PCS | Mod: HCWC,CPTII,S$GLB, | Performed by: OBSTETRICS & GYNECOLOGY

## 2022-01-11 PROCEDURE — 3077F SYST BP >= 140 MM HG: CPT | Mod: HCWC,CPTII,S$GLB, | Performed by: OBSTETRICS & GYNECOLOGY

## 2022-01-11 PROCEDURE — 3079F DIAST BP 80-89 MM HG: CPT | Mod: HCWC,CPTII,S$GLB, | Performed by: OBSTETRICS & GYNECOLOGY

## 2022-01-11 PROCEDURE — 87389 HIV-1 AG W/HIV-1&-2 AB AG IA: CPT | Mod: HCWC | Performed by: OBSTETRICS & GYNECOLOGY

## 2022-01-11 PROCEDURE — 1159F MED LIST DOCD IN RCRD: CPT | Mod: HCWC,CPTII,S$GLB, | Performed by: OBSTETRICS & GYNECOLOGY

## 2022-01-11 PROCEDURE — 3077F PR MOST RECENT SYSTOLIC BLOOD PRESSURE >= 140 MM HG: ICD-10-PCS | Mod: HCWC,CPTII,S$GLB, | Performed by: OBSTETRICS & GYNECOLOGY

## 2022-01-11 PROCEDURE — 99999 PR PBB SHADOW E&M-EST. PATIENT-LVL III: ICD-10-PCS | Mod: PBBFAC,HCWC,, | Performed by: OBSTETRICS & GYNECOLOGY

## 2022-01-11 PROCEDURE — 87481 CANDIDA DNA AMP PROBE: CPT | Mod: 59,HCWC | Performed by: OBSTETRICS & GYNECOLOGY

## 2022-01-11 PROCEDURE — 85025 COMPLETE CBC W/AUTO DIFF WBC: CPT | Mod: HCWC | Performed by: OBSTETRICS & GYNECOLOGY

## 2022-01-11 PROCEDURE — 88175 CYTOPATH C/V AUTO FLUID REDO: CPT | Mod: HCWC | Performed by: OBSTETRICS & GYNECOLOGY

## 2022-01-11 PROCEDURE — 99204 PR OFFICE/OUTPT VISIT, NEW, LEVL IV, 45-59 MIN: ICD-10-PCS | Mod: HCWC,S$GLB,, | Performed by: OBSTETRICS & GYNECOLOGY

## 2022-01-11 PROCEDURE — 80074 ACUTE HEPATITIS PANEL: CPT | Mod: HCWC | Performed by: OBSTETRICS & GYNECOLOGY

## 2022-01-11 PROCEDURE — 3008F BODY MASS INDEX DOCD: CPT | Mod: HCWC,CPTII,S$GLB, | Performed by: OBSTETRICS & GYNECOLOGY

## 2022-01-11 PROCEDURE — 84443 ASSAY THYROID STIM HORMONE: CPT | Mod: HCWC | Performed by: OBSTETRICS & GYNECOLOGY

## 2022-01-11 RX ORDER — ISOPROPYL ALCOHOL 0.75 G/1
SWAB TOPICAL
COMMUNITY
Start: 2021-12-08

## 2022-01-11 NOTE — PROGRESS NOTES
"CC: irregular bleeding      Giovanna Medina is a 51 y.o. female  presents for irregular bleeding  She was diagnosed with covid  21 and earlier in   She was on protonix and motrin and was having some abnormal bleeding  LMP-  Years ago prior to that.   She has concerns for possible STD exposure    Past Medical History:   Diagnosis Date    Diabetes mellitus, type 2     Glaucoma suspect of both eyes     Optic atrophy, unspecified        Past Surgical History:   Procedure Laterality Date    BRAIN SURGERY  2005     SECTION, CLASSIC      TUBAL LIGATION         OB History    Para Term  AB Living   3 3 3     3   SAB IAB Ectopic Multiple Live Births           3      # Outcome Date GA Lbr Christopher/2nd Weight Sex Delivery Anes PTL Lv   3 Term            2 Term            1 Term                Family History   Problem Relation Age of Onset    Diabetes Mother     Cataracts Mother     Glaucoma Mother     Stroke Mother     Heart disease Mother     Diabetes Brother     Breast cancer Maternal Grandmother     Blindness Neg Hx     Macular degeneration Neg Hx     Retinal detachment Neg Hx     Colon cancer Neg Hx     Ovarian cancer Neg Hx        Social History     Tobacco Use    Smoking status: Never Smoker    Smokeless tobacco: Never Used   Substance Use Topics    Alcohol use: No    Drug use: No       BP (!) 142/84   Ht 5' 3" (1.6 m)   Wt 82.9 kg (182 lb 12.2 oz)   BMI 32.37 kg/m²     ROS:  GENERAL: Denies weight gain or weight loss. Feeling well overall.   SKIN: Denies rash or lesions.   HEAD: Denies head injury or headache.   NODES: Denies enlarged lymph nodes.   CHEST: Denies chest pain or shortness of breath.   CARDIOVASCULAR: Denies palpitations or left sided chest pain.   ABDOMEN: No abdominal pain, constipation, diarrhea, nausea, vomiting or rectal bleeding.   URINARY: No frequency, dysuria, hematuria, or burning on urination.  REPRODUCTIVE: See HPI.   BREASTS: The " patient performs breast self-examination and denies pain, lumps, or nipple discharge.   HEMATOLOGIC: No easy bruisability or excessive bleeding.  MUSCULOSKELETAL: Denies joint pain or swelling.   NEUROLOGIC: Denies syncope or weakness.   PSYCHIATRIC: Denies depression, anxiety or mood swings.    Physical Exam:    APPEARANCE: Well nourished, well developed, in no acute distress.  AFFECT: WNL, alert and oriented x 3  SKIN: No acne or hirsutism  NECK: Neck symmetric without masses or thyromegaly  NODES: No inguinal, cervical, axillary, or femoral lymph node enlargement  CHEST: Good respiratory effect  ABDOMEN: Soft.  No tenderness or masses.  No hepatosplenomegaly.  No hernias.  PELVIC: Normal external genitalia without lesions.  Normal hair distribution.  Adequate perineal body, normal urethral meatus.  Vagina moist and well rugated without lesions yellow discharge.  Cervix pink/ red, without lesions, discharge or tenderness.  No significant cystocele or rectocele.  Bimanual exam shows uterus to be normal size, regular, mobile and nontender.  Adnexa without masses or tenderness.    EXTREMITIES: No edema.      ASSESSMENT AND PLAN  1. Irregular bleeding  US Pelvis Comp with Transvag NON-OB (xpd    TSH    CBC Auto Differential    Hepatitis Panel, Acute    RPR    HIV 1/2 Ag/Ab (4th Gen)    Liquid-Based Pap Smear, Screening   2. Periumbilical pain   Hepatitis Panel, Acute   3. Vaginal discharge  C. trachomatis/N. gonorrhoeae by AMP DNA    Vaginosis Screen by DNA Probe     F/U oN US if the endometrium is thickened will consider EMBx  Monitor stress   Consider possible covid complications with abnormal bleeding as well    Vaginitis prevention including :   a. avoiding feminine products such as deoderant soaps, body wash, bubble bath, douches, scented toilet paper, deoderant tampons or pads, baby or feminine wipes, chronic pad use, etc. and   b. avoiding other vulvovaginal irritants such as long hot baths, humidity, tight,  synthetic clothing, chlorine and sitting around in wet bathing suits and   c. wearing cotton underwear, avoiding thong underwear and no underwear to bed and   d. taking showers instead of baths and use a hair dryer on cool setting afterwards to dry and   e.wearing cotton to exercise and shower immediately after exercise and change clothes and   f. using polyurethane condoms without spermicide if sexually active and symptoms are triggered by intercourse;   Diflucan and Mycolog cream use and potential side effects;   -pelvic rest until symptoms resolve.       Patient was counseled today on A.C.S. Pap guidelines and recommendations for yearly pelvic exams, mammograms and monthly self breast exams; to see her PCP for other health maintenance.     F/U with me after US and testing

## 2022-01-12 LAB
HAV IGM SERPL QL IA: NEGATIVE
HBV CORE IGM SERPL QL IA: NEGATIVE
HBV SURFACE AG SERPL QL IA: NEGATIVE
HCV AB SERPL QL IA: NEGATIVE
HIV 1+2 AB+HIV1 P24 AG SERPL QL IA: NEGATIVE
RPR SER QL: NORMAL

## 2022-01-14 LAB
C TRACH DNA SPEC QL NAA+PROBE: NOT DETECTED
N GONORRHOEA DNA SPEC QL NAA+PROBE: NOT DETECTED

## 2022-01-15 LAB
BACTERIAL VAGINOSIS DNA: POSITIVE
CANDIDA GLABRATA DNA: NEGATIVE
CANDIDA KRUSEI DNA: NEGATIVE
CANDIDA RRNA VAG QL PROBE: NEGATIVE
T VAGINALIS RRNA GENITAL QL PROBE: NEGATIVE

## 2022-01-19 ENCOUNTER — TELEPHONE (OUTPATIENT)
Dept: OBSTETRICS AND GYNECOLOGY | Facility: HOSPITAL | Age: 52
End: 2022-01-19
Payer: COMMERCIAL

## 2022-01-19 DIAGNOSIS — B96.89 BACTERIAL VAGINITIS: Primary | ICD-10-CM

## 2022-01-19 DIAGNOSIS — N76.0 BACTERIAL VAGINITIS: Primary | ICD-10-CM

## 2022-01-19 LAB
FINAL PATHOLOGIC DIAGNOSIS: NORMAL
Lab: NORMAL

## 2022-01-19 RX ORDER — METRONIDAZOLE 500 MG/1
500 TABLET ORAL 2 TIMES DAILY
Qty: 14 TABLET | Refills: 0 | Status: SHIPPED | OUTPATIENT
Start: 2022-01-19 | End: 2022-02-02

## 2022-01-19 NOTE — TELEPHONE ENCOUNTER
You can take flagyl Rx for vaginitis. Please avoid alcohol when taking the flagyl   I can do an endometrial biopsy of the uterine lining to rule out any abnormal tissue inside of the uterus Please schedule nichelle for endometrial biopsy Luiza WHITING

## 2022-01-21 ENCOUNTER — TELEPHONE (OUTPATIENT)
Dept: OBSTETRICS AND GYNECOLOGY | Facility: CLINIC | Age: 52
End: 2022-01-21
Payer: COMMERCIAL

## 2022-01-21 ENCOUNTER — PATIENT MESSAGE (OUTPATIENT)
Dept: OBSTETRICS AND GYNECOLOGY | Facility: CLINIC | Age: 52
End: 2022-01-21
Payer: COMMERCIAL

## 2022-01-21 NOTE — TELEPHONE ENCOUNTER
I am recommending the biopsy since the lining is 7 mm and you are having irregular bleeding.  If you would like to discuss at length I am happy to set up a time to discuss   Thanks Bob

## 2022-01-27 ENCOUNTER — OFFICE VISIT (OUTPATIENT)
Dept: OBSTETRICS AND GYNECOLOGY | Facility: CLINIC | Age: 52
End: 2022-01-27
Payer: MEDICARE

## 2022-01-27 DIAGNOSIS — R93.89 THICKENED ENDOMETRIUM: ICD-10-CM

## 2022-01-27 DIAGNOSIS — N95.0 POSTMENOPAUSAL BLEEDING: Primary | ICD-10-CM

## 2022-01-27 PROCEDURE — 1160F RVW MEDS BY RX/DR IN RCRD: CPT | Mod: HCWC,CPTII,95, | Performed by: OBSTETRICS & GYNECOLOGY

## 2022-01-27 PROCEDURE — 1160F PR REVIEW ALL MEDS BY PRESCRIBER/CLIN PHARMACIST DOCUMENTED: ICD-10-PCS | Mod: HCWC,CPTII,95, | Performed by: OBSTETRICS & GYNECOLOGY

## 2022-01-27 PROCEDURE — 1159F MED LIST DOCD IN RCRD: CPT | Mod: HCWC,CPTII,95, | Performed by: OBSTETRICS & GYNECOLOGY

## 2022-01-27 PROCEDURE — 99213 OFFICE O/P EST LOW 20 MIN: CPT | Mod: HCWC,95,, | Performed by: OBSTETRICS & GYNECOLOGY

## 2022-01-27 PROCEDURE — 99213 PR OFFICE/OUTPT VISIT, EST, LEVL III, 20-29 MIN: ICD-10-PCS | Mod: HCWC,95,, | Performed by: OBSTETRICS & GYNECOLOGY

## 2022-01-27 PROCEDURE — 1159F PR MEDICATION LIST DOCUMENTED IN MEDICAL RECORD: ICD-10-PCS | Mod: HCWC,CPTII,95, | Performed by: OBSTETRICS & GYNECOLOGY

## 2022-01-27 NOTE — PROGRESS NOTES
The patient location is: HOME    The chief complaint leading to consultation is: irregular    Visit type: audiovisual    Face to Face time with patient: 15   minutes of total time spent on the encounter, which includes face to face time and non-face to face time preparing to see the patient (eg, review of tests), Obtaining and/or reviewing separately obtained history, Documenting clinical information in the electronic or other health record, Independently interpreting results (not separately reported) and communicating results to the patient/family/caregiver, or Care coordination (not separately reported).         Each patient to whom he or she provides medical services by telemedicine is:  (1) informed of the relationship between the physician and patient and the respective role of any other health care provider with respect to management of the patient; and (2) notified that he or she may decline to receive medical services by telemedicine and may withdraw from such care at any time.    Notes:     Giovanna Medina is a 51 y.o. female  presents for a discussion on the lab tests and US  She is taking flagyl at this time.    US  Irregular menstruation, unspecified     TECHNIQUE:  Transabdominal sonography of the pelvis was performed, followed by transvaginal sonography to better evaluate the uterus and ovaries.     COMPARISON:  None     FINDINGS:  The uterus measures 7.9 cm in length.   The endometrial stripe in this patient of unknown menopausal status measures 7.1 mm which is within normal limits for a premenopausal patient and considered thickened for a postmenopausal patient..  The right ovary measures 3.2 x 1.1 x 1.7 cm.  The left ovary measures 1.5 x 1.1 x 1.1 cm.    Vascular flow demonstrated to both ovaries.  Minimal free fluid noted which is thought to be physiologic.     Impression:     1. Endometrial stripe thickness of 7.1 mm.  Please see discussion above.      Past Medical History:   Diagnosis Date     Diabetes mellitus, type 2     Glaucoma suspect of both eyes     Optic atrophy, unspecified        Past Surgical History:   Procedure Laterality Date    BRAIN SURGERY  2005     SECTION, CLASSIC      TUBAL LIGATION         OB History    Para Term  AB Living   3 3 3     3   SAB IAB Ectopic Multiple Live Births           3      # Outcome Date GA Lbr Christopher/2nd Weight Sex Delivery Anes PTL Lv   3 Term            2 Term            1 Term                Family History   Problem Relation Age of Onset    Diabetes Mother     Cataracts Mother     Glaucoma Mother     Stroke Mother     Heart disease Mother     Diabetes Brother     Breast cancer Maternal Grandmother     Blindness Neg Hx     Macular degeneration Neg Hx     Retinal detachment Neg Hx     Colon cancer Neg Hx     Ovarian cancer Neg Hx        Social History     Tobacco Use    Smoking status: Never Smoker    Smokeless tobacco: Never Used   Substance Use Topics    Alcohol use: No    Drug use: No       There were no vitals taken for this visit.    ROS:  GENERAL: Denies weight gain or weight loss. Feeling well overall.   SKIN: Denies rash or lesions.   HEAD: Denies head injury or headache.   NODES: Denies enlarged lymph nodes.   CHEST: Denies chest pain or shortness of breath.   CARDIOVASCULAR: Denies palpitations or left sided chest pain.   ABDOMEN: No abdominal pain, constipation, diarrhea, nausea, vomiting or rectal bleeding.   URINARY: No frequency, dysuria, hematuria, or burning on urination.  REPRODUCTIVE: See HPI.   BREASTS: The patient performs breast self-examination and denies pain, lumps, or nipple discharge.   HEMATOLOGIC: No easy bruisability or excessive bleeding.  MUSCULOSKELETAL: Denies joint pain or swelling.   NEUROLOGIC: Denies syncope or weakness.   PSYCHIATRIC: Denies depression, anxiety or mood swings.    Physical Exam:    APPEARANCE: Well nourished, well developed, in no acute distress.  AFFECT: WNL,  alert and oriented x 3  SKIN: No acne or hirsutism  NECK: Neck symmetric without masses or thyromegaly  NODES: No inguinal, cervical, axillary, or femoral lymph node enlargement  CHEST: Good respiratory effect  ABDOMEN: Soft.  No tenderness or masses.  No hepatosplenomegaly.  No hernias.  PELVIC: Normal external genitalia without lesions.  Normal hair distribution.  Adequate perineal body, normal urethral meatus.  Vagina moist and well rugated without lesions or discharge.  Cervix pink, without lesions, discharge or tenderness.  No significant cystocele or rectocele.  Bimanual exam shows uterus to be normal size, regular, mobile and nontender.  Adnexa without masses or tenderness.    EXTREMITIES: No edema.      ASSESSMENT AND PLAN  1. Postmenopausal bleeding     2. Thickened endometrium       Bleeding and pain precautions  Discussed her results and reassurance. Will still do EMBx and rule out any pathology    Patient was counseled today on A.C.S. Pap guidelines and recommendations for yearly pelvic exams, mammograms and monthly self breast exams; to see her PCP for other health maintenance.     F/U for EMBx

## 2022-02-18 ENCOUNTER — TELEPHONE (OUTPATIENT)
Dept: OBSTETRICS AND GYNECOLOGY | Facility: CLINIC | Age: 52
End: 2022-02-18
Payer: COMMERCIAL

## 2022-02-18 NOTE — TELEPHONE ENCOUNTER
----- Message from Sumaya Villegas sent at 2/18/2022 11:45 AM CST -----  Pt called would like a call back with advice on how to manage pain she may experience during her procedure on 2/21.     Best contact [phone]

## 2022-02-18 NOTE — TELEPHONE ENCOUNTER
Patient would like to know if she can take ibuprofen before her procedure.  Patient advised that she can take the ibuprofen as instructed on the packaging. Patient verbalized understanding.

## 2022-02-21 ENCOUNTER — PROCEDURE VISIT (OUTPATIENT)
Dept: OBSTETRICS AND GYNECOLOGY | Facility: CLINIC | Age: 52
End: 2022-02-21
Payer: COMMERCIAL

## 2022-02-21 VITALS
SYSTOLIC BLOOD PRESSURE: 142 MMHG | WEIGHT: 179.88 LBS | BODY MASS INDEX: 31.87 KG/M2 | DIASTOLIC BLOOD PRESSURE: 82 MMHG | HEIGHT: 63 IN

## 2022-02-21 DIAGNOSIS — N92.6 IRREGULAR BLEEDING: Primary | ICD-10-CM

## 2022-02-21 PROCEDURE — 58100 ENDOMETRIAL BIOPSY- TODAY: ICD-10-PCS | Mod: HCWC,S$GLB,, | Performed by: OBSTETRICS & GYNECOLOGY

## 2022-02-21 PROCEDURE — 58100 BIOPSY OF UTERUS LINING: CPT | Mod: HCWC,S$GLB,, | Performed by: OBSTETRICS & GYNECOLOGY

## 2022-02-21 PROCEDURE — 88305 TISSUE EXAM BY PATHOLOGIST: CPT | Mod: 26,HCWC,, | Performed by: PATHOLOGY

## 2022-02-21 PROCEDURE — 88305 TISSUE EXAM BY PATHOLOGIST: CPT | Mod: HCWC | Performed by: PATHOLOGY

## 2022-02-21 PROCEDURE — 88305 TISSUE EXAM BY PATHOLOGIST: ICD-10-PCS | Mod: 26,HCWC,, | Performed by: PATHOLOGY

## 2022-02-21 RX ORDER — CALCIUM CITRATE/VITAMIN D3 200MG-6.25
TABLET ORAL
COMMUNITY
Start: 2021-12-29

## 2022-02-21 RX ORDER — PRAVASTATIN SODIUM 40 MG/1
TABLET ORAL
COMMUNITY
Start: 2021-12-29

## 2022-02-21 RX ORDER — METFORMIN HYDROCHLORIDE 500 MG/1
TABLET ORAL
COMMUNITY
Start: 2021-12-29 | End: 2023-07-06 | Stop reason: ALTCHOICE

## 2022-02-21 NOTE — PROCEDURES
Endometrial Biopsy- Today    Date/Time: 2/21/2022 10:15 AM  Performed by: Mar Martinez MD  Authorized by: Mar Martinez MD     Consent:     Consent obtained:  Written    Consent given by:  Patient    Procedural risks discussed:  Bleeding    Patient questions answered: yes      Patient agrees, verbalizes understanding, and wants to proceed: yes      Educational handouts given: yes      Instructions and paperwork completed: yes    Indication:     Indications: Other disorder of menstruation and other abnormal bleeding from female genital tract      Chronicity of post-coital bleeding:  New    Progression of post-coital bleeding:  Unable to specify  Pre-procedure:     Pre-procedure timeout performed: yes    Procedure:     Procedure: endometrial biopsy with Pipelle      A bivalve speculum was placed in the vagina: yes      Cervix cleaned and prepped: yes      A paracervical block was performed: no      An intracervical block was performed: no      The cervix was dilated: no      Uterus sounded: yes      Uterus sound depth (cm):  7    Specimen collected: specimen collected and sent to pathology      Patient tolerated procedure well with no complications: yes

## 2022-02-28 LAB
FINAL PATHOLOGIC DIAGNOSIS: NORMAL
GROSS: NORMAL
Lab: NORMAL

## 2022-03-01 ENCOUNTER — TELEPHONE (OUTPATIENT)
Dept: OBSTETRICS AND GYNECOLOGY | Facility: CLINIC | Age: 52
End: 2022-03-01
Payer: COMMERCIAL

## 2022-03-02 NOTE — TELEPHONE ENCOUNTER
No abnormal cells noted on biopsy.   NO endometrial tissue was noted.  I would recommend a D & C hysteroscopy if you continue to have irregular or heavy bleeding   Bob WHITING

## 2022-03-21 ENCOUNTER — TELEPHONE (OUTPATIENT)
Dept: ENDOSCOPY | Facility: HOSPITAL | Age: 52
End: 2022-03-21
Payer: COMMERCIAL

## 2022-03-21 DIAGNOSIS — Z01.818 PRE-OP TESTING: ICD-10-CM

## 2022-03-21 DIAGNOSIS — Z12.11 SCREENING FOR MALIGNANT NEOPLASM OF COLON: Primary | ICD-10-CM

## 2022-03-21 RX ORDER — SODIUM, POTASSIUM,MAG SULFATES 17.5-3.13G
1 SOLUTION, RECONSTITUTED, ORAL ORAL DAILY
Qty: 1 KIT | Refills: 0 | Status: SHIPPED | OUTPATIENT
Start: 2022-03-21 | End: 2022-03-23

## 2022-03-30 ENCOUNTER — OFFICE VISIT (OUTPATIENT)
Dept: PODIATRY | Facility: CLINIC | Age: 52
End: 2022-03-30
Payer: COMMERCIAL

## 2022-03-30 VITALS
DIASTOLIC BLOOD PRESSURE: 87 MMHG | SYSTOLIC BLOOD PRESSURE: 140 MMHG | WEIGHT: 179 LBS | HEIGHT: 63 IN | HEART RATE: 98 BPM | BODY MASS INDEX: 31.71 KG/M2

## 2022-03-30 DIAGNOSIS — E11.9 ENCOUNTER FOR DIABETIC FOOT EXAM: Primary | ICD-10-CM

## 2022-03-30 DIAGNOSIS — B35.1 ONYCHOMYCOSIS DUE TO DERMATOPHYTE: ICD-10-CM

## 2022-03-30 PROCEDURE — 99999 PR PBB SHADOW E&M-EST. PATIENT-LVL III: ICD-10-PCS | Mod: PBBFAC,HCWC,, | Performed by: STUDENT IN AN ORGANIZED HEALTH CARE EDUCATION/TRAINING PROGRAM

## 2022-03-30 PROCEDURE — 3079F PR MOST RECENT DIASTOLIC BLOOD PRESSURE 80-89 MM HG: ICD-10-PCS | Mod: HCWC,CPTII,S$GLB, | Performed by: STUDENT IN AN ORGANIZED HEALTH CARE EDUCATION/TRAINING PROGRAM

## 2022-03-30 PROCEDURE — 99203 PR OFFICE/OUTPT VISIT, NEW, LEVL III, 30-44 MIN: ICD-10-PCS | Mod: HCWC,S$GLB,, | Performed by: STUDENT IN AN ORGANIZED HEALTH CARE EDUCATION/TRAINING PROGRAM

## 2022-03-30 PROCEDURE — 3079F DIAST BP 80-89 MM HG: CPT | Mod: HCWC,CPTII,S$GLB, | Performed by: STUDENT IN AN ORGANIZED HEALTH CARE EDUCATION/TRAINING PROGRAM

## 2022-03-30 PROCEDURE — 99999 PR PBB SHADOW E&M-EST. PATIENT-LVL III: CPT | Mod: PBBFAC,HCWC,, | Performed by: STUDENT IN AN ORGANIZED HEALTH CARE EDUCATION/TRAINING PROGRAM

## 2022-03-30 PROCEDURE — 3008F PR BODY MASS INDEX (BMI) DOCUMENTED: ICD-10-PCS | Mod: HCWC,CPTII,S$GLB, | Performed by: STUDENT IN AN ORGANIZED HEALTH CARE EDUCATION/TRAINING PROGRAM

## 2022-03-30 PROCEDURE — 3008F BODY MASS INDEX DOCD: CPT | Mod: HCWC,CPTII,S$GLB, | Performed by: STUDENT IN AN ORGANIZED HEALTH CARE EDUCATION/TRAINING PROGRAM

## 2022-03-30 PROCEDURE — 3077F PR MOST RECENT SYSTOLIC BLOOD PRESSURE >= 140 MM HG: ICD-10-PCS | Mod: HCWC,CPTII,S$GLB, | Performed by: STUDENT IN AN ORGANIZED HEALTH CARE EDUCATION/TRAINING PROGRAM

## 2022-03-30 PROCEDURE — 99203 OFFICE O/P NEW LOW 30 MIN: CPT | Mod: HCWC,S$GLB,, | Performed by: STUDENT IN AN ORGANIZED HEALTH CARE EDUCATION/TRAINING PROGRAM

## 2022-03-30 PROCEDURE — 3077F SYST BP >= 140 MM HG: CPT | Mod: HCWC,CPTII,S$GLB, | Performed by: STUDENT IN AN ORGANIZED HEALTH CARE EDUCATION/TRAINING PROGRAM

## 2022-03-30 RX ORDER — NAPROXEN SODIUM 220 MG/1
81 TABLET, FILM COATED ORAL DAILY
COMMUNITY

## 2022-03-30 NOTE — PROGRESS NOTES
Subjective:      Patient ID: Giovanna Medina is a 51 y.o. female.    Chief Complaint: Nail Care (Recently diagnosed with Type 2 Diabetes)    Giovanna is a 51 y.o. female who presents to the clinic upon referral from Dr. Cordova  for evaluation and treatment of diabetic feet. Giovanna has a past medical history of Diabetes mellitus, type 2, Glaucoma suspect of both eyes, and Optic atrophy, unspecified. Patient relates no major problem with feet. Only complaints today consist of here for elongated toenail, relates she cannot bend down and trim them. No other pedal complaints.     PCP: Carmina Cordova MD    Date Last Seen by PCP:     Current shoe gear: Casual shoes    No results found for: HGBA1C          Review of Systems   Constitutional: Negative for chills, decreased appetite, diaphoresis and fever.   HENT: Negative for congestion and hearing loss.    Cardiovascular: Negative for chest pain, claudication, leg swelling and syncope.   Respiratory: Negative for cough and shortness of breath.    Skin: Positive for dry skin and nail changes. Negative for color change, flushing, itching, poor wound healing and rash.   Musculoskeletal: Negative for arthritis, back pain, joint pain and joint swelling.   Gastrointestinal: Negative for nausea and vomiting.   Neurological: Negative for focal weakness, numbness, paresthesias and weakness.   Psychiatric/Behavioral: Negative for altered mental status. The patient is not nervous/anxious.            Objective:      Physical Exam  Constitutional:       General: She is not in acute distress.     Appearance: She is well-developed. She is not diaphoretic.   Cardiovascular:      Comments: Dorsalis pedis and posterior tibial pulses are within normal limits. Skin temperature is within normal limits. Toes are cool to touch and feet are warm proximally. Hair growth is within normal limits. Skin is normotrophic and without hyperpigmentation. No edema noted. No spider veins or varicosities noted,  bilaterally.     Musculoskeletal:         General: No tenderness.      Comments: Adequate joint range of motion without pain, limitation, nor crepitation to bilateral feet and ankle joints. Muscle strength is 5/5 in all groups bilaterally.       Lymphadenopathy:      Comments: Negative lymphangitic streaking    Skin:     General: Skin is warm and dry.      Findings: No lesion.      Comments: Skin is warm and dry, no acute signs of infection noted. No open wounds, macerations or hyperkeratotic lesions, bilaterally.     Toenails are thickened by 2-4 mm's, dystrophic, and are darkened in coloration       Neurological:      Mental Status: She is alert and oriented to person, place, and time.      Sensory: No sensory deficit.      Motor: No abnormal muscle tone.      Comments: Light touch within normal limits. [place] 5.07 monofilamant testing is within normal limits. Vibratory sensation within normal limits, bilaterally.      Psychiatric:         Behavior: Behavior normal.         Thought Content: Thought content normal.         Judgment: Judgment normal.               Assessment:       Encounter Diagnoses   Name Primary?    Onychomycosis due to dermatophyte     Encounter for diabetic foot exam Yes         Plan:       Giovanna was seen today for nail care.    Diagnoses and all orders for this visit:    Encounter for diabetic foot exam    Onychomycosis due to dermatophyte      I counseled the patient on her conditions, their implications and medical management.    As a courtesy, nails debrided 1-5, bilaterally, without incident, pt tolerated procedure well. For future nail debridements, will need to return as proc b    Recommend OTC Kerasal for toenail fungus    Shoe inspection. Diabetic Foot Education. Patient reminded of the importance of good nutrition and blood sugar control to help prevent podiatric complications of diabetes. Patient instructed on proper foot hygeine. We discussed wearing proper shoe gear,  daily foot inspections, never walking without protective shoe gear, never putting sharp instruments to feet, routine podiatric nail visits      RTC in 1 year or PRN

## 2022-04-21 ENCOUNTER — TELEPHONE (OUTPATIENT)
Dept: ENDOSCOPY | Facility: HOSPITAL | Age: 52
End: 2022-04-21
Payer: COMMERCIAL

## 2022-04-29 ENCOUNTER — TELEPHONE (OUTPATIENT)
Dept: ORTHOPEDICS | Facility: CLINIC | Age: 52
End: 2022-04-29
Payer: COMMERCIAL

## 2022-04-29 DIAGNOSIS — M79.644 CHRONIC PAIN OF RIGHT THUMB: Primary | ICD-10-CM

## 2022-04-29 DIAGNOSIS — G89.29 CHRONIC PAIN OF RIGHT THUMB: Primary | ICD-10-CM

## 2022-05-02 ENCOUNTER — HOSPITAL ENCOUNTER (OUTPATIENT)
Dept: RADIOLOGY | Facility: HOSPITAL | Age: 52
Discharge: HOME | End: 2022-05-02
Attending: ORTHOPAEDIC SURGERY
Payer: COMMERCIAL

## 2022-05-02 ENCOUNTER — OFFICE VISIT (OUTPATIENT)
Dept: ORTHOPEDICS | Facility: CLINIC | Age: 52
End: 2022-05-02
Payer: COMMERCIAL

## 2022-05-02 VITALS — BODY MASS INDEX: 31.71 KG/M2 | WEIGHT: 179 LBS | HEIGHT: 63 IN

## 2022-05-02 DIAGNOSIS — M25.552 HIP PAIN, CHRONIC, LEFT: Primary | ICD-10-CM

## 2022-05-02 DIAGNOSIS — G89.29 HIP PAIN, CHRONIC, LEFT: Primary | ICD-10-CM

## 2022-05-02 DIAGNOSIS — M65.4 DE QUERVAIN'S TENOSYNOVITIS, RIGHT: ICD-10-CM

## 2022-05-02 DIAGNOSIS — M79.644 CHRONIC PAIN OF RIGHT THUMB: ICD-10-CM

## 2022-05-02 DIAGNOSIS — G89.29 CHRONIC PAIN OF RIGHT THUMB: ICD-10-CM

## 2022-05-02 PROCEDURE — 3008F PR BODY MASS INDEX (BMI) DOCUMENTED: ICD-10-PCS | Mod: HCWC,CPTII,S$GLB, | Performed by: ORTHOPAEDIC SURGERY

## 2022-05-02 PROCEDURE — 99999 PR PBB SHADOW E&M-EST. PATIENT-LVL III: CPT | Mod: PBBFAC,HCWC,, | Performed by: ORTHOPAEDIC SURGERY

## 2022-05-02 PROCEDURE — 3008F BODY MASS INDEX DOCD: CPT | Mod: HCWC,CPTII,S$GLB, | Performed by: ORTHOPAEDIC SURGERY

## 2022-05-02 PROCEDURE — 20550 PR INJECT TENDON SHEATH/LIGAMENT: ICD-10-PCS | Mod: HCWC,RT,S$GLB, | Performed by: ORTHOPAEDIC SURGERY

## 2022-05-02 PROCEDURE — 99203 OFFICE O/P NEW LOW 30 MIN: CPT | Mod: HCWC,25,S$GLB, | Performed by: ORTHOPAEDIC SURGERY

## 2022-05-02 PROCEDURE — 1159F PR MEDICATION LIST DOCUMENTED IN MEDICAL RECORD: ICD-10-PCS | Mod: HCWC,CPTII,S$GLB, | Performed by: ORTHOPAEDIC SURGERY

## 2022-05-02 PROCEDURE — 99203 PR OFFICE/OUTPT VISIT, NEW, LEVL III, 30-44 MIN: ICD-10-PCS | Mod: HCWC,25,S$GLB, | Performed by: ORTHOPAEDIC SURGERY

## 2022-05-02 PROCEDURE — 73140 XR FINGER 2 OR MORE VIEWS RIGHT: ICD-10-PCS | Mod: 26,HCWC,RT, | Performed by: RADIOLOGY

## 2022-05-02 PROCEDURE — 99999 PR PBB SHADOW E&M-EST. PATIENT-LVL III: ICD-10-PCS | Mod: PBBFAC,HCWC,, | Performed by: ORTHOPAEDIC SURGERY

## 2022-05-02 PROCEDURE — 73140 X-RAY EXAM OF FINGER(S): CPT | Mod: TC,HCWC,PN,RT

## 2022-05-02 PROCEDURE — 1159F MED LIST DOCD IN RCRD: CPT | Mod: HCWC,CPTII,S$GLB, | Performed by: ORTHOPAEDIC SURGERY

## 2022-05-02 PROCEDURE — 73140 X-RAY EXAM OF FINGER(S): CPT | Mod: 26,HCWC,RT, | Performed by: RADIOLOGY

## 2022-05-02 PROCEDURE — 20550 NJX 1 TENDON SHEATH/LIGAMENT: CPT | Mod: HCWC,RT,S$GLB, | Performed by: ORTHOPAEDIC SURGERY

## 2022-05-02 RX ORDER — TRIAMCINOLONE ACETONIDE 40 MG/ML
20 INJECTION, SUSPENSION INTRA-ARTICULAR; INTRAMUSCULAR
Status: COMPLETED | OUTPATIENT
Start: 2022-05-02 | End: 2022-05-02

## 2022-05-02 RX ORDER — MECLIZINE HCL 12.5 MG 12.5 MG/1
1 TABLET ORAL DAILY PRN
COMMUNITY
Start: 2022-03-30

## 2022-05-02 RX ADMIN — TRIAMCINOLONE ACETONIDE 20 MG: 40 INJECTION, SUSPENSION INTRA-ARTICULAR; INTRAMUSCULAR at 10:05

## 2022-05-02 NOTE — PROGRESS NOTES
"Subjective:      Patient ID: Giovanna Medina is a 51 y.o. female.    Chief Complaint: Pain of the Left Hip, Consult, Finger Pain (right Thumb), and Hand Pain (right )      HPI  Giovanna Medina is a  51 y.o. female presenting today for right hand and wrist pain.  There was not a history of trauma.  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      Review of patient's allergies indicates:   Allergen Reactions    Codeine Anaphylaxis         Current Outpatient Medications   Medication Sig Dispense Refill    aspirin 81 MG Chew Take 81 mg by mouth once daily.      BD ALCOHOL SWABS PadM       COMBIGAN 0.2-0.5 % Drop       metFORMIN (GLUCOPHAGE) 500 MG tablet       TRUE METRIX GLUCOSE TEST STRIP Strp       loratadine (CLARITIN) 10 mg tablet Take 1 tablet (10 mg total) by mouth once daily. 30 tablet 0    meclizine (ANTIVERT) 12.5 mg tablet Take 1 tablet by mouth daily as needed.      pravastatin (PRAVACHOL) 40 MG tablet        No current facility-administered medications for this visit.       Past Medical History:   Diagnosis Date    Diabetes mellitus, type 2     Glaucoma suspect of both eyes     Optic atrophy, unspecified        Past Surgical History:   Procedure Laterality Date    BRAIN SURGERY  2005     SECTION, CLASSIC      TUBAL LIGATION         Review of Systems:  ROS    OBJECTIVE:     PHYSICAL EXAM:  Height: 5' 3" (160 cm) Weight: 81.2 kg (179 lb 0.2 oz)  Vitals:    22 1011   Weight: 81.2 kg (179 lb 0.2 oz)   Height: 5' 3" (1.6 m)   PainSc:   5   PainLoc: Hand     Well developed, well nourished female in no acute distress  Alert and oriented x 3  HEENT- Normal exam  Lungs- Clear to auscultation  Heart- Regular rate and rhythm  Abdomen- Soft nontender  Extremity exam- examination right hand there is tenderness over the 1st dorsal compartment of the wrist  Mild " swelling positive Finkelstein test  Tinel sign negative  Range of motion wrist fingers full      RADIOGRAPHS:  AP lateral x-ray right hand demonstrate no bony abnormality  Comments: I have personally reviewed the imaging and I agree with the above radiologist's report.    ASSESSMENT/PLAN:     IMPRESSION:  De Quervain tendinitis right wrist    PLAN:  I explained the nature of the problem to the patient  Recommended injection today  After pause for time-out identified the right wrist  Injected 1st dorsal compartment with combination Kenalog 20 mg 0.5 cc xylocaine sterile technique  She tolerated the procedure well without complication  I have also fitted her for a wrist splint part-time use  Advil or Motrin by mouth follow-up 4-6 weeks         - We talked at length about the anatomy and pathophysiology of   Encounter Diagnosis   Name Primary?    De Quervain's tenosynovitis, right            Disclaimer: This note has been generated using voice-recognition software. There may be typographical errors that have been missed during proof-reading.

## 2022-05-09 ENCOUNTER — TELEPHONE (OUTPATIENT)
Dept: ENDOSCOPY | Facility: HOSPITAL | Age: 52
End: 2022-05-09
Payer: COMMERCIAL

## 2022-05-09 ENCOUNTER — TELEPHONE (OUTPATIENT)
Dept: GASTROENTEROLOGY | Facility: CLINIC | Age: 52
End: 2022-05-09
Payer: COMMERCIAL

## 2022-05-09 RX ORDER — SODIUM, POTASSIUM,MAG SULFATES 17.5-3.13G
1 SOLUTION, RECONSTITUTED, ORAL ORAL DAILY
Qty: 1 KIT | Refills: 0 | Status: SHIPPED | OUTPATIENT
Start: 2022-05-09 | End: 2022-05-11

## 2022-05-09 NOTE — TELEPHONE ENCOUNTER
----- Message from Ivanna Rudd sent at 5/9/2022  7:25 AM CDT -----  Regarding: Medical Advice  Contact: Patient  Patient is requesting a call back regarding covid test scheduled for 0509/2022   Patient stated she was having issues with transportation and would like to know if she can complete on tomorrow   Please Assist     Patient can be reached at [phone]

## 2022-05-09 NOTE — TELEPHONE ENCOUNTER
Left message instructing patient to call dept @ [phone] between 8am-4pm.    Arrival time to be given @ 0900  (Message sent via [facility] portal)

## 2022-05-09 NOTE — TELEPHONE ENCOUNTER
Spoke with patient about arrival time @ 0900  Covid test = Rapid    Prep instructions reviewed: the day before the procedure, follow a clear liquid diet all day, then start the first 1/2 of prep at 5pm and take 2nd 1/2 of prep @ 0400.  Pt must be completely NPO when prep completed @ 0600.              Medications: Do not take Insulin or oral diabetic medications the day of the procedure.  Take as prescribed: heart, seizure and blood pressure medication in the morning with a sip of water (less than an ounce).  Take any breathing medications and bring inhalers to hospital with you Leave all valuables and jewelry at home.     Wear comfortable clothes to procedure to change into hospital gown You cannot drive for 24 hours after your procedure because you will receive sedation for your procedure to make you comfortable.  A ride must be provided at discharge.

## 2022-07-11 ENCOUNTER — TELEPHONE (OUTPATIENT)
Dept: ENDOSCOPY | Facility: HOSPITAL | Age: 52
End: 2022-07-11
Payer: COMMERCIAL

## 2022-07-11 NOTE — TELEPHONE ENCOUNTER
Spoke with patient about arrival time @ 0730.   Covid test = rapid    Prep instructions reviewed: the day before the procedure, follow a clear liquid diet all day, then start the first 1/2 of prep at 5pm and take 2nd 1/2 of prep @ 0230.  Pt must be completely NPO when prep completed @ 0430.              Medications: Do not take Insulin or oral diabetic medications the day of the procedure.  Take as prescribed: heart, seizure and blood pressure medication in the morning with a sip of water (less than an ounce).  Take any breathing medications and bring inhalers to hospital with you Leave all valuables and jewelry at home.     Wear comfortable clothes to procedure to change into hospital gown You cannot drive for 24 hours after your procedure because you will receive sedation for your procedure to make you comfortable.  A ride must be provided at discharge.

## 2022-07-13 ENCOUNTER — LAB VISIT (OUTPATIENT)
Dept: LAB | Facility: HOSPITAL | Age: 52
End: 2022-07-13
Attending: INTERNAL MEDICINE
Payer: COMMERCIAL

## 2022-07-13 ENCOUNTER — PATIENT MESSAGE (OUTPATIENT)
Dept: HEMATOLOGY/ONCOLOGY | Facility: CLINIC | Age: 52
End: 2022-07-13

## 2022-07-13 ENCOUNTER — OFFICE VISIT (OUTPATIENT)
Dept: HEMATOLOGY/ONCOLOGY | Facility: CLINIC | Age: 52
End: 2022-07-13
Payer: COMMERCIAL

## 2022-07-13 ENCOUNTER — ANESTHESIA EVENT (OUTPATIENT)
Dept: ENDOSCOPY | Facility: HOSPITAL | Age: 52
End: 2022-07-13
Payer: COMMERCIAL

## 2022-07-13 ENCOUNTER — TELEPHONE (OUTPATIENT)
Dept: GASTROENTEROLOGY | Facility: CLINIC | Age: 52
End: 2022-07-13
Payer: COMMERCIAL

## 2022-07-13 ENCOUNTER — HOSPITAL ENCOUNTER (OUTPATIENT)
Facility: HOSPITAL | Age: 52
Discharge: HOME | End: 2022-07-13
Attending: INTERNAL MEDICINE | Admitting: INTERNAL MEDICINE
Payer: COMMERCIAL

## 2022-07-13 ENCOUNTER — ANESTHESIA (OUTPATIENT)
Dept: ENDOSCOPY | Facility: HOSPITAL | Age: 52
End: 2022-07-13
Payer: COMMERCIAL

## 2022-07-13 VITALS
WEIGHT: 180 LBS | SYSTOLIC BLOOD PRESSURE: 127 MMHG | OXYGEN SATURATION: 100 % | TEMPERATURE: 98 F | DIASTOLIC BLOOD PRESSURE: 76 MMHG | HEIGHT: 63 IN | HEART RATE: 95 BPM | BODY MASS INDEX: 31.89 KG/M2 | RESPIRATION RATE: 24 BRPM

## 2022-07-13 VITALS
BODY MASS INDEX: 32.06 KG/M2 | HEART RATE: 96 BPM | WEIGHT: 181 LBS | RESPIRATION RATE: 18 BRPM | SYSTOLIC BLOOD PRESSURE: 109 MMHG | OXYGEN SATURATION: 98 % | DIASTOLIC BLOOD PRESSURE: 56 MMHG

## 2022-07-13 DIAGNOSIS — Z12.11 SCREEN FOR COLON CANCER: ICD-10-CM

## 2022-07-13 DIAGNOSIS — I30.1 ACUTE VIRAL PERICARDITIS: ICD-10-CM

## 2022-07-13 DIAGNOSIS — E78.00 ELEVATED CHOLESTEROL: ICD-10-CM

## 2022-07-13 DIAGNOSIS — D75.839 THROMBOCYTOSIS: ICD-10-CM

## 2022-07-13 DIAGNOSIS — D75.839 THROMBOCYTOSIS: Primary | ICD-10-CM

## 2022-07-13 DIAGNOSIS — E11.9 TYPE 2 DIABETES MELLITUS WITHOUT COMPLICATION, WITHOUT LONG-TERM CURRENT USE OF INSULIN: ICD-10-CM

## 2022-07-13 DIAGNOSIS — Z71.89 ADVANCED CARE PLANNING/COUNSELING DISCUSSION: ICD-10-CM

## 2022-07-13 DIAGNOSIS — R42 VERTIGO: ICD-10-CM

## 2022-07-13 LAB
ALBUMIN SERPL BCP-MCNC: 3.8 G/DL (ref 3.5–5.2)
ALP SERPL-CCNC: 79 U/L (ref 55–135)
ALT SERPL W/O P-5'-P-CCNC: 19 U/L (ref 10–44)
ANION GAP SERPL CALC-SCNC: 14 MMOL/L (ref 8–16)
AST SERPL-CCNC: 18 U/L (ref 10–40)
BASOPHILS # BLD AUTO: 0.04 K/UL (ref 0–0.2)
BASOPHILS NFR BLD: 0.6 % (ref 0–1.9)
BILIRUB SERPL-MCNC: 0.4 MG/DL (ref 0.1–1)
BUN SERPL-MCNC: 9 MG/DL (ref 6–20)
CALCIUM SERPL-MCNC: 10.1 MG/DL (ref 8.7–10.5)
CHLORIDE SERPL-SCNC: 104 MMOL/L (ref 95–110)
CO2 SERPL-SCNC: 24 MMOL/L (ref 23–29)
CREAT SERPL-MCNC: 0.9 MG/DL (ref 0.5–1.4)
CRP SERPL-MCNC: 36.8 MG/L (ref 0–8.2)
CTP QC/QA: YES
DIFFERENTIAL METHOD: ABNORMAL
EOSINOPHIL # BLD AUTO: 0.2 K/UL (ref 0–0.5)
EOSINOPHIL NFR BLD: 2.7 % (ref 0–8)
ERYTHROCYTE [DISTWIDTH] IN BLOOD BY AUTOMATED COUNT: 14.4 % (ref 11.5–14.5)
ERYTHROCYTE [SEDIMENTATION RATE] IN BLOOD BY WESTERGREN METHOD: 87 MM/HR (ref 0–20)
EST. GFR  (AFRICAN AMERICAN): >60 ML/MIN/1.73 M^2
EST. GFR  (NON AFRICAN AMERICAN): >60 ML/MIN/1.73 M^2
FERRITIN SERPL-MCNC: 135 NG/ML (ref 20–300)
GLUCOSE SERPL-MCNC: 105 MG/DL (ref 70–110)
HCT VFR BLD AUTO: 41.4 % (ref 37–48.5)
HGB BLD-MCNC: 13 G/DL (ref 12–16)
IMM GRANULOCYTES # BLD AUTO: 0.01 K/UL (ref 0–0.04)
IMM GRANULOCYTES NFR BLD AUTO: 0.2 % (ref 0–0.5)
LDH SERPL L TO P-CCNC: 175 U/L (ref 110–260)
LYMPHOCYTES # BLD AUTO: 2.2 K/UL (ref 1–4.8)
LYMPHOCYTES NFR BLD: 35.8 % (ref 18–48)
MCH RBC QN AUTO: 26.3 PG (ref 27–31)
MCHC RBC AUTO-ENTMCNC: 31.4 G/DL (ref 32–36)
MCV RBC AUTO: 84 FL (ref 82–98)
MONOCYTES # BLD AUTO: 0.4 K/UL (ref 0.3–1)
MONOCYTES NFR BLD: 5.8 % (ref 4–15)
NEUTROPHILS # BLD AUTO: 3.4 K/UL (ref 1.8–7.7)
NEUTROPHILS NFR BLD: 54.9 % (ref 38–73)
NRBC BLD-RTO: 0 /100 WBC
PATH REV BLD -IMP: NORMAL
PLATELET # BLD AUTO: 462 K/UL (ref 150–450)
PMV BLD AUTO: 8.6 FL (ref 9.2–12.9)
POTASSIUM SERPL-SCNC: 4.5 MMOL/L (ref 3.5–5.1)
PROT SERPL-MCNC: 8.8 G/DL (ref 6–8.4)
RBC # BLD AUTO: 4.94 M/UL (ref 4–5.4)
RETICS/RBC NFR AUTO: 1.4 % (ref 0.5–2.5)
SARS-COV-2 AG RESP QL IA.RAPID: NEGATIVE
SODIUM SERPL-SCNC: 142 MMOL/L (ref 136–145)
URATE SERPL-MCNC: 8.2 MG/DL (ref 2.4–5.7)
WBC # BLD AUTO: 6.23 K/UL (ref 3.9–12.7)

## 2022-07-13 PROCEDURE — 99214 OFFICE O/P EST MOD 30 MIN: CPT | Mod: HCWC,S$GLB,, | Performed by: NURSE PRACTITIONER

## 2022-07-13 PROCEDURE — 99499 RISK ADDL DX/OHS AUDIT: ICD-10-PCS | Mod: S$GLB,,, | Performed by: NURSE PRACTITIONER

## 2022-07-13 PROCEDURE — 99499 UNLISTED E&M SERVICE: CPT | Mod: S$GLB,,, | Performed by: NURSE PRACTITIONER

## 2022-07-13 PROCEDURE — 37000008 HC ANESTHESIA 1ST 15 MINUTES: Mod: HCWC | Performed by: INTERNAL MEDICINE

## 2022-07-13 PROCEDURE — 63600175 PHARM REV CODE 636 W HCPCS: Mod: HCWC | Performed by: NURSE ANESTHETIST, CERTIFIED REGISTERED

## 2022-07-13 PROCEDURE — 25000003 PHARM REV CODE 250: Mod: HCWC | Performed by: INTERNAL MEDICINE

## 2022-07-13 PROCEDURE — 82607 VITAMIN B-12: CPT | Mod: HCWC | Performed by: NURSE PRACTITIONER

## 2022-07-13 PROCEDURE — 45380 COLONOSCOPY AND BIOPSY: CPT | Mod: PT,HCWC | Performed by: INTERNAL MEDICINE

## 2022-07-13 PROCEDURE — 1158F PR ADVANCE CARE PLANNING DISCUSS DOCUMENTED IN MEDICAL RECORD: ICD-10-PCS | Mod: HCWC,CPTII,S$GLB, | Performed by: NURSE PRACTITIONER

## 2022-07-13 PROCEDURE — 86140 C-REACTIVE PROTEIN: CPT | Mod: HCWC | Performed by: NURSE PRACTITIONER

## 2022-07-13 PROCEDURE — 85652 RBC SED RATE AUTOMATED: CPT | Mod: HCWC | Performed by: NURSE PRACTITIONER

## 2022-07-13 PROCEDURE — 1159F MED LIST DOCD IN RCRD: CPT | Mod: HCWC,CPTII,S$GLB, | Performed by: NURSE PRACTITIONER

## 2022-07-13 PROCEDURE — 27201012 HC FORCEPS, HOT/COLD, DISP: Mod: HCWC | Performed by: INTERNAL MEDICINE

## 2022-07-13 PROCEDURE — 3078F DIAST BP <80 MM HG: CPT | Mod: HCWC,CPTII,S$GLB, | Performed by: NURSE PRACTITIONER

## 2022-07-13 PROCEDURE — 81207 BCR/ABL1 GENE MINOR BP: CPT | Mod: HCWC | Performed by: NURSE PRACTITIONER

## 2022-07-13 PROCEDURE — 3008F PR BODY MASS INDEX (BMI) DOCUMENTED: ICD-10-PCS | Mod: HCWC,CPTII,S$GLB, | Performed by: NURSE PRACTITIONER

## 2022-07-13 PROCEDURE — 36415 COLL VENOUS BLD VENIPUNCTURE: CPT | Mod: HCWC | Performed by: NURSE PRACTITIONER

## 2022-07-13 PROCEDURE — 99214 PR OFFICE/OUTPT VISIT, EST, LEVL IV, 30-39 MIN: ICD-10-PCS | Mod: HCWC,S$GLB,, | Performed by: NURSE PRACTITIONER

## 2022-07-13 PROCEDURE — 81219 CALR GENE COM VARIANTS: CPT | Mod: HCWC | Performed by: NURSE PRACTITIONER

## 2022-07-13 PROCEDURE — 81339 MPL GENE SEQ ALYS EXON 10: CPT | Mod: HCWC | Performed by: NURSE PRACTITIONER

## 2022-07-13 PROCEDURE — 3074F SYST BP LT 130 MM HG: CPT | Mod: HCWC,CPTII,S$GLB, | Performed by: NURSE PRACTITIONER

## 2022-07-13 PROCEDURE — 81270 JAK2 GENE: CPT | Mod: HCWC | Performed by: NURSE PRACTITIONER

## 2022-07-13 PROCEDURE — 83615 LACTATE (LD) (LDH) ENZYME: CPT | Mod: HCWC | Performed by: NURSE PRACTITIONER

## 2022-07-13 PROCEDURE — 1158F ADVNC CARE PLAN TLK DOCD: CPT | Mod: HCWC,CPTII,S$GLB, | Performed by: NURSE PRACTITIONER

## 2022-07-13 PROCEDURE — 84466 ASSAY OF TRANSFERRIN: CPT | Mod: HCWC | Performed by: NURSE PRACTITIONER

## 2022-07-13 PROCEDURE — 85025 COMPLETE CBC W/AUTO DIFF WBC: CPT | Mod: HCWC | Performed by: NURSE PRACTITIONER

## 2022-07-13 PROCEDURE — 45380 PR COLONOSCOPY,BIOPSY: ICD-10-PCS | Mod: 33,HCWC,, | Performed by: INTERNAL MEDICINE

## 2022-07-13 PROCEDURE — 25000003 PHARM REV CODE 250: Mod: HCWC | Performed by: NURSE ANESTHETIST, CERTIFIED REGISTERED

## 2022-07-13 PROCEDURE — 83020 HEMOGLOBIN ELECTROPHORESIS: CPT | Mod: HCWC | Performed by: NURSE PRACTITIONER

## 2022-07-13 PROCEDURE — 1159F PR MEDICATION LIST DOCUMENTED IN MEDICAL RECORD: ICD-10-PCS | Mod: HCWC,CPTII,S$GLB, | Performed by: NURSE PRACTITIONER

## 2022-07-13 PROCEDURE — 3008F BODY MASS INDEX DOCD: CPT | Mod: HCWC,CPTII,S$GLB, | Performed by: NURSE PRACTITIONER

## 2022-07-13 PROCEDURE — 81206 BCR/ABL1 GENE MAJOR BP: CPT | Mod: HCWC | Performed by: NURSE PRACTITIONER

## 2022-07-13 PROCEDURE — 3074F PR MOST RECENT SYSTOLIC BLOOD PRESSURE < 130 MM HG: ICD-10-PCS | Mod: HCWC,CPTII,S$GLB, | Performed by: NURSE PRACTITIONER

## 2022-07-13 PROCEDURE — 99999 PR PBB SHADOW E&M-EST. PATIENT-LVL III: CPT | Mod: PBBFAC,HCWC,, | Performed by: NURSE PRACTITIONER

## 2022-07-13 PROCEDURE — 80053 COMPREHEN METABOLIC PANEL: CPT | Mod: HCWC | Performed by: NURSE PRACTITIONER

## 2022-07-13 PROCEDURE — 85045 AUTOMATED RETICULOCYTE COUNT: CPT | Mod: HCWC | Performed by: NURSE PRACTITIONER

## 2022-07-13 PROCEDURE — 82728 ASSAY OF FERRITIN: CPT | Mod: HCWC | Performed by: NURSE PRACTITIONER

## 2022-07-13 PROCEDURE — 88305 TISSUE EXAM BY PATHOLOGIST: CPT | Mod: 26,HCWC,, | Performed by: PATHOLOGY

## 2022-07-13 PROCEDURE — 3078F PR MOST RECENT DIASTOLIC BLOOD PRESSURE < 80 MM HG: ICD-10-PCS | Mod: HCWC,CPTII,S$GLB, | Performed by: NURSE PRACTITIONER

## 2022-07-13 PROCEDURE — 88305 TISSUE EXAM BY PATHOLOGIST: ICD-10-PCS | Mod: 26,HCWC,, | Performed by: PATHOLOGY

## 2022-07-13 PROCEDURE — 85060 BLOOD SMEAR INTERPRETATION: CPT | Mod: HCWC,,, | Performed by: PATHOLOGY

## 2022-07-13 PROCEDURE — 88305 TISSUE EXAM BY PATHOLOGIST: CPT | Mod: 59,HCWC | Performed by: PATHOLOGY

## 2022-07-13 PROCEDURE — 45380 COLONOSCOPY AND BIOPSY: CPT | Mod: 33,HCWC,, | Performed by: INTERNAL MEDICINE

## 2022-07-13 PROCEDURE — 82746 ASSAY OF FOLIC ACID SERUM: CPT | Mod: HCWC | Performed by: NURSE PRACTITIONER

## 2022-07-13 PROCEDURE — 37000009 HC ANESTHESIA EA ADD 15 MINS: Mod: HCWC | Performed by: INTERNAL MEDICINE

## 2022-07-13 PROCEDURE — 99999 PR PBB SHADOW E&M-EST. PATIENT-LVL III: ICD-10-PCS | Mod: PBBFAC,HCWC,, | Performed by: NURSE PRACTITIONER

## 2022-07-13 PROCEDURE — 84550 ASSAY OF BLOOD/URIC ACID: CPT | Mod: HCWC | Performed by: NURSE PRACTITIONER

## 2022-07-13 PROCEDURE — 85060 PATHOLOGIST REVIEW: ICD-10-PCS | Mod: HCWC,,, | Performed by: PATHOLOGY

## 2022-07-13 RX ORDER — SODIUM CHLORIDE 0.9 % (FLUSH) 0.9 %
10 SYRINGE (ML) INJECTION
Status: DISCONTINUED | OUTPATIENT
Start: 2022-07-13 | End: 2022-07-13 | Stop reason: HOSPADM

## 2022-07-13 RX ORDER — PROPOFOL 10 MG/ML
VIAL (ML) INTRAVENOUS CONTINUOUS PRN
Status: DISCONTINUED | OUTPATIENT
Start: 2022-07-13 | End: 2022-07-13

## 2022-07-13 RX ORDER — PROPOFOL 10 MG/ML
VIAL (ML) INTRAVENOUS
Status: DISCONTINUED | OUTPATIENT
Start: 2022-07-13 | End: 2022-07-13

## 2022-07-13 RX ORDER — SODIUM CHLORIDE, SODIUM LACTATE, POTASSIUM CHLORIDE, CALCIUM CHLORIDE 600; 310; 30; 20 MG/100ML; MG/100ML; MG/100ML; MG/100ML
INJECTION, SOLUTION INTRAVENOUS CONTINUOUS PRN
Status: DISCONTINUED | OUTPATIENT
Start: 2022-07-13 | End: 2022-07-13

## 2022-07-13 RX ORDER — SODIUM CHLORIDE 9 MG/ML
INJECTION, SOLUTION INTRAVENOUS CONTINUOUS
Status: DISCONTINUED | OUTPATIENT
Start: 2022-07-13 | End: 2022-07-13 | Stop reason: HOSPADM

## 2022-07-13 RX ORDER — LIDOCAINE HCL/PF 100 MG/5ML
SYRINGE (ML) INTRAVENOUS
Status: DISCONTINUED | OUTPATIENT
Start: 2022-07-13 | End: 2022-07-13

## 2022-07-13 RX ADMIN — SODIUM CHLORIDE, SODIUM LACTATE, POTASSIUM CHLORIDE, AND CALCIUM CHLORIDE: .6; .31; .03; .02 INJECTION, SOLUTION INTRAVENOUS at 08:07

## 2022-07-13 RX ADMIN — PROPOFOL 60 MG: 10 INJECTION, EMULSION INTRAVENOUS at 08:07

## 2022-07-13 RX ADMIN — SODIUM CHLORIDE: 0.9 INJECTION, SOLUTION INTRAVENOUS at 08:07

## 2022-07-13 RX ADMIN — PROPOFOL 150 MCG/KG/MIN: 10 INJECTION, EMULSION INTRAVENOUS at 08:07

## 2022-07-13 RX ADMIN — LIDOCAINE HYDROCHLORIDE 75 MG: 20 INJECTION, SOLUTION INTRAVENOUS at 08:07

## 2022-07-13 NOTE — ANESTHESIA PREPROCEDURE EVALUATION
2022  Giovanna Medina is a 52 y.o., female presents for colonoscopy under MAC.    Past Medical History:   Diagnosis Date    Diabetes mellitus, type 2     Glaucoma suspect of both eyes     Optic atrophy, unspecified      Past Surgical History:   Procedure Laterality Date    BRAIN SURGERY  2005     SECTION, CLASSIC      TUBAL LIGATION             Pre-op Assessment    I have reviewed the Patient Summary Reports.     I have reviewed the Nursing Notes. I have reviewed the NPO Status.   I have reviewed the Medications.     Review of Systems  Anesthesia Hx:  No problems with previous Anesthesia    Cardiovascular:  Cardiovascular Normal     Pulmonary:  Pulmonary Normal    Endocrine:   Diabetes        Physical Exam  General: Well nourished, Cooperative, Alert and Oriented    Airway:  Mallampati: II   Mouth Opening: Normal  TM Distance: Normal  Tongue: Normal    Chest/Lungs:  Clear to auscultation, Normal Respiratory Rate    Heart:  Rate: Normal  Rhythm: Regular Rhythm  Sounds: Normal        Anesthesia Plan  Type of Anesthesia, risks & benefits discussed:    Anesthesia Type: MAC  Intra-op Monitoring Plan: Standard ASA Monitors  Post Op Pain Control Plan: multimodal analgesia  Induction:  IV  Informed Consent: Informed consent signed with the Patient and all parties understand the risks and agree with anesthesia plan.  All questions answered.   ASA Score: 3    Ready For Surgery From Anesthesia Perspective.     .

## 2022-07-13 NOTE — ANESTHESIA POSTPROCEDURE EVALUATION
Anesthesia Post Evaluation    Patient: Giovanna Medina    Procedure(s) Performed: Procedure(s) (LRB):  COLONOSCOPY Suprep (N/A)    Final Anesthesia Type: MAC      Patient location during evaluation: GI PACU  Patient participation: Yes- Able to Participate  Level of consciousness: awake and alert and oriented  Post-procedure vital signs: reviewed and stable  Pain management: adequate  Airway patency: patent    PONV status at discharge: No PONV  Anesthetic complications: no      Cardiovascular status: hemodynamically stable  Respiratory status: room air, spontaneous ventilation and unassisted  Hydration status: euvolemic  Follow-up not needed.          Vitals Value Taken Time   /67 07/13/22 0859   Temp 97.7 07/13/22 0859   Pulse 110 07/13/22 0859   Resp 20 07/13/22 0859   SpO2 99 07/13/22 0859         No case tracking events are documented in the log.      Pain/Lee Ann Score: No data recorded

## 2022-07-13 NOTE — PR[employer]ATION PATIENT INSTRUCTIONS
Discharge Summary/Instructions after an Endoscopic Procedure  Patient Name: Giovanna Medina  Patient MRN: [id number]  Patient YOB: 1970 Wednesday, July 13, 2022  Vishal Mariee MD  Dear patient,  As a result of recent federal legislation (The Federal Cures Act), you may   receive lab or pathology results from your procedure in your [facility]   account before your physician is able to contact you. Your physician or   their representative will relay the results to you with their   recommendations at their soonest availability.  Thank you,  Your health is very important to us during the Covid Crisis. Following your   procedure today, you will receive a daily text for 2 weeks asking about   signs or symptoms of Covid 19.  Please respond to this text when you   receive it so we can follow up and keep you as safe as possible.   RESTRICTIONS:  During your procedure today, you received medications for sedation.  These   medications may affect your judgment, balance and coordination.  Therefore,   for 24 hours, you have the following restrictions:   - DO NOT drive a car, operate machinery, make legal/financial decisions,   sign important papers or drink alcohol.    ACTIVITY:  Today: no heavy lifting, straining or running due to procedural   sedation/anesthesia.  The following day: return to full activity including work.  DIET:  Eat and drink normally unless instructed otherwise.     TREATMENT FOR COMMON SIDE EFFECTS:  - Mild abdominal pain, nausea, belching, bloating or excessive gas:  rest,   eat lightly and use a heating pad.  - Sore Throat: treat with throat lozenges and/or gargle with warm salt   water.  - Because air was used during the procedure, expelling large amounts of air   from your rectum or belching is normal.  - If a bowel prep was taken, you may not have a bowel movement for 1-3 days.    This is normal.  SYMPTOMS TO WATCH FOR AND REPORT TO YOUR PHYSICIAN:  1. Abdominal pain or bloating, other than  gas cramps.  2. Chest pain.  3. Back pain.  4. Signs of infection such as: chills or fever occurring within 24 hours   after the procedure.  5. Rectal bleeding, which would show as bright red, maroon, or black stools.   (A tablespoon of blood from the rectum is not serious, especially if   hemorrhoids are present.)  6. Vomiting.  7. Weakness or dizziness.  GO DIRECTLY TO THE NEAREST EMERGENCY ROOM IF YOU HAVE ANY OF THE FOLLOWING:      Difficulty breathing              Chills and/or fever over 101 F   Persistent vomiting and/or vomiting blood   Severe abdominal pain   Severe chest pain   Black, tarry stools   Bleeding- more than one tablespoon   Any other symptom or condition that you feel may need urgent attention  Your doctor recommends these additional instructions:  If any biopsies were taken, your doctors clinic will contact you in 1 to 2   weeks with any results.  - Discharge patient to home.   - Patient has a contact number available for emergencies.  The signs and   symptoms of potential delayed complications were discussed with the   patient.  Return to normal activities tomorrow.  Written discharge   instructions were provided to the patient.   - Resume previous diet.   - Continue present medications.   - Await pathology results.   - Repeat colonoscopy after studies are complete to check healing.   - Return to my office in 2 weeks. for workup of crohns disease.  For questions, problems or results please call your physician - Vishal Mariee MD.  EMERGENCY PHONE NUMBER: [phone],  LAB RESULTS: [phone]  IF A COMPLICATION OR EMERGENCY SITUATION ARISES AND YOU ARE UNABLE TO REACH   YOUR PHYSICIAN - GO DIRECTLY TO THE EMERGENCY ROOM.  Vishal Mariee MD  7/13/2022 8:59:31 AM  This report has been verified and signed electronically.  Dear patient,  As a result of recent federal legislation (The Federal Cures Act), you may   receive lab or pathology results from your procedure in your [facility]    account before your physician is able to contact you. Your physician or   their representative will relay the results to you with their   recommendations at their soonest availability.  Thank you,  [employer]ATION

## 2022-07-13 NOTE — PROGRESS NOTES
"Patient ID: Giovanna Medina is a 52 y.o. female.    Chief Complaint: thrombocytosis      History     HPI    Giovanna Medina is a 52yr old female referred to hematology for evaluation of thrombocytosis by personal referral. Daughter being seen for ALMA by Dr CHRISTIANO Macdonald in this clinic and pt wanted to be seen by same group. 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  Pt states A1c improved to 5.9 from 7.9 with intermittent metformin usage, weight and diet management which she is continuing to work on. She is currently not on pravastatin for cholesterol stating her cardiologist is giving her a chance as well with weight and diet management. Currently has completed follow up with pulmonology, on  Yearly visits with cardiology. Pt did complete a screening colonoscopy today which she has has per pt report "small ulcers in colon, they said they don't think its cancer but sending specimens to check". Will follow up on this. Pt was on colchicine and ibuprofen for  Pericarditis, discontinued 2022 after DUB. She is on a daily baby asa since pericarditis diagnosis. Pericarditis etiology was suspected as viral illness exposure with grandchildren. Covid diagnosis 2021.  Concern for nodule in long on CXR 3/2022 was clarified as old consolidation, improved, on recommended CT chest with cardiology and pulmonology chart review as well [facility]. Pericarditis was noted as resolved, EF noted as normal.     Family history includes brother  with history of leukemia but death related to medication response/cardiac arrest; one brother had a nasal cancer with surgical/radiation/chemo management and is currently living; another brother  due to leukemia; another brother  after a " type of abdominal cancer,  mother  with CHF complications and aneurysm to heart age 60y, father  due to trauma complications in his mid 30s. Pt's daughter has ALMA.    Pt denies fatigue, flushing, itching, chest pain, sob/carter, leg swelling, n/v/d, abdominal pain, abdominal swelling, hemoptysis, hematemesis, melena, epistaxis, easy bleeding. She has had some intentional weight loss with trying to improve diabetes and cholesterol management.     Past medical, surgical, and medication history, past family history reviewed and uptdated with patient today as noted.      Past Medical History:   Diagnosis Date    Benign paroxysmal vertigo, bilateral     Diabetes mellitus, type 2     Glaucoma suspect of both eyes     History of pericarditis 2021    Hypercholesterolemia     Optic atrophy, unspecified        Past Surgical History:   Procedure Laterality Date    BRAIN SURGERY  2005     SECTION, CLASSIC      TUBAL LIGATION         Oncology History:  Oncology History    No history exists.        Review of Systems:  Review of Systems   Constitutional: Negative for activity change, appetite change, chills, fatigue, fever and unexpected weight change.   HENT: Negative for mouth sores and nosebleeds.    Eyes: Negative for photophobia and visual disturbance.   Respiratory: Negative for chest tightness and shortness of breath.    Cardiovascular: Negative for chest pain, palpitations and leg swelling.   Gastrointestinal: Negative for abdominal pain, blood in stool, change in bowel habit, constipation, diarrhea, nausea, vomiting and change in bowel habit.   Genitourinary: Negative for hematuria and menstrual problem.        Postmenopausal   Musculoskeletal: Negative for gait problem, joint swelling and myalgias.   Integumentary:  Negative for pallor and rash.   Neurological: Negative for dizziness, syncope and weakness.   Hematological: Negative for adenopathy. Does not bruise/bleed easily.    Psychiatric/Behavioral: Negative for sleep disturbance. The patient is not nervous/anxious.           Physical Exam   Vitals:  BP (!) 109/56 (BP Location: Left arm, Patient Position: Sitting, BP Method: Large (Automatic))   Pulse 96   Resp 18   Wt 82.1 kg (181 lb)   LMP  (LMP Unknown)   SpO2 98%   BMI 32.06 kg/m²     Labs:  Lab Visit on 07/13/2022   Component Date Value Ref Range Status    Pathologist Review 07/13/2022 Review required   Final   Admission on 07/13/2022, Discharged on 07/13/2022   Component Date Value Ref Range Status    SARS Coronavirus 2 Antigen 07/13/2022 Negative  Negative Final     Acceptable 07/13/2022 Yes   Final        Lab Results   Component Value Date    WBC 7.05 01/11/2022    HGB 12.2 01/11/2022    HCT 39.8 01/11/2022    MCV 83 01/11/2022     (H) 01/11/2022           CT CHEST WITHOUT CONTRAST 3/18/22  IMPRESSION:     PATCHY SUBPLEURAL AIRSPACE CONSOLIDATION IN THE POSTERIOR RIGHT LOWER LOBE CORRESPONDS TO THE ABNORMALITY SEEN ON RECENT CHEST X-RAY. THIS MAY REPRESENT RESOLVING ATELECTASIS, PNEUMONIA, ETC. THIS APPEARANCE HAS SIGNIFICANTLY IMPROVED COMPARED TO CT SCAN OF CHEST DATED 12/04/2021.     Electronically Signed By: Elian Turner MD 3/18/2022 11:23 AM CDT      TRANSTHORACIC ECHOCARDIOGRAM 3/8/22  Interpretation Summary   Ejection Fraction = 60-65%.   No regional wall motion abnormalities noted.   The left ventricular size, thickness and function are normal   There is no left ventricular diastolic dysfunction.   normal filling pressures   There is no pericardial effusion.   Previous echo 12/2021 had moderate pericardial effusion.     CHEST XRAY 3/8/22  IMPRESSION:   Possible new 1 cm pulmonary nodule lateral right lung base as described above. This was not present on 1/1/2022. Consider CT chest.          Physical Exam:  Physical Exam  Constitutional:       General: She is not in acute distress.     Appearance: Normal appearance.   HENT:      Head:  Normocephalic and atraumatic.      Mouth/Throat:      Mouth: Mucous membranes are moist.      Pharynx: Oropharynx is clear.   Eyes:      General: No scleral icterus.        Right eye: No discharge.         Left eye: No discharge.      Conjunctiva/sclera: Conjunctivae normal.   Cardiovascular:      Rate and Rhythm: Normal rate and regular rhythm.      Pulses: Normal pulses.      Heart sounds: Normal heart sounds. No murmur heard.  Pulmonary:      Effort: Pulmonary effort is normal. No respiratory distress.      Breath sounds: Normal breath sounds.   Abdominal:      General: Bowel sounds are normal. There is no distension.      Palpations: Abdomen is soft. There is no mass.      Tenderness: There is no abdominal tenderness.   Musculoskeletal:         General: Normal range of motion.      Cervical back: Normal range of motion and neck supple. No rigidity.      Right lower leg: No edema.      Left lower leg: No edema.   Skin:     General: Skin is warm and dry.   Neurological:      General: No focal deficit present.      Mental Status: She is alert and oriented to person, place, and time.      Gait: Gait normal.   Psychiatric:         Mood and Affect: Mood normal.         Behavior: Behavior normal.          ECOG:   ECOG SCORE    0 - Fully active-able to carry on all pre-disease performance without restriction          Discussion     Problem List:  Problem List Items Addressed This Visit    None     Visit Diagnoses     Thrombocytosis    -  Primary    Relevant Orders    CBC Auto Differential    Pathologist Interpretation Differential (Completed)    Iron and TIBC    Ferritin    Vitamin B12    FOLATE    C-REACTIVE PROTEIN    Sedimentation rate    Lactate Dehydrogenase    Uric Acid    MPN (JAK2 V617F)WITH REFLEX TO CALR,MPL    BCR/ABL, RNA-QUAL, DIAGNOSTIC w/REFLEX, BLOOD    Comprehensive Metabolic Panel    HEMOGLOBIN ELECTROPHORESIS    Reticulocytes    Type 2 diabetes mellitus without complication, without long-term current  use of insulin        Elevated cholesterol        Vertigo        Advanced care planning/counseling discussion               Thrombocytosis  - Pt with evidence thrombocytosis as early as 2009 by review in epic. Most recent labs are 1/2022 post pericarditis and covid illness which could also have affected platelet values.  - Neg Hep panel, RPR, TSH 1/11/2022.   - Pt denies any symptoms at present.  - She did have a screening colonoscopy today and as above, she states she was told she had small ulcers. Pending full report. If ulcers, there could be occult blood loss, consideration of ALMA which would additionally cause a reactive thrombocytosis.   - Will do a workup with cmp, cbc with periph review, iron, tibc, ferritin, folate, b12, crp, esr, ldh, uric acid, Alex 2 with reflex, BCR:ABL1, retics, hgb electrophoresis  and review with pt, appt follow up pending labs.     Type 2 Diabetes  - Pt reports prescribed metformin but takes only once daily most days  - Denies hyperglycemia s/s at present.   - Self reported, no A1c labs available in records  - Will complete cmp today, it is fasting  - Management to continue per pcp     Acute viral pericarditis  - 12/2021 diagnosis, resolution noted in cardiology and pulmonology follow up with repeated cxr and CT as ntoed above  - rIt was considered likely viral related given she cares for several grandchildren, many uris with grandchildren  - cardiology follow up in 1yr as scheduled 3/2022, pulmonology prn    Elevated Cholesterol  - Pt states she is working on lifestyle changes, weight reduction from 195 to 181 pounds currently she reports since 12/2021  - Not currently taking pravastatin, reports this was in agreement with her cardiologist if she is successful with lifestyle changes.  - Management per cardiology     Vertigo  - intermittent occurrences, no current complaints  - controlled with prn meclizine  - continue to monitor    Advanced Care Planning  - completed today and faxed  for scanning    Advanced Care Planning  Advance care planning  - I initiated the process of advance care planning today and explained the importance of this process to the patient.  I introduced the concept of advance directives to the patient, as well. Then the patient received detailed information about the importance of designating a Health Care Power of  (HCPOA). She was also instructed to communicate with this person about their wishes for future healthcare, should she become sick and lose decision-making capacity. The patient has not previously appointed a HCPOA. After our discussion, the patient has decided to complete a HCPOA and has appointed karson Yeager as primary at [phone], pastor Janes, at [phone] as secondary, and Nitin Washington Sr brother at [phone] as third. The forms were completed and will be scanned into EPIC. I spent a total time of 16 minutes discussing this issue with the patient.      Juanita Seals, BRYONC  [facility]  Hematology/Oncology  [address] [place] [address]  ANASTASIYA Kraus  [id number]  [phone]

## 2022-07-13 NOTE — TELEPHONE ENCOUNTER
----- Message from Vishal Mariee MD sent at 7/13/2022  9:53 AM CDT -----  Regarding: clinic  Needs clinic visit in about 2 -3 weeks  Ok to overbook

## 2022-07-13 NOTE — TRANSFER OF CARE
"Anesthesia Transfer of Care Note    Patient: Giovanna Medina    Procedure(s) Performed: Procedure(s) (LRB):  COLONOSCOPY Suprep (N/A)    Patient location: GI    Anesthesia Type: MAC    Transport from OR: Transported from OR on room air with adequate spontaneous ventilation    Post pain: adequate analgesia    Post assessment: no apparent anesthetic complications and tolerated procedure well    Post vital signs: stable    Level of consciousness: awake    Nausea/Vomiting: no nausea/vomiting    Complications: none    Transfer of care protocol was followed      Last vitals:   Visit Vitals  BP (!) 144/77   Pulse 91   Temp 36.6 °C (97.9 °F)   Resp 20   Ht 5' 3" (1.6 m)   Wt 81.6 kg (180 lb)   LMP  (LMP Unknown)   SpO2 100%   Breastfeeding No   BMI 31.89 kg/m²     "

## 2022-07-13 NOTE — H&P
Short Stay Endoscopy History and Physical    PCP - Carmina Cordova MD    Procedure - Colonoscopy  ASA - per anesthesia  Mallampati - per anesthesia  History of Anesthesia problems - no  Family history Anesthesia problems - no   Plan of anesthesia - General    HPI:  This is a 52 y.o. female here for evaluation of : asymptomatic screening exam      ROS:  Constitutional: No fevers, chills, No weight loss  CV: No chest pain  Pulm: No cough, No shortness of breath  GI: see HPI  Derm: No rash    Medical History:  has a past medical history of Diabetes mellitus, type 2, Glaucoma suspect of both eyes, and Optic atrophy, unspecified.    Surgical History:  has a past surgical history that includes  section, classic; Brain surgery (2005); and Tubal ligation.    Family History: family history includes Breast cancer in her maternal grandmother; Cataracts in her mother; Diabetes in her brother and mother; Glaucoma in her mother; Heart disease in her mother; Stroke in her mother.. Otherwise no colon cancer, inflammatory bowel disease, or GI malignancies.    Social History:  reports that she has never smoked. She has never used smokeless tobacco. She reports that she does not drink alcohol and does not use drugs.    Review of patient's allergies indicates:   Allergen Reactions    Codeine Anaphylaxis       Medications:   Medications Prior to Admission   Medication Sig Dispense Refill Last Dose    aspirin 81 MG Chew Take 81 mg by mouth once daily.       BD ALCOHOL SWABS PadM        COMBIGAN 0.2-0.5 % Drop        loratadine (CLARITIN) 10 mg tablet Take 1 tablet (10 mg total) by mouth once daily. 30 tablet 0     meclizine (ANTIVERT) 12.5 mg tablet Take 1 tablet by mouth daily as needed.       metFORMIN (GLUCOPHAGE) 500 MG tablet        pravastatin (PRAVACHOL) 40 MG tablet        TRUE METRIX GLUCOSE TEST STRIP Strp             Physical Exam:    Vital Signs: There were no vitals filed for this visit.    General  Appearance: Well appearing in no acute distress  Eyes:    No scleral icterus  ENT: Neck supple, Lips, mucosa, and tongue normal; teeth and gums normal  Abdomen: Soft, non tender, non distended with positive bowel sounds. No hepatosplenomegaly, ascites, or mass.  Extremities: 2+ pulses, no clubbing, cyanosis or edema  Skin: No rash      Labs:  Lab Results   Component Value Date    WBC 7.05 01/11/2022    HGB 12.2 01/11/2022    HCT 39.8 01/11/2022     (H) 01/11/2022    ALT 19 06/05/2021    AST 15 06/05/2021     06/05/2021    K 4.2 06/05/2021     06/05/2021    CREATININE 0.8 06/05/2021    BUN 7 06/05/2021    CO2 26 06/05/2021    TSH 0.480 01/11/2022       I have explained the risks and benefits of endoscopy procedures to the patient including but not limited to bleeding, perforation, infection, and death.  The patient was asked if they understand and allowed to ask any further questions to their satisfaction.    Vishal Mariee MD

## 2022-07-14 LAB
FOLATE SERPL-MCNC: 15.7 NG/ML (ref 4–24)
IRON SERPL-MCNC: 40 UG/DL (ref 30–160)
PATH REV BLD -IMP: NORMAL
SATURATED IRON: 11 % (ref 20–50)
TOTAL IRON BINDING CAPACITY: 370 UG/DL (ref 250–450)
TRANSFERRIN SERPL-MCNC: 250 MG/DL (ref 200–375)
VIT B12 SERPL-MCNC: 663 PG/ML (ref 210–950)

## 2022-07-19 LAB
HGB A2 MFR BLD HPLC: 2.5 % (ref 2.2–3.2)
HGB FRACT BLD ELPH-IMP: NORMAL
HGB FRACT BLD ELPH-IMP: NORMAL

## 2022-07-20 LAB
DIAGNOSTIC BCR/ABL 1 RESULT: NORMAL
NARRATIVE DIAGNOSTIC REPORT-IMP: NORMAL
SPECIMEN TYPE, BCR/ABL: NORMAL

## 2022-07-21 LAB
MPNR  FINAL DIAGNOSIS: NORMAL
MPNR  SPECIMEN TYPE: NORMAL
MPNR RESULT: NORMAL

## 2022-07-22 ENCOUNTER — TELEPHONE (OUTPATIENT)
Dept: HEMATOLOGY/ONCOLOGY | Facility: CLINIC | Age: 52
End: 2022-07-22
Payer: COMMERCIAL

## 2022-07-22 DIAGNOSIS — D75.839 THROMBOCYTOSIS: Primary | ICD-10-CM

## 2022-07-22 NOTE — TELEPHONE ENCOUNTER
No visits with results within 1 Day(s) from this visit.   Latest known visit with results is:   Lab Visit on 07/13/2022   Component Date Value Ref Range Status    WBC 07/13/2022 6.23  3.90 - 12.70 K/uL Final    RBC 07/13/2022 4.94  4.00 - 5.40 M/uL Final    Hemoglobin 07/13/2022 13.0  12.0 - 16.0 g/dL Final    Hematocrit 07/13/2022 41.4  37.0 - 48.5 % Final    MCV 07/13/2022 84  82 - 98 fL Final    MCH 07/13/2022 26.3 (A) 27.0 - 31.0 pg Final    MCHC 07/13/2022 31.4 (A) 32.0 - 36.0 g/dL Final    RDW 07/13/2022 14.4  11.5 - 14.5 % Final    Platelets 07/13/2022 462 (A) 150 - 450 K/uL Final    MPV 07/13/2022 8.6 (A) 9.2 - 12.9 fL Final    Immature Granulocytes 07/13/2022 0.2  0.0 - 0.5 % Final    Gran # (ANC) 07/13/2022 3.4  1.8 - 7.7 K/uL Final    Immature Grans (Abs) 07/13/2022 0.01  0.00 - 0.04 K/uL Final    Comment: Mild elevation in immature granulocytes is non specific and   can be seen in a variety of conditions including stress response,   acute inflammation, trauma and pregnancy. Correlation with other   laboratory and clinical findings is essential.      Lymph # 07/13/2022 2.2  1.0 - 4.8 K/uL Final    Mono # 07/13/2022 0.4  0.3 - 1.0 K/uL Final    Eos # 07/13/2022 0.2  0.0 - 0.5 K/uL Final    Baso # 07/13/2022 0.04  0.00 - 0.20 K/uL Final    nRBC 07/13/2022 0  0 /100 WBC Final    Gran % 07/13/2022 54.9  38.0 - 73.0 % Final    Lymph % 07/13/2022 35.8  18.0 - 48.0 % Final    Mono % 07/13/2022 5.8  4.0 - 15.0 % Final    Eosinophil % 07/13/2022 2.7  0.0 - 8.0 % Final    Basophil % 07/13/2022 0.6  0.0 - 1.9 % Final    Differential Method 07/13/2022 Automated   Final    Pathologist Review 07/13/2022 Review required   Final    Iron 07/13/2022 40  30 - 160 ug/dL Final    Transferrin 07/13/2022 250  200 - 375 mg/dL Final    TIBC 07/13/2022 370  250 - 450 ug/dL Final    Saturated Iron 07/13/2022 11 (A) 20 - 50 % Final    Ferritin 07/13/2022 135  20.0 - 300.0 ng/mL Final    Vitamin B-12  07/13/2022 663  210 - 950 pg/mL Final    Folate 07/13/2022 15.7  4.0 - 24.0 ng/mL Final    CRP 07/13/2022 36.8 (A) 0.0 - 8.2 mg/L Final    Sed Rate 07/13/2022 87 (A) 0 - 20 mm/Hr Final    LD 07/13/2022 175  110 - 260 U/L Final    Results are increased in hemolyzed samples.    Uric Acid 07/13/2022 8.2 (A) 2.4 - 5.7 mg/dL Final    MPNR  Specimen type 07/13/2022 unknown   Final    MPNR Result 07/13/2022 see interpretation   Final    MPNR  Final Diagnosis: 07/13/2022 SEE BELOW   Final    Comment: Peripheral blood, JAK2 V617F mutation analysis:    Negative for JAK2 V617F.    Method summary - JAK2 V617F analysis: Quantitative,   allele-specific polymerase chain reaction (PCR) assay was   performed using extracted genomic DNA to evaluate for the   point mutation causing JAK2 V617F. The analytic sensitivity   of this assay has been determined at 0.06%.      Peripheral blood, CALR mutation analysis, exon 9.    Negative. No deletion or insertion was detected in CALR,   exon 9.    Method summary - CALR: Exon 9 of CALR was amplified from   genomic DNA by polymerase chain reaction (PCR). The size of   the PCR product was analyzed by capillary electrophoresis.   The analytic sensitivity of this assay is approximately 6%   for the majority of CALR mutations and is approximately 20%   for the rare type 1-bp deletion.      Peripheral blood, MPL exon 10 mutation analysis:    Negative. No mutation was detected in MPL, exon 10.    Method summary - MPL exon 10 mutation analysis: Genomic DNA   w                           as extracted and Maritza sequencing used to evaluate for   mutations in MPL, exon 10. The sensitivity of this assay is   approximately 20%, such that samples containing lower   percentages of mutated DNA will appear negative.    Comment:  Negative results for VKX4D240A, CALR exon 9, and MPL exon   10 mutations do not exclude the diagnosis of a   myeloproliferative neoplasm. Correlation with clinical and    morphologic finding is recommended for a definitive   diagnosis. Samples containing mutations in these genes at   levels below the analytical sensitivity of each assay may   not be detected by these methods. If there are persistent   unexplained erythrocytosis, JAK2 Exon 12 analysis could be   considered (JAKXB or JAKXM) to rule out polycythemia vera   (PV).    Signing Pathologist: AGATHA Spear D.O.S.    -------------------ADDITIONAL INFORMATION-------------------  This test was developed and its performance characteristics   determined by [facility] in a manner consistent with CL                           IA   requirements. This test has not been cleared or approved by   the U.S. Food and Drug Administration.    Test Performed by:  [place] [address]  : Joel Benito M.D. Ph.D.; CLIA# [id number]      Specimen Type, BCR/ABL 07/13/2022 blood   Corrected    Diagnostic BCR/ABL 1 Result 07/13/2022 see interpretation   Final    Final Diagnosis, BCR/ABL 07/13/2022 SEE BELOW   Final    Comment: Peripheral blood, BCR/ABL1 mRNA analysis, qualitative:    Negative. No BCR/ABL1 mRNA transcripts were detected.    Method summary: The presence or absence of BCR/ABL1 mRNA   transcripts was evaluated using a qualitative, reverse   transcription PCR-based assay. The assay detects nearly all   published and theoretical BCR/ABL1 fusion forms including   the common e13/e14-a2 (p210) and e1-a2 (p190) transcripts,   as well as other rarer variants (e.g. e19-a2 (p230),   e13/e14-a3, e1-a3, etc.). The limit of detection for this   assay is 0.1%. Please contact the lab at [phone] with   questions or if additional testing is required. See [facility] Test Catalog for additional method   details.    Signing Pathologist: Tucker Hernandez M.D.    -------------------ADDITIONAL INFORMATION-------------------  This test was  developed and its performance characteristics   determined by [facility] in a manner consistent with CLIA   requirements. This test has not been cleared or                            approved by   the U.S. Food and Drug Administration.    Test Performed by:  [facility] - [address] [address]  : Joel Benito M.D. Ph.D.; CLIA# [id number]      Sodium 07/13/2022 142  136 - 145 mmol/L Final    Potassium 07/13/2022 4.5  3.5 - 5.1 mmol/L Final    Chloride 07/13/2022 104  95 - 110 mmol/L Final    CO2 07/13/2022 24  23 - 29 mmol/L Final    Glucose 07/13/2022 105  70 - 110 mg/dL Final    BUN 07/13/2022 9  6 - 20 mg/dL Final    Creatinine 07/13/2022 0.9  0.5 - 1.4 mg/dL Final    Calcium 07/13/2022 10.1  8.7 - 10.5 mg/dL Final    Total Protein 07/13/2022 8.8 (A) 6.0 - 8.4 g/dL Final    Albumin 07/13/2022 3.8  3.5 - 5.2 g/dL Final    Total Bilirubin 07/13/2022 0.4  0.1 - 1.0 mg/dL Final    Comment: For infants and newborns, interpretation of results should be based  on gestational age, weight and in agreement with clinical  observations.    Premature Infant recommended reference ranges:  Up to 24 hours.............<8.0 mg/dL  Up to 48 hours............<12.0 mg/dL  3-5 days..................<15.0 mg/dL  6-29 days.................<15.0 mg/dL      Alkaline Phosphatase 07/13/2022 79  55 - 135 U/L Final    AST 07/13/2022 18  10 - 40 U/L Final    ALT 07/13/2022 19  10 - 44 U/L Final    Anion Gap 07/13/2022 14  8 - 16 mmol/L Final    eGFR if African American 07/13/2022 >60  >60 mL/min/1.73 m^2 Final    eGFR if non African American 07/13/2022 >60  >60 mL/min/1.73 m^2 Final    Comment: Calculation used to obtain the estimated glomerular filtration  rate (eGFR) is the CKD-EPI equation.       Hgb A2 Quant 07/13/2022 2.5  2.2 - 3.2 % Final    Hemoglobin Bands 07/13/2022 Hb A and Hb A2   Final    Hemoglobin Electrophoresis Interp 07/13/2022 Normal    Final    Interpreted by Debbie Tyler M.D.    Retic 07/13/2022 1.4  0.5 - 2.5 % Final    Pathologist Review Peripheral Smear 07/13/2022 REVIEWED   Final    Comment:   Electronically reviewed and signed by:  Courtney Avila M.D.  Signed on 07/14/22 at 15:04  PLT: Thrombocytosis with normal morphology.  WBC: Adequate in number, differential, and morphology.  RBC: Normocytic/normochromic. No anemia.       Reviewed labs with pt, inflammatory markers with elevation and platelets elevated at 462. Will repeat labs in 3 months for trending. Jak2, hgb electrophoresis, BCR/ABL negative.

## 2022-07-25 ENCOUNTER — OFFICE VISIT (OUTPATIENT)
Dept: ORTHOPEDICS | Facility: CLINIC | Age: 52
End: 2022-07-25
Payer: COMMERCIAL

## 2022-07-25 VITALS — HEIGHT: 63 IN | WEIGHT: 181 LBS | BODY MASS INDEX: 32.07 KG/M2

## 2022-07-25 DIAGNOSIS — M65.4 DE QUERVAIN'S TENOSYNOVITIS, RIGHT: Primary | ICD-10-CM

## 2022-07-25 PROCEDURE — 3008F PR BODY MASS INDEX (BMI) DOCUMENTED: ICD-10-PCS | Mod: HCWC,CPTII,S$GLB, | Performed by: ORTHOPAEDIC SURGERY

## 2022-07-25 PROCEDURE — 99213 OFFICE O/P EST LOW 20 MIN: CPT | Mod: HCWC,25,S$GLB, | Performed by: ORTHOPAEDIC SURGERY

## 2022-07-25 PROCEDURE — 20550 NJX 1 TENDON SHEATH/LIGAMENT: CPT | Mod: HCWC,RT,S$GLB, | Performed by: ORTHOPAEDIC SURGERY

## 2022-07-25 PROCEDURE — 3008F BODY MASS INDEX DOCD: CPT | Mod: HCWC,CPTII,S$GLB, | Performed by: ORTHOPAEDIC SURGERY

## 2022-07-25 PROCEDURE — 20550 PR INJECT TENDON SHEATH/LIGAMENT: ICD-10-PCS | Mod: HCWC,RT,S$GLB, | Performed by: ORTHOPAEDIC SURGERY

## 2022-07-25 PROCEDURE — 99999 PR PBB SHADOW E&M-EST. PATIENT-LVL III: ICD-10-PCS | Mod: PBBFAC,HCWC,, | Performed by: ORTHOPAEDIC SURGERY

## 2022-07-25 PROCEDURE — 99999 PR PBB SHADOW E&M-EST. PATIENT-LVL III: CPT | Mod: PBBFAC,HCWC,, | Performed by: ORTHOPAEDIC SURGERY

## 2022-07-25 PROCEDURE — 1159F MED LIST DOCD IN RCRD: CPT | Mod: HCWC,CPTII,S$GLB, | Performed by: ORTHOPAEDIC SURGERY

## 2022-07-25 PROCEDURE — 1159F PR MEDICATION LIST DOCUMENTED IN MEDICAL RECORD: ICD-10-PCS | Mod: HCWC,CPTII,S$GLB, | Performed by: ORTHOPAEDIC SURGERY

## 2022-07-25 PROCEDURE — 99213 PR OFFICE/OUTPT VISIT, EST, LEVL III, 20-29 MIN: ICD-10-PCS | Mod: HCWC,25,S$GLB, | Performed by: ORTHOPAEDIC SURGERY

## 2022-07-25 RX ORDER — TRIAMCINOLONE ACETONIDE 40 MG/ML
20 INJECTION, SUSPENSION INTRA-ARTICULAR; INTRAMUSCULAR
Status: COMPLETED | OUTPATIENT
Start: 2022-07-25 | End: 2022-07-25

## 2022-07-25 RX ADMIN — TRIAMCINOLONE ACETONIDE 20 MG: 40 INJECTION, SUSPENSION INTRA-ARTICULAR; INTRAMUSCULAR at 11:07

## 2022-07-25 NOTE — PROGRESS NOTES
"Subjective:      Patient ID: Giovanna Medina is a 52 y.o. female.  Chief Complaint: Wrist Pain (Right wrist dequervain's )      HPI  Giovanna Medina is a  52 y.o. female presenting today for follow up of right de Quervain tendinitis of the wrist.  She reports that she is having a flare-up again with pain in the wrist and difficulty with use  No numbness or tingling is reported.    Review of patient's allergies indicates:   Allergen Reactions    Codeine Anaphylaxis         Current Outpatient Medications   Medication Sig Dispense Refill    aspirin 81 MG Chew Take 81 mg by mouth once daily.      BD ALCOHOL SWABS PadM       COMBIGAN 0.2-0.5 % Drop       loratadine (CLARITIN) 10 mg tablet Take 1 tablet (10 mg total) by mouth once daily. 30 tablet 0    meclizine (ANTIVERT) 12.5 mg tablet Take 1 tablet by mouth daily as needed.      metFORMIN (GLUCOPHAGE) 500 MG tablet       pravastatin (PRAVACHOL) 40 MG tablet       TRUE METRIX GLUCOSE TEST STRIP Strp        No current facility-administered medications for this visit.       Past Medical History:   Diagnosis Date    Benign paroxysmal vertigo, bilateral     Diabetes mellitus, type 2     Glaucoma suspect of both eyes     History of pericarditis 2021    Hypercholesterolemia     Optic atrophy, unspecified        Past Surgical History:   Procedure Laterality Date    BRAIN SURGERY  2005     SECTION, CLASSIC      COLONOSCOPY N/A 2022    Procedure: COLONOSCOPY Suprep;  Surgeon: Vishal Mariee MD;  Location: [facility];  Service: Endoscopy;  Laterality: N/A;    TUBAL LIGATION         OBJECTIVE:   PHYSICAL EXAM:  Height: 5' 3" (160 cm) Weight: 82.1 kg (181 lb)  Vitals:    22 1104   Weight: 82.1 kg (181 lb)   Height: 5' 3" (1.6 m)   PainSc:   3   PainLoc: Wrist     Ortho/SPM Exam  Examination right wrist there is tenderness swelling 1st dorsal compartment the wrist  Positive Finkelstein test  Tinel sign " negative      RADIOGRAPHS:  None  Comments: I have personally reviewed the imaging and I agree with the above radiologist's report.    ASSESSMENT/PLAN:     IMPRESSION:  De Quervain tendinitis right wrist    PLAN:  We talked about options today including injection versus surgery  She would like to try another injection  After pause for time-out identified the right wrist injected 1st dorsal compartment combination Kenalog 20 mg 0.5 cc xylocaine sterile technique  Tolerated the procedure well without complication      FOLLOW UP:  Continue wrist splint as needed 2-3 months    Disclaimer: This note has been generated using voice-recognition software. There may be typographical errors that have been missed during proof-reading.

## 2022-07-27 LAB
FINAL PATHOLOGIC DIAGNOSIS: NORMAL
GROSS: NORMAL
SUPPLEMENTAL DIAGNOSIS: NORMAL

## 2022-07-28 ENCOUNTER — PATIENT MESSAGE (OUTPATIENT)
Dept: GASTROENTEROLOGY | Facility: HOSPITAL | Age: 52
End: 2022-07-28
Payer: COMMERCIAL

## 2022-07-28 DIAGNOSIS — K50.00 CROHN'S DISEASE OF SMALL INTESTINE WITHOUT COMPLICATION: Primary | ICD-10-CM

## 2022-07-29 ENCOUNTER — TELEPHONE (OUTPATIENT)
Dept: GASTROENTEROLOGY | Facility: CLINIC | Age: 52
End: 2022-07-29
Payer: COMMERCIAL

## 2022-07-29 NOTE — TELEPHONE ENCOUNTER
Spoke to patient and schedule her to have her labs done on 08/01/2022 at 9:10 am verbal understanding.

## 2022-08-01 ENCOUNTER — LAB VISIT (OUTPATIENT)
Dept: LAB | Facility: HOSPITAL | Age: 52
End: 2022-08-01
Attending: INTERNAL MEDICINE
Payer: COMMERCIAL

## 2022-08-01 DIAGNOSIS — K50.00 CROHN'S DISEASE OF SMALL INTESTINE WITHOUT COMPLICATION: ICD-10-CM

## 2022-08-01 PROCEDURE — 86480 TB TEST CELL IMMUN MEASURE: CPT | Mod: HCWC | Performed by: INTERNAL MEDICINE

## 2022-08-02 ENCOUNTER — LAB VISIT (OUTPATIENT)
Dept: LAB | Facility: HOSPITAL | Age: 52
End: 2022-08-02
Attending: INTERNAL MEDICINE
Payer: COMMERCIAL

## 2022-08-02 DIAGNOSIS — K50.00 CROHN'S DISEASE OF SMALL INTESTINE WITHOUT COMPLICATION: ICD-10-CM

## 2022-08-02 LAB
GAMMA INTERFERON BACKGROUND BLD IA-ACNC: 0.14 IU/ML
M TB IFN-G CD4+ BCKGRND COR BLD-ACNC: 0.03 IU/ML
MITOGEN IGNF BCKGRD COR BLD-ACNC: 9.86 IU/ML
TB GOLD PLUS: NEGATIVE
TB2 - NIL: 0.08 IU/ML

## 2022-08-02 PROCEDURE — 83993 ASSAY FOR CALPROTECTIN FECAL: CPT | Mod: HCWC | Performed by: INTERNAL MEDICINE

## 2022-08-03 ENCOUNTER — PATIENT MESSAGE (OUTPATIENT)
Dept: GASTROENTEROLOGY | Facility: CLINIC | Age: 52
End: 2022-08-03

## 2022-08-03 ENCOUNTER — OFFICE VISIT (OUTPATIENT)
Dept: GASTROENTEROLOGY | Facility: CLINIC | Age: 52
End: 2022-08-03
Payer: COMMERCIAL

## 2022-08-03 VITALS — BODY MASS INDEX: 33.12 KG/M2 | HEIGHT: 63 IN | WEIGHT: 186.94 LBS

## 2022-08-03 DIAGNOSIS — Z79.1 NSAID LONG-TERM USE: Primary | ICD-10-CM

## 2022-08-03 DIAGNOSIS — K63.9 DISEASE OF INTESTINE, UNSPECIFIED: ICD-10-CM

## 2022-08-03 DIAGNOSIS — K50.00 CROHN'S DISEASE OF SMALL INTESTINE WITHOUT COMPLICATION: ICD-10-CM

## 2022-08-03 PROCEDURE — 3008F BODY MASS INDEX DOCD: CPT | Mod: HCWC,CPTII,S$GLB, | Performed by: INTERNAL MEDICINE

## 2022-08-03 PROCEDURE — 99999 PR PBB SHADOW E&M-EST. PATIENT-LVL III: CPT | Mod: PBBFAC,HCWC,, | Performed by: INTERNAL MEDICINE

## 2022-08-03 PROCEDURE — 1159F PR MEDICATION LIST DOCUMENTED IN MEDICAL RECORD: ICD-10-PCS | Mod: HCWC,CPTII,S$GLB, | Performed by: INTERNAL MEDICINE

## 2022-08-03 PROCEDURE — 99215 OFFICE O/P EST HI 40 MIN: CPT | Mod: HCWC,S$GLB,, | Performed by: INTERNAL MEDICINE

## 2022-08-03 PROCEDURE — 3008F PR BODY MASS INDEX (BMI) DOCUMENTED: ICD-10-PCS | Mod: HCWC,CPTII,S$GLB, | Performed by: INTERNAL MEDICINE

## 2022-08-03 PROCEDURE — 99215 PR OFFICE/OUTPT VISIT, EST, LEVL V, 40-54 MIN: ICD-10-PCS | Mod: HCWC,S$GLB,, | Performed by: INTERNAL MEDICINE

## 2022-08-03 PROCEDURE — 99999 PR PBB SHADOW E&M-EST. PATIENT-LVL III: ICD-10-PCS | Mod: PBBFAC,HCWC,, | Performed by: INTERNAL MEDICINE

## 2022-08-03 PROCEDURE — 1159F MED LIST DOCD IN RCRD: CPT | Mod: HCWC,CPTII,S$GLB, | Performed by: INTERNAL MEDICINE

## 2022-08-03 NOTE — PROGRESS NOTES
GASTROENTEROLOGY CLINIC NOTE    Reason for visit: The primary encounter diagnosis was NSAID long-term use. Diagnoses of Disease of intestine, unspecified and Crohn's disease of small intestine without complication were also pertinent to this visit.  Referring provider/PCP: Carmina Cordova MD    HPI:  Giovanna Medina is a 52 y.o. female here today for ileitis / colitis, concern for possible crohns.    Here to follow up colonoscopy findings. New patient.  She has no abd symptoms, normal bowel habits, 1 -2 times a day, formed. No wt loss. No vomiting. No other symptoms.  Does have hx of pericarditis. 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 4 -6 pills / week maybe. She isnt sure.     Takes baby ASA daily.   Colchicine and 400mg ibuprofen - stopped in February (was on about 2 months in total)  After REPETITIVE ASKING, THEN SHE ADMITS TO TAKING ADVIL MAYBE 4 -6 TABLET PER WEEK STILL     Family hx - 2 brothers with leukemia , worked at chemical plant.   No family hx of crohns disease.    Prior Endoscopy:  EGD:  Colon:  7/2022   Impression:            - Multiple ulcers in the terminal ileum. Biopsied.                          - Congested, eroded, inflamed, scarred and                          ulcerated mucosa in the proximal ascending colon,                          in the cecum and at the ileocecal valve. Biopsied.                          - The descending colon, splenic flexure,                          transverse colon and hepatic flexure are normal.                          Biopsied.                          - Erythematous mucosa in the rectum, in the                          recto-sigmoid colon and in the distal sigmoid                          colon. Biopsied.                          - Diverticulosis in the sigmoid colon.                          - The examination was otherwise  normal on direct                          and retroflexion views.                          Exam done for screening, significant for TI and                          cecal ulcerations typical for Crohns disease, with                          some mild irritation in rectum and distal sigmoid.                          Otherwise normal.   Recommendation:        - Discharge patient to home.                          - Patient has a contact number available for                          emergencies. The signs and symptoms of potential                          delayed complications were discussed with the                          patient. Return to normal activities tomorrow.                          Written discharge instructions were provided to                          the patient.                          - Resume previous diet.                          - Continue present medications.                          - Await pathology results.                          - Repeat colonoscopy after studies are complete to                          check healing.                          - Return to my office in 2 weeks. for workup of                          crohns disease.     PATH  With GI pathologist  Supplemental report for second opinion GI pathology review.   1. Ileum (biopsy):   - Ulcer and active ileitis   - Patchy mild chronic changes   - No granulomas   - No viral inclusions   - No dysplasia   2. Colon, cecum (biopsy):   - Ulcer and chronic active colitis   - Variable severity throughout the biopsy   - No granulomas   - No viral inclusions   3. Colon, transfers (biopsies):   - Histologically normal colonic mucosa   - Negative for active inflammation   - No granulomas   - Negative for dysplasia   4. Colon, distal sigmoid (biopsies):   - Histologically normal colonic mucosa   - Negative for active inflammation   - No granulomas   - Negative for dysplasia   5. Colon, rectum (biopsy):   - Colonic mucosa with prominent lymphoid  aggregates and chronic colitis   including architectural distortion   - Negative for significant active inflammation   - No granulomas, no viral inclusions   ______________________________________________________________________________   ______   Comments:  Findings in the above set of biopsy show ulcer with patchy and   variable chronic active inflammation.  Inflammatory bowel disease is a strong   consideration.  Additional etiologies based on histology alone may include   other forms of ulcer and chronic active inflammation such as ulcer from   medication and ischemia.  Correlation with endoscopic findings and symptom   course is recommended.     (Portions of this note were dictated using voice recognition software and may contain dictation related errors in spelling/grammar/syntax not found on text review)    Review of Systems   Constitutional: Negative for fever and malaise/fatigue.   Respiratory: Negative for cough and shortness of breath.    Cardiovascular: Negative for chest pain and palpitations.   Gastrointestinal: Negative for abdominal pain, blood in stool, nausea and vomiting.   Neurological: Negative for dizziness and headaches.       Past Medical History: has a past medical history of Benign paroxysmal vertigo, bilateral, Diabetes mellitus, type 2, Glaucoma suspect of both eyes, History of pericarditis, Hypercholesterolemia, and Optic atrophy, unspecified.    Past Surgical History: has a past surgical history that includes  section, classic; Brain surgery (2005); Tubal ligation; and Colonoscopy (N/A, 2022).    Family History:family history includes Breast cancer in her maternal grandmother; Cataracts in her mother; Diabetes in her brother and mother; Glaucoma in her mother; Heart disease in her mother; No Known Problems in her father; Stroke in her mother.    Allergies:   Review of patient's allergies indicates:   Allergen Reactions    Codeine Anaphylaxis       Social History:  "reports that she has never smoked. She has never used smokeless tobacco. She reports that she does not drink alcohol and does not use drugs.    Home medications:   Current Outpatient Medications on File Prior to Visit   Medication Sig Dispense Refill    aspirin 81 MG Chew Take 81 mg by mouth once daily.      BD ALCOHOL SWABS PadM       COMBIGAN 0.2-0.5 % Drop       meclizine (ANTIVERT) 12.5 mg tablet Take 1 tablet by mouth daily as needed.      metFORMIN (GLUCOPHAGE) 500 MG tablet       TRUE METRIX GLUCOSE TEST STRIP Strp       loratadine (CLARITIN) 10 mg tablet Take 1 tablet (10 mg total) by mouth once daily. 30 tablet 0    pravastatin (PRAVACHOL) 40 MG tablet        No current facility-administered medications on file prior to visit.       Vital signs:  Ht 5' 3" (1.6 m)   Wt 84.8 kg (186 lb 15.2 oz)   LMP  (LMP Unknown)   BMI 33.12 kg/m²     Physical Exam  Vitals reviewed.   Constitutional:       General: She is not in acute distress.  HENT:      Head: Normocephalic and atraumatic.   Eyes:      General: No scleral icterus.     Conjunctiva/sclera: Conjunctivae normal.   Skin:     General: Skin is warm.      Coloration: Skin is not pale.      Findings: No rash.   Neurological:      Mental Status: She is oriented to person, place, and time.      Gait: Gait normal.   Psychiatric:         Mood and Affect: Mood normal.         Behavior: Behavior normal.         I have reviewed prior labs, imaging, notes from last month    Assessment:  1. NSAID long-term use    2. Disease of intestine, unspecified    3. Crohn's disease of small intestine without complication      After repeat asking, she does now admit to taking advil , at least 4 -6 times a week if not more  She was taking heavy ibuprofen until about 5 months ago, and then stopped but still takign the advil as well as ASA baby    Plan:  Orders Placed This Encounter    CT Enterography Abd_Pelvis With Contrast     I have reviewed the pathology with our GI " pathologist  She has hx pericarditis, has SI joint arthritis, has elevated plt and elevated CRP  She has NO GI Symtpoms at all. NONE    I still have concerns for crohns disease, however I think we need to completely take her off ALL NSAIDs and then reasses the colon in 3- 6 months with repeat colonoscopy  We have calprotectin pending    I will consider getting a CTEntero in near future    We did discuss possible referral to IBD specialist, but for now I want to remove ALL NSAIDS and see her back in 3 months to discuss planning colonoscopy    Needs to see if she can stop baby ASA as well    RTC 3 months to discuss colonoscopy repeat , again must be off all nsaids    Vishal Mariee MD  [facility] [place]    A total of 64minutes were spent during this encounter including reviewing records, interviewing, examining patient, and counseling / coordination of care.

## 2022-08-03 NOTE — PATIENT INSTRUCTIONS
Acetaminophen and tylenol is the only thing you can take    Ask you doctor if you can stop the aspirin.

## 2022-08-08 ENCOUNTER — PATIENT MESSAGE (OUTPATIENT)
Dept: GASTROENTEROLOGY | Facility: CLINIC | Age: 52
End: 2022-08-08
Payer: COMMERCIAL

## 2022-08-08 LAB — CALPROTECTIN STL-MCNT: 389.8 MCG/G

## 2022-08-11 NOTE — TELEPHONE ENCOUNTER
Endoscopy Scheduling Questionnaire:         1. Are you taking any blood thinners? n               If Yes  Have you been on them for longer than one year?     2. Have you been diagnosed with Diverticulitis in past three months?  n    3. Are you on dialysis or have Kidney Disease? n    4. Previous Colonoscopy?  y         If yes Do you have a history of colon polyps?  n      6. Are you a diabetic?  y    7. Do you have a history of constipation?  n      Procedure scheduled with Dr. He  on  05/11/2022    The prep being used is Suprep    The patient's prep instructions were sent by [employer]                      Opioid Pregnancy And Lactation Text: These medications can lead to premature delivery and should be avoided during pregnancy. These medications are also present in breast milk in small amounts.

## 2022-08-19 ENCOUNTER — TELEPHONE (OUTPATIENT)
Dept: OPTOMETRY | Facility: CLINIC | Age: 52
End: 2022-08-19
Payer: COMMERCIAL

## 2022-08-19 NOTE — TELEPHONE ENCOUNTER
----- Message from Lizeth Izaguirre sent at 8/19/2022 10:30 AM CDT -----  Regarding: Appt Request  Contact: Giovanna Medina  Patient is calling to schedule an appt. Patient is wanting to be seen in the [facility] location. Patient seen Dr. Chakraborty a few years back but wanted to see a new provider. Patient is needing to come in for Glaucoma. Giovanna Medina call back number is [phone].

## 2022-08-23 ENCOUNTER — OFFICE VISIT (OUTPATIENT)
Dept: OPHTHALMOLOGY | Facility: CLINIC | Age: 52
End: 2022-08-23
Payer: COMMERCIAL

## 2022-08-23 DIAGNOSIS — H20.9 UVEITIS OF LEFT EYE: ICD-10-CM

## 2022-08-23 DIAGNOSIS — H40.1134 PRIMARY OPEN ANGLE GLAUCOMA (POAG) OF BOTH EYES, INDETERMINATE STAGE: Primary | ICD-10-CM

## 2022-08-23 DIAGNOSIS — H53.40 VISUAL FIELD DEFECT: ICD-10-CM

## 2022-08-23 PROCEDURE — 1160F PR REVIEW ALL MEDS BY PRESCRIBER/CLIN PHARMACIST DOCUMENTED: ICD-10-PCS | Mod: HCWC,CPTII,S$GLB, | Performed by: STUDENT IN AN ORGANIZED HEALTH CARE EDUCATION/TRAINING PROGRAM

## 2022-08-23 PROCEDURE — 99204 OFFICE O/P NEW MOD 45 MIN: CPT | Mod: HCWC,S$GLB,, | Performed by: STUDENT IN AN ORGANIZED HEALTH CARE EDUCATION/TRAINING PROGRAM

## 2022-08-23 PROCEDURE — 92020 PR SPECIAL EYE EVAL,GONIOSCOPY: ICD-10-PCS | Mod: HCWC,S$GLB,, | Performed by: STUDENT IN AN ORGANIZED HEALTH CARE EDUCATION/TRAINING PROGRAM

## 2022-08-23 PROCEDURE — 99999 PR PBB SHADOW E&M-EST. PATIENT-LVL III: CPT | Mod: PBBFAC,HCWC,, | Performed by: STUDENT IN AN ORGANIZED HEALTH CARE EDUCATION/TRAINING PROGRAM

## 2022-08-23 PROCEDURE — 99499 RISK ADDL DX/OHS AUDIT: ICD-10-PCS | Mod: S$GLB,,, | Performed by: STUDENT IN AN ORGANIZED HEALTH CARE EDUCATION/TRAINING PROGRAM

## 2022-08-23 PROCEDURE — 1159F MED LIST DOCD IN RCRD: CPT | Mod: HCWC,CPTII,S$GLB, | Performed by: STUDENT IN AN ORGANIZED HEALTH CARE EDUCATION/TRAINING PROGRAM

## 2022-08-23 PROCEDURE — 1160F RVW MEDS BY RX/DR IN RCRD: CPT | Mod: HCWC,CPTII,S$GLB, | Performed by: STUDENT IN AN ORGANIZED HEALTH CARE EDUCATION/TRAINING PROGRAM

## 2022-08-23 PROCEDURE — 76514 PR  US, EYE, FOR CORNEAL THICKNESS: ICD-10-PCS | Mod: HCWC,S$GLB,, | Performed by: STUDENT IN AN ORGANIZED HEALTH CARE EDUCATION/TRAINING PROGRAM

## 2022-08-23 PROCEDURE — 99499 UNLISTED E&M SERVICE: CPT | Mod: S$GLB,,, | Performed by: STUDENT IN AN ORGANIZED HEALTH CARE EDUCATION/TRAINING PROGRAM

## 2022-08-23 PROCEDURE — 1159F PR MEDICATION LIST DOCUMENTED IN MEDICAL RECORD: ICD-10-PCS | Mod: HCWC,CPTII,S$GLB, | Performed by: STUDENT IN AN ORGANIZED HEALTH CARE EDUCATION/TRAINING PROGRAM

## 2022-08-23 PROCEDURE — 76514 ECHO EXAM OF EYE THICKNESS: CPT | Mod: HCWC,S$GLB,, | Performed by: STUDENT IN AN ORGANIZED HEALTH CARE EDUCATION/TRAINING PROGRAM

## 2022-08-23 PROCEDURE — 99999 PR PBB SHADOW E&M-EST. PATIENT-LVL III: ICD-10-PCS | Mod: PBBFAC,HCWC,, | Performed by: STUDENT IN AN ORGANIZED HEALTH CARE EDUCATION/TRAINING PROGRAM

## 2022-08-23 PROCEDURE — 99204 PR OFFICE/OUTPT VISIT, NEW, LEVL IV, 45-59 MIN: ICD-10-PCS | Mod: HCWC,S$GLB,, | Performed by: STUDENT IN AN ORGANIZED HEALTH CARE EDUCATION/TRAINING PROGRAM

## 2022-08-23 PROCEDURE — 92020 GONIOSCOPY: CPT | Mod: HCWC,S$GLB,, | Performed by: STUDENT IN AN ORGANIZED HEALTH CARE EDUCATION/TRAINING PROGRAM

## 2022-08-23 RX ORDER — BRIMONIDINE TARTRATE, TIMOLOL MALEATE 2; 5 MG/ML; MG/ML
1 SOLUTION/ DROPS OPHTHALMIC 2 TIMES DAILY
Qty: 45 ML | Refills: 3 | Status: SHIPPED | OUTPATIENT
Start: 2022-08-23 | End: 2023-12-14

## 2022-08-23 RX ORDER — ATROPINE SULFATE 10 MG/ML
1 SOLUTION/ DROPS OPHTHALMIC DAILY PRN
Qty: 5 ML | Refills: 3 | Status: SHIPPED | OUTPATIENT
Start: 2022-08-23

## 2022-08-23 NOTE — PROGRESS NOTES
"Subjective:       Patient ID: Giovanna Medina is a 52 y.o. female.    Chief Complaint: Glaucoma    HPI     Pt is here today for a Glaucoma Evaluation.  She was previously followed by Dr. Chakraborty but switched to Dr. Armstrong   starting 2017- present. Has been happy with her care with Dr. Armstrong, but   insurance changed and therefore need to change eye MD's.    Her left eye has been irritated since this past Friday. The eye is red and   she did have an eyelash stick in there, Dr. Armstrong gave her drops for   flare ups to what he explained to her as Arthritis of the eye. It does not   happen often but she is currently experiencing a flare up. Eye just gets   red and "sore" but no photophobia or tearing. Improves with Tylenol.     Eye Meds  Combigan BID OU    Ketorolac, TID OS for flare ups, drops are  though (for red eye and   sensitivity to light)  Atropine 1% qd OD, has not been using   Cyclopentolate 1% TID OS - only a couple times      Last edited by Ca Potter MD on 2022  3:23 PM. (History)               Assessment & Plan   Primary open angle glaucoma (POAG) of both eyes, indeterminate stage  -     COMBIGAN 0.2-0.5 % Drop; Place 1 drop into both eyes 2 (two) times a day.  Dispense: 45 mL; Refill: 3    Uveitis of left eye  -     atropine 1% (ISOPTO ATROPINE) 1 % Drop; Place 1 drop into both eyes daily as needed (eye pain).  Dispense: 5 mL; Refill: 3    Visual field defect       Previously saw Dr. Chakraborty - last . Has been following with Dr. Armstrong.     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  Dec RNFL throughout ou    Imaging is outdated  Request prior records from Dr. Armstrong  Review next " visit  Need updated HVF and OCT-RNFL - to get next visit     Optic atrophy OU  Need updated imaging    Recurrent Iritis OS longstanding  Pain, redness  Ok for Atropine PRN pain  Call if symptoms no improvement       Low vision OU  [facility]  Vision stable since 2012 - re-referral today       PLAN  Continue Combigan BID OU  Atropine PRN OS pain    RTC 4 months with OCT-RNFL, HVF 24-2    Ca Potter M.D., M.S.  Department of Ophthalmology   Division of Glaucoma Surgery  [facility]

## 2022-08-25 DIAGNOSIS — H20.9 UVEITIS OF LEFT EYE: Primary | ICD-10-CM

## 2022-08-25 RX ORDER — KETOROLAC TROMETHAMINE 5 MG/ML
1 SOLUTION OPHTHALMIC 3 TIMES DAILY
COMMUNITY
End: 2022-08-25 | Stop reason: SDUPTHER

## 2022-08-25 RX ORDER — KETOROLAC TROMETHAMINE 5 MG/ML
1 SOLUTION OPHTHALMIC 3 TIMES DAILY
Qty: 10 ML | Refills: 2 | Status: SHIPPED | OUTPATIENT
Start: 2022-08-25

## 2022-08-25 NOTE — TELEPHONE ENCOUNTER
----- Message from Ca Potter MD sent at 8/25/2022 11:41 AM CDT -----  Regarding: RE: Meds  Contact: Giovanna Medina  Yes - she used it before. I will sign  ----- Message -----  From: Shakira Sepulveda MA  Sent: 8/25/2022   9:52 AM CDT  To: Ca Potter MD  Subject: FW: Meds                                         I read her notes.   Please advise how you would like to handle this/     Thanks     ----- Message -----  From: Lizeth Izaguirre  Sent: 8/25/2022   8:48 AM CDT  To: [facility] Penaloza Staff  Subject: Meds                                             Patient is wanting to know could Dr. Potter call in Ketorolac to the [facility] in [place] [phone] or [phone]. Patient stated her pain will not let up. Giovanna Medina call back number is [phone].

## 2022-09-19 NOTE — PROGRESS NOTES
"TIME RECORD    Date:  01/25/2018    Start Time:  10:20 am   Stop Time:  11:35 am     PROCEDURES:    TIMED  Procedure Time Min.   TE (supervised)  Start:10:20 am   Stop:10:30 am  10 (no charge)    TE Start: 10:30 am   Stop:10:40 am  10   MT Start:10:40 am   Stop:10:50 am  10   TE Start:10:50 am   Stop:11:00 am  10   TE (supervised)  Start:11:00 am   Stop:11:20 am  20 (no charge)         UNTIMED  Procedure Time Min.   MHP Start:11:20 am   Stop:11:35 am  15     Total Timed Minutes:  30  Total Timed Units:  2  Total Untimed Units:  1   Charges Billed/# of units:  1 MT, 1 TE    Progress/Current Status    Subjective:     Patient ID: Giovanna Medina is a 47 y.o. female.  Diagnosis:   1. Decreased ROM of lumbar spine     2. Bilateral leg weakness     3. Postural imbalance     4. Chronic midline low back pain without sciatica       Pain: 4 /10  Patient states I would say my low back is bothering me more than my neck today. My back just feels super tight and restricted without much range of motion. Patient states it is even getting to the point where I am having difficulty wiping my bottom when I go to the bathroom and that is no good.     Objective:     Patient warmed up on stationary bike x 10 minutes followed by patient completed therex per log x 20 total minutes 1:1 with PT an additional 20 minutes with PT tech under direct PT supervision with patient able to tolerate progression of exercises without any complaints of increase in pain requiring pretty constant verbal instruction to promote TA activation with each exercise while maintaining normal respiration. Session ended with moist heat pack to cervical and lumbar spine x 15 minutes in hookline position.     Bike 10 min  SKTC 10x10"  LTR 3x10  LTR with bilateral LEs on theraball 5"x10  PPT 3x10  Supine chin tucks 3x10  Supine serratus punches 3x10  Bridges with theraball 3 x10  Supine B shoulder ER YTB 3x10  Hip ABD/ADD RTB 3x10  Clams 3x10  Rows/scap retract OTB " "3x10  Shuttle w/ UE ext OTB 3x10 1 cord  Seated on theraball TA activation 5"x10  Seated on theraball marching 1x10 B  Seated on theraball LAQs 1x10 B    Assessment:     Patient with some increase in tissue tension at bilateral lumbar PSM which improved with manual therapy however still limited. Patient with poor TA activation with each exercise requiring constant verbal instruction to maintain proper form while maintaining normal respiration with overall continued decreased strength through abdominal musculature. Patient without any complaints of increase in pain throughout all exercises.     Patient Education/Response:     Patient educated to continue to complete HEP on days not attending therapy with patient demonstrating understanding.    Plans and Goals:     Continue as renetta, progress with core stabilization as able.     Goals as follows:  Short Term GOALS: 3 weeks. Pt agrees with goals set.  1. Patient demonstrates independence with HEP.   2. Patient demonstrates independence with Postural Awareness.   3. Patient demonstrates independence with body mechanics.      Long Term GOALS: 6 weeks. Pt agrees with goals set.  1. Patient demonstrates increased L/S ROM by 25% or better to improve tolerance to functional activities pain free.   2. Patient demonstrates increased strength BLE's to 4+/5 or greater to improve tolerance to functional activities pain free.   3. Patient demonstrates improved overall function per FOTO Lumbar Survey to % Limitation or less.     " none

## 2022-10-03 ENCOUNTER — OFFICE VISIT (OUTPATIENT)
Dept: GASTROENTEROLOGY | Facility: CLINIC | Age: 52
End: 2022-10-03
Payer: COMMERCIAL

## 2022-10-03 DIAGNOSIS — K50.00 CROHN'S DISEASE OF SMALL INTESTINE WITHOUT COMPLICATION: ICD-10-CM

## 2022-10-03 DIAGNOSIS — Z79.1 NSAID LONG-TERM USE: ICD-10-CM

## 2022-10-03 DIAGNOSIS — K50.00 TERMINAL ILEITIS WITHOUT COMPLICATION: Primary | ICD-10-CM

## 2022-10-03 PROCEDURE — 99214 PR OFFICE/OUTPT VISIT, EST, LEVL IV, 30-39 MIN: ICD-10-PCS | Mod: 95,HCWC,, | Performed by: INTERNAL MEDICINE

## 2022-10-03 PROCEDURE — 99214 OFFICE O/P EST MOD 30 MIN: CPT | Mod: 95,HCWC,, | Performed by: INTERNAL MEDICINE

## 2022-10-03 NOTE — PROGRESS NOTES
The patient location is: home  The chief complaint leading to consultation is: followup    Visit type: audiovisual    Face to Face time with patient: 13 min  22 minutes of total time spent on the encounter, which includes face to face time and non-face to face time preparing to see the patient (eg, review of tests), Obtaining and/or reviewing separately obtained history, Documenting clinical information in the electronic or other health record, Independently interpreting results (not separately reported) and communicating results to the patient/family/caregiver, or Care coordination (not separately reported).         Each patient to whom he or she provides medical services by telemedicine is:  (1) informed of the relationship between the physician and patient and the respective role of any other health care provider with respect to management of the patient; and (2) notified that he or she may decline to receive medical services by telemedicine and may withdraw from such care at any time.             GASTROENTEROLOGY CLINIC NOTE    Reason for visit: The primary encounter diagnosis was Terminal ileitis without complication. Diagnoses of Crohn's disease of small intestine without complication and NSAID long-term use were also pertinent to this visit.  Referring provider/PCP: Carmina Cordova MD    HPI:  Giovanna Medina is a 52 y.o. female here today for ileitis / colitis, concern for possible crohns.    Interval  Here for follow up  Doing great. No abd symptoms. No change bowels, no diarrhea, no cramps, no bloat, no vomit, no gurgling, no blood stool  She does confirm she was taking lots of nsaids, has now STOPPED all nsaids and even stopped asa after discussion with primary team.      =======================  Initial clinic visit 8/2022  She has no abd symptoms, normal bowel habits, 1 -2 times a day, formed. No wt loss. No vomiting. No other symptoms.  Does have hx of pericarditis. She was told 'maybe bacterial'  Was  told at age 23 she has 'SI arthritis'  She had colonoscopy maybe 15 years ago, she thinks it was normal    She is taking advil dual action. 4 -6 pills / week maybe. She isnt sure.     Takes baby ASA daily.   Colchicine and 400mg ibuprofen - stopped in February (was on about 2 months in total)  After REPETITIVE ASKING, THEN SHE ADMITS TO TAKING ADVIL MAYBE 4 -6 TABLET PER WEEK STILL     Family hx - 2 brothers with leukemia , worked at chemical plant.   No family hx of crohns disease.    Prior Endoscopy:  EGD:  Colon:  7/2022   Impression:            - Multiple ulcers in the terminal ileum. Biopsied.                          - Congested, eroded, inflamed, scarred and                          ulcerated mucosa in the proximal ascending colon,                          in the cecum and at the ileocecal valve. Biopsied.                          - The descending colon, splenic flexure,                          transverse colon and hepatic flexure are normal.                          Biopsied.                          - Erythematous mucosa in the rectum, in the                          recto-sigmoid colon and in the distal sigmoid                          colon. Biopsied.                          - Diverticulosis in the sigmoid colon.                          - The examination was otherwise normal on direct                          and retroflexion views.                          Exam done for screening, significant for TI and                          cecal ulcerations typical for Crohns disease, with                          some mild irritation in rectum and distal sigmoid.                          Otherwise normal.   Recommendation:        - Discharge patient to home.                          - Patient has a contact number available for                          emergencies. The signs and symptoms of potential                          delayed complications were discussed with the                          patient. Return to  normal activities tomorrow.                          Written discharge instructions were provided to                          the patient.                          - Resume previous diet.                          - Continue present medications.                          - Await pathology results.                          - Repeat colonoscopy after studies are complete to                          check healing.                          - Return to my office in 2 weeks. for workup of                          crohns disease.     PATH  With GI pathologist  Supplemental report for second opinion GI pathology review.   1. Ileum (biopsy):   - Ulcer and active ileitis   - Patchy mild chronic changes   - No granulomas   - No viral inclusions   - No dysplasia   2. Colon, cecum (biopsy):   - Ulcer and chronic active colitis   - Variable severity throughout the biopsy   - No granulomas   - No viral inclusions   3. Colon, transfers (biopsies):   - Histologically normal colonic mucosa   - Negative for active inflammation   - No granulomas   - Negative for dysplasia   4. Colon, distal sigmoid (biopsies):   - Histologically normal colonic mucosa   - Negative for active inflammation   - No granulomas   - Negative for dysplasia   5. Colon, rectum (biopsy):   - Colonic mucosa with prominent lymphoid aggregates and chronic colitis   including architectural distortion   - Negative for significant active inflammation   - No granulomas, no viral inclusions   ______________________________________________________________________________   ______   Comments:  Findings in the above set of biopsy show ulcer with patchy and   variable chronic active inflammation.  Inflammatory bowel disease is a strong   consideration.  Additional etiologies based on histology alone may include   other forms of ulcer and chronic active inflammation such as ulcer from   medication and ischemia.  Correlation with endoscopic findings and symptom   course is  recommended.     (Portions of this note were dictated using voice recognition software and may contain dictation related errors in spelling/grammar/syntax not found on text review)    Review of Systems   Constitutional:  Negative for fever and malaise/fatigue.   Respiratory:  Negative for cough and shortness of breath.    Cardiovascular:  Negative for chest pain and palpitations.   Gastrointestinal:  Negative for abdominal pain, blood in stool, nausea and vomiting.   Neurological:  Negative for dizziness and headaches.     Past Medical History: has a past medical history of Benign paroxysmal vertigo, bilateral, Diabetes mellitus, type 2, Glaucoma suspect of both eyes, History of pericarditis, Hypercholesterolemia, and Optic atrophy, unspecified.    Past Surgical History: has a past surgical history that includes  section, classic; Brain surgery (2005); Tubal ligation; and Colonoscopy (N/A, 2022).    Family History:family history includes Breast cancer in her maternal grandmother; Cataracts in her mother; Diabetes in her brother and mother; Glaucoma in her mother; Heart disease in her mother; No Known Problems in her father; Stroke in her mother.    Allergies:   Review of patient's allergies indicates:   Allergen Reactions    Codeine Anaphylaxis       Social History: reports that she has never smoked. She has never used smokeless tobacco. She reports that she does not drink alcohol and does not use drugs.    Home medications:   Current Outpatient Medications on File Prior to Visit   Medication Sig Dispense Refill    aspirin 81 MG Chew Take 81 mg by mouth once daily.      atropine 1% (ISOPTO ATROPINE) 1 % Drop Place 1 drop into both eyes daily as needed (eye pain). 5 mL 3    BD ALCOHOL SWABS PadM       COMBIGAN 0.2-0.5 % Drop Place 1 drop into both eyes 2 (two) times a day. 45 mL 3    ketorolac 0.5% (ACULAR) 0.5 % Drop Place 1 drop into the left eye 3 (three) times daily. 10 mL 2    loratadine  (CLARITIN) 10 mg tablet Take 1 tablet (10 mg total) by mouth once daily. 30 tablet 0    meclizine (ANTIVERT) 12.5 mg tablet Take 1 tablet by mouth daily as needed.      metFORMIN (GLUCOPHAGE) 500 MG tablet       pravastatin (PRAVACHOL) 40 MG tablet       TRUE METRIX GLUCOSE TEST STRIP Strp        No current facility-administered medications on file prior to visit.       Vital signs:  LMP  (LMP Unknown)     Physical Exam  Vitals reviewed.   Constitutional:       Appearance: She is not toxic-appearing.       I have reviewed prior labs, imaging, notes from last month    Assessment:  1. Terminal ileitis without complication    2. Crohn's disease of small intestine without complication    3. NSAID long-term use      We discusssed her pathology.  She still has NO GI symptoms.  However, given the elevated crp, elevated calpro, and the pathology that seems to favor crohns (reviewed personally with our gi pathologist), My suspicion is that this is Crohns but she is asymptomatic, or possibly has mild symptoms and just become 'used' to these.  She is not very willing to committ to therapy without any symptoms and I can understand that.      Plan:  Orders Placed This Encounter    Case Request Endoscopy: COLONOSCOPY     We discussed the options of :  Watch and wait, given absolutely no symptoms.  OR start crohns therapy and see how she does, understanding she is less likely to committ to therapy without any symptoms  OR repeat colonoscopy about being OFF nsaids for about 3-6 months    She wants to repeat colonoscopy and we will do that near end of year. This will be done OFF Nsaids for at least 4-6 months    She is OFF all NSAIDS, including baby ASA. Taking tylenol only.    RTC after above colonoscopy    Vishal Mariee MD  [facility]    A total of 64minutes were spent during this encounter including reviewing records, interviewing, examining patient, and counseling / coordination of care.

## 2022-10-04 ENCOUNTER — PATIENT MESSAGE (OUTPATIENT)
Dept: GASTROENTEROLOGY | Facility: CLINIC | Age: 52
End: 2022-10-04
Payer: COMMERCIAL

## 2022-10-04 ENCOUNTER — TELEPHONE (OUTPATIENT)
Dept: GASTROENTEROLOGY | Facility: CLINIC | Age: 52
End: 2022-10-04
Payer: COMMERCIAL

## 2022-10-04 RX ORDER — SODIUM, POTASSIUM,MAG SULFATES 17.5-3.13G
1 SOLUTION, RECONSTITUTED, ORAL ORAL DAILY
Qty: 1 KIT | Refills: 0 | Status: SHIPPED | OUTPATIENT
Start: 2022-10-04 | End: 2022-10-06

## 2022-10-04 NOTE — TELEPHONE ENCOUNTER
----- Message from Vishal Mariee MD sent at 10/3/2022 10:11 PM CDT -----  Ordered colonoscopy, no sooner than December , please help schedule

## 2022-10-17 ENCOUNTER — OFFICE VISIT (OUTPATIENT)
Dept: ORTHOPEDICS | Facility: CLINIC | Age: 52
End: 2022-10-17
Payer: COMMERCIAL

## 2022-10-17 ENCOUNTER — LAB VISIT (OUTPATIENT)
Dept: LAB | Facility: HOSPITAL | Age: 52
End: 2022-10-17
Attending: NURSE PRACTITIONER
Payer: COMMERCIAL

## 2022-10-17 VITALS — BODY MASS INDEX: 33.49 KG/M2 | HEIGHT: 63 IN | WEIGHT: 189 LBS

## 2022-10-17 DIAGNOSIS — M65.4 DE QUERVAIN'S TENOSYNOVITIS, RIGHT: Primary | ICD-10-CM

## 2022-10-17 DIAGNOSIS — D75.839 THROMBOCYTOSIS: ICD-10-CM

## 2022-10-17 LAB
BASOPHILS # BLD AUTO: 0.06 K/UL (ref 0–0.2)
BASOPHILS NFR BLD: 0.8 % (ref 0–1.9)
CRP SERPL-MCNC: 23.4 MG/L (ref 0–8.2)
DIFFERENTIAL METHOD: ABNORMAL
EOSINOPHIL # BLD AUTO: 0.2 K/UL (ref 0–0.5)
EOSINOPHIL NFR BLD: 3.2 % (ref 0–8)
ERYTHROCYTE [DISTWIDTH] IN BLOOD BY AUTOMATED COUNT: 14 % (ref 11.5–14.5)
ERYTHROCYTE [SEDIMENTATION RATE] IN BLOOD BY WESTERGREN METHOD: 63 MM/HR (ref 0–20)
FERRITIN SERPL-MCNC: 83 NG/ML (ref 20–300)
HCT VFR BLD AUTO: 40.3 % (ref 37–48.5)
HGB BLD-MCNC: 12.7 G/DL (ref 12–16)
IMM GRANULOCYTES # BLD AUTO: 0.02 K/UL (ref 0–0.04)
IMM GRANULOCYTES NFR BLD AUTO: 0.3 % (ref 0–0.5)
IRON SERPL-MCNC: 59 UG/DL (ref 30–160)
LYMPHOCYTES # BLD AUTO: 2.3 K/UL (ref 1–4.8)
LYMPHOCYTES NFR BLD: 32.3 % (ref 18–48)
MCH RBC QN AUTO: 26.1 PG (ref 27–31)
MCHC RBC AUTO-ENTMCNC: 31.5 G/DL (ref 32–36)
MCV RBC AUTO: 83 FL (ref 82–98)
MONOCYTES # BLD AUTO: 0.5 K/UL (ref 0.3–1)
MONOCYTES NFR BLD: 6.6 % (ref 4–15)
NEUTROPHILS # BLD AUTO: 4 K/UL (ref 1.8–7.7)
NEUTROPHILS NFR BLD: 56.8 % (ref 38–73)
NRBC BLD-RTO: 0 /100 WBC
PLATELET # BLD AUTO: 428 K/UL (ref 150–450)
PMV BLD AUTO: 8.9 FL (ref 9.2–12.9)
RBC # BLD AUTO: 4.87 M/UL (ref 4–5.4)
SATURATED IRON: 17 % (ref 20–50)
TOTAL IRON BINDING CAPACITY: 354 UG/DL (ref 250–450)
TRANSFERRIN SERPL-MCNC: 239 MG/DL (ref 200–375)
URATE SERPL-MCNC: 7.8 MG/DL (ref 2.4–5.7)
WBC # BLD AUTO: 7.1 K/UL (ref 3.9–12.7)

## 2022-10-17 PROCEDURE — 99213 PR OFFICE/OUTPT VISIT, EST, LEVL III, 20-29 MIN: ICD-10-PCS | Mod: HCWC,25,S$GLB, | Performed by: ORTHOPAEDIC SURGERY

## 2022-10-17 PROCEDURE — 99999 PR PBB SHADOW E&M-EST. PATIENT-LVL III: CPT | Mod: PBBFAC,HCWC,, | Performed by: ORTHOPAEDIC SURGERY

## 2022-10-17 PROCEDURE — 85652 RBC SED RATE AUTOMATED: CPT | Mod: HCWC | Performed by: NURSE PRACTITIONER

## 2022-10-17 PROCEDURE — 86140 C-REACTIVE PROTEIN: CPT | Mod: HCWC | Performed by: NURSE PRACTITIONER

## 2022-10-17 PROCEDURE — 99999 PR PBB SHADOW E&M-EST. PATIENT-LVL III: ICD-10-PCS | Mod: PBBFAC,HCWC,, | Performed by: ORTHOPAEDIC SURGERY

## 2022-10-17 PROCEDURE — 85025 COMPLETE CBC W/AUTO DIFF WBC: CPT | Mod: HCWC | Performed by: NURSE PRACTITIONER

## 2022-10-17 PROCEDURE — 36415 COLL VENOUS BLD VENIPUNCTURE: CPT | Mod: HCWC | Performed by: NURSE PRACTITIONER

## 2022-10-17 PROCEDURE — 20550 PR INJECT TENDON SHEATH/LIGAMENT: ICD-10-PCS | Mod: HCWC,RT,S$GLB, | Performed by: ORTHOPAEDIC SURGERY

## 2022-10-17 PROCEDURE — 84466 ASSAY OF TRANSFERRIN: CPT | Mod: HCWC | Performed by: NURSE PRACTITIONER

## 2022-10-17 PROCEDURE — 20550 NJX 1 TENDON SHEATH/LIGAMENT: CPT | Mod: HCWC,RT,S$GLB, | Performed by: ORTHOPAEDIC SURGERY

## 2022-10-17 PROCEDURE — 82728 ASSAY OF FERRITIN: CPT | Mod: HCWC | Performed by: NURSE PRACTITIONER

## 2022-10-17 PROCEDURE — 84550 ASSAY OF BLOOD/URIC ACID: CPT | Mod: HCWC | Performed by: NURSE PRACTITIONER

## 2022-10-17 PROCEDURE — 99213 OFFICE O/P EST LOW 20 MIN: CPT | Mod: HCWC,25,S$GLB, | Performed by: ORTHOPAEDIC SURGERY

## 2022-10-17 PROCEDURE — 1159F MED LIST DOCD IN RCRD: CPT | Mod: HCWC,CPTII,S$GLB, | Performed by: ORTHOPAEDIC SURGERY

## 2022-10-17 PROCEDURE — 1159F PR MEDICATION LIST DOCUMENTED IN MEDICAL RECORD: ICD-10-PCS | Mod: HCWC,CPTII,S$GLB, | Performed by: ORTHOPAEDIC SURGERY

## 2022-10-17 RX ORDER — DICLOFENAC SODIUM 10 MG/G
2 GEL TOPICAL 3 TIMES DAILY
Qty: 100 G | Refills: 2 | Status: SHIPPED | OUTPATIENT
Start: 2022-10-17

## 2022-10-17 RX ORDER — TRIAMCINOLONE ACETONIDE 40 MG/ML
20 INJECTION, SUSPENSION INTRA-ARTICULAR; INTRAMUSCULAR
Status: COMPLETED | OUTPATIENT
Start: 2022-10-17 | End: 2022-10-17

## 2022-10-17 RX ADMIN — TRIAMCINOLONE ACETONIDE 20 MG: 40 INJECTION, SUSPENSION INTRA-ARTICULAR; INTRAMUSCULAR at 09:10

## 2022-10-17 NOTE — PROGRESS NOTES
Subjective:      Patient ID: Giovanna Medina is a 52 y.o. female.  Chief Complaint: Follow-up (R Wrist )      HPI  Giovanna Medina is a  52 y.o. female presenting today for follow up of de Quervain tendinitis right wrist.  She reports that she is having some ongoing symptoms  She has had several injections she does wear the wrist brace   No numbness or tingling reported.    Review of patient's allergies indicates:   Allergen Reactions    Codeine Anaphylaxis         Current Outpatient Medications   Medication Sig Dispense Refill    BD ALCOHOL SWABS PadM       COMBIGAN 0.2-0.5 % Drop Place 1 drop into both eyes 2 (two) times a day. 45 mL 3    meclizine (ANTIVERT) 12.5 mg tablet Take 1 tablet by mouth daily as needed.      metFORMIN (GLUCOPHAGE) 500 MG tablet       TRUE METRIX GLUCOSE TEST STRIP Strp       aspirin 81 MG Chew Take 81 mg by mouth once daily.      atropine 1% (ISOPTO ATROPINE) 1 % Drop Place 1 drop into both eyes daily as needed (eye pain). (Patient not taking: Reported on 10/17/2022) 5 mL 3    ketorolac 0.5% (ACULAR) 0.5 % Drop Place 1 drop into the left eye 3 (three) times daily. (Patient not taking: Reported on 10/17/2022) 10 mL 2    loratadine (CLARITIN) 10 mg tablet Take 1 tablet (10 mg total) by mouth once daily. 30 tablet 0    pravastatin (PRAVACHOL) 40 MG tablet        No current facility-administered medications for this visit.       Past Medical History:   Diagnosis Date    Benign paroxysmal vertigo, bilateral     Diabetes mellitus, type 2     Glaucoma suspect of both eyes     History of pericarditis 2021    Hypercholesterolemia     Optic atrophy, unspecified        Past Surgical History:   Procedure Laterality Date    BRAIN SURGERY  2005     SECTION, CLASSIC      COLONOSCOPY N/A 2022    Procedure: COLONOSCOPY Suprep;  Surgeon: Vishal Mariee MD;  Location: [facility];  Service: Endoscopy;  Laterality: N/A;    TUBAL LIGATION         OBJECTIVE:   PHYSICAL EXAM:  Height: 5'  "3" (160 cm) Weight: 85.7 kg (189 lb)  Vitals:    10/17/22 0921   Weight: 85.7 kg (189 lb)   Height: 5' 3" (1.6 m)   PainSc:   4     Ortho/SPM Exam  Examination right hand wrist right wrist a little bit tender over the 1st dorsal compartment slight swelling range of motion full mildly positive Finkelstein test   Tinel sign negative       RADIOGRAPHS:  None  Comments: I have personally reviewed the imaging and I agree with the above radiologist's report.    ASSESSMENT/PLAN:     IMPRESSION:  De Quervain tendinitis right wrist    PLAN:  Although she has improved with the injections he is not completely pain-free so we talked about surgery as an option   She would like to hold off at least the end of this year might consider after the holidays  She would like injection today   After pause for time-out identified the right wrist injected 1st dorsal compartment with combination Kenalog 20 mg 0.5 cc xylocaine sterile technique  Tolerated the procedure well without complication   Continue wrist splint part-time   Voltaren gel topical    FOLLOW UP:  6-8 weeks    Disclaimer: This note has been generated using voice-recognition software. There may be typographical errors that have been missed during proof-reading.     "

## 2022-10-25 ENCOUNTER — OFFICE VISIT (OUTPATIENT)
Dept: HEMATOLOGY/ONCOLOGY | Facility: CLINIC | Age: 52
End: 2022-10-25
Payer: COMMERCIAL

## 2022-10-25 ENCOUNTER — TELEPHONE (OUTPATIENT)
Dept: HEMATOLOGY/ONCOLOGY | Facility: CLINIC | Age: 52
End: 2022-10-25
Payer: COMMERCIAL

## 2022-10-25 VITALS
BODY MASS INDEX: 33.43 KG/M2 | OXYGEN SATURATION: 100 % | DIASTOLIC BLOOD PRESSURE: 67 MMHG | HEART RATE: 102 BPM | HEIGHT: 63 IN | SYSTOLIC BLOOD PRESSURE: 140 MMHG | WEIGHT: 188.69 LBS

## 2022-10-25 DIAGNOSIS — E78.00 ELEVATED CHOLESTEROL: ICD-10-CM

## 2022-10-25 DIAGNOSIS — Z80.9 FAMILY HISTORY OF CANCER: ICD-10-CM

## 2022-10-25 DIAGNOSIS — I30.1 ACUTE VIRAL PERICARDITIS: ICD-10-CM

## 2022-10-25 DIAGNOSIS — E79.0 HYPERURICEMIA: ICD-10-CM

## 2022-10-25 DIAGNOSIS — D75.839 THROMBOCYTOSIS: Primary | ICD-10-CM

## 2022-10-25 DIAGNOSIS — E11.9 TYPE 2 DIABETES MELLITUS WITHOUT COMPLICATION, WITHOUT LONG-TERM CURRENT USE OF INSULIN: ICD-10-CM

## 2022-10-25 PROCEDURE — 99499 UNLISTED E&M SERVICE: CPT | Mod: S$GLB,,, | Performed by: NURSE PRACTITIONER

## 2022-10-25 PROCEDURE — 99999 PR PBB SHADOW E&M-EST. PATIENT-LVL IV: CPT | Mod: PBBFAC,HCWC,, | Performed by: NURSE PRACTITIONER

## 2022-10-25 PROCEDURE — 3077F PR MOST RECENT SYSTOLIC BLOOD PRESSURE >= 140 MM HG: ICD-10-PCS | Mod: HCWC,CPTII,S$GLB, | Performed by: NURSE PRACTITIONER

## 2022-10-25 PROCEDURE — 99215 PR OFFICE/OUTPT VISIT, EST, LEVL V, 40-54 MIN: ICD-10-PCS | Mod: HCWC,S$GLB,, | Performed by: NURSE PRACTITIONER

## 2022-10-25 PROCEDURE — 3078F PR MOST RECENT DIASTOLIC BLOOD PRESSURE < 80 MM HG: ICD-10-PCS | Mod: HCWC,CPTII,S$GLB, | Performed by: NURSE PRACTITIONER

## 2022-10-25 PROCEDURE — 3078F DIAST BP <80 MM HG: CPT | Mod: HCWC,CPTII,S$GLB, | Performed by: NURSE PRACTITIONER

## 2022-10-25 PROCEDURE — 99999 PR PBB SHADOW E&M-EST. PATIENT-LVL IV: ICD-10-PCS | Mod: PBBFAC,HCWC,, | Performed by: NURSE PRACTITIONER

## 2022-10-25 PROCEDURE — 99215 OFFICE O/P EST HI 40 MIN: CPT | Mod: HCWC,S$GLB,, | Performed by: NURSE PRACTITIONER

## 2022-10-25 PROCEDURE — 3077F SYST BP >= 140 MM HG: CPT | Mod: HCWC,CPTII,S$GLB, | Performed by: NURSE PRACTITIONER

## 2022-10-25 PROCEDURE — 1159F PR MEDICATION LIST DOCUMENTED IN MEDICAL RECORD: ICD-10-PCS | Mod: HCWC,CPTII,S$GLB, | Performed by: NURSE PRACTITIONER

## 2022-10-25 PROCEDURE — 1159F MED LIST DOCD IN RCRD: CPT | Mod: HCWC,CPTII,S$GLB, | Performed by: NURSE PRACTITIONER

## 2022-10-25 PROCEDURE — 99499 RISK ADDL DX/OHS AUDIT: ICD-10-PCS | Mod: S$GLB,,, | Performed by: NURSE PRACTITIONER

## 2022-10-25 NOTE — TELEPHONE ENCOUNTER
Spoke with pt and confirmed genetics appt.      ----- Message from Shi Hester MA sent at 10/25/2022 11:41 AM CDT -----  Good morning, this patient need to be scheduled with [facility] at the main campus per Juanita Seals NP      Thanks

## 2022-10-25 NOTE — PROGRESS NOTES
"Patient ID: Giovanna Medina is a 52 y.o. female.    Chief Complaint: Thrombocytosis      History     HPI    Giovanna Medina is a 52yr old female referred to hematology for evaluation of thrombocytosis by personal referral. Daughter being seen for ALMA by Dr CHRISTIANO Macdonald in this clinic and pt wanted to be seen by same group. Comorbidities include diabetes, sees optho for concern of glaucoma, sacroiliac arthritis (diagnosis age 21yrs), vertigo, hypercholesterolemia, has history of viral induced pericarditis in 2021 and has followed pulmonology and cardiology for this.  Pt states A1c improved to 5.9 from 7.9 with intermittent metformin usage, weight and diet management which she is continuing to work on. She is currently not on pravastatin for cholesterol stating her cardiologist is giving her a chance as well with weight and diet management. Currently has completed follow up with pulmonology, on  Yearly visits with cardiology at [facility]. Pt did complete a screening colonoscopy today 22 which per pt report she has "small ulcers in colon, they said they don't think its cancer but sending specimens to check". Will follow up on this. Pt was on colchicine and TID ibuprofen for  Pericarditis, discontinued 2022 after DUB. She is on a daily baby asa since pericarditis diagnosis. Pericarditis etiology was suspected as viral illness exposure with grandchildren. Covid diagnosis 2021.  Concern for nodule in long on CXR 3/2022 was clarified as old consolidation, improved, on recommended CT chest with cardiology and pulmonology chart review as well as [facility] [place] [place] office visits. Pericarditis was noted as resolved, EF noted as normal.     Family history includes brother  with history of leukemia but death related to medication response/cardiac arrest; one brother had a nasal cancer with surgical/radiation/chemo management and is currently living; another brother "  due to leukemia; another brother  after a type of abdominal cancer,  mother  with CHF complications and aneurysm to heart age 60y, father  due to trauma complications in his mid 30s. Pt's daughter has ALMA.    Pt denies fatigue, flushing, itching, chest pain, sob/carter, leg swelling, n/v/d, abdominal pain, abdominal swelling, hemoptysis, hematemesis, melena, epistaxis, easy bleeding. She has had some intentional weight loss with trying to improve diabetes and cholesterol management.     INTERVAL  -here for thrombocytosis follow up  -history of uterine biopsy negative 2022  -GI colonoscopy 22 with ulcers, negative biopsy, repeat scheduled for 22  -is taking metformin regularly over last 2-3 wks as she noticed blood sugars increasing to 140s/150s  -seeing ortho for chronic right hand/wrist pain with tendonitis she states, also has chronic joint pains particularly with SI arthritis she was diagnosed with age 21yrs of age  -denies chest pain, sob, abdominal pain, n/v/d, weight loss, nightsweats; states she is feeling better than last visit, has been busy with daughter having new grandbaby 2 months ago  -pt with concern for cancer given pmh of family members, 3 brothers with cancer, GM with breast cancer history and would like genetic testing  -she eats shellfish nearly daily, no etoh use      Past medical, surgical, and medication history, past family history reviewed and uptdated with patient today as noted.      Past Medical History:   Diagnosis Date    Benign paroxysmal vertigo, bilateral     Diabetes mellitus, type 2     Glaucoma suspect of both eyes     History of pericarditis 2021    Hypercholesterolemia     Optic atrophy, unspecified        Past Surgical History:   Procedure Laterality Date    BRAIN SURGERY  2005     SECTION, CLASSIC      COLONOSCOPY N/A 2022    Procedure: COLONOSCOPY Suprep;  Surgeon: Vishal Mariee MD;  Location: [facility];   "Service: Endoscopy;  Laterality: N/A;    TUBAL LIGATION         Oncology History:  Oncology History    No history exists.        Review of Systems:  Review of Systems   Constitutional:  Negative for activity change, appetite change, chills, fatigue, fever and unexpected weight change.   HENT:  Negative for mouth sores and nosebleeds.    Eyes:  Negative for photophobia and visual disturbance.   Respiratory:  Negative for chest tightness and shortness of breath.    Cardiovascular:  Negative for chest pain, palpitations and leg swelling.   Gastrointestinal:  Negative for abdominal pain, blood in stool, change in bowel habit, constipation, diarrhea, nausea, vomiting and change in bowel habit.   Genitourinary:  Negative for hematuria and menstrual problem.        Postmenopausal   Musculoskeletal:  Negative for gait problem, joint swelling and myalgias.   Integumentary:  Negative for pallor and rash.   Neurological:  Negative for dizziness, syncope and weakness.   Hematological:  Negative for adenopathy. Does not bruise/bleed easily.   Psychiatric/Behavioral:  Negative for sleep disturbance. The patient is not nervous/anxious.         Physical Exam   Vitals:  BP (!) 140/67   Pulse 102   Ht 5' 3" (1.6 m)   Wt 85.6 kg (188 lb 11.4 oz)   LMP  (LMP Unknown)   SpO2 100%   BMI 33.43 kg/m²     Labs:  No visits with results within 2 Day(s) from this visit.   Latest known visit with results is:   Lab Visit on 10/17/2022   Component Date Value Ref Range Status    WBC 10/17/2022 7.10  3.90 - 12.70 K/uL Final    RBC 10/17/2022 4.87  4.00 - 5.40 M/uL Final    Hemoglobin 10/17/2022 12.7  12.0 - 16.0 g/dL Final    Hematocrit 10/17/2022 40.3  37.0 - 48.5 % Final    MCV 10/17/2022 83  82 - 98 fL Final    MCH 10/17/2022 26.1 (L)  27.0 - 31.0 pg Final    MCHC 10/17/2022 31.5 (L)  32.0 - 36.0 g/dL Final    RDW 10/17/2022 14.0  11.5 - 14.5 % Final    Platelets 10/17/2022 428  150 - 450 K/uL Final    MPV 10/17/2022 8.9 (L)  9.2 - 12.9 fL " Final    Immature Granulocytes 10/17/2022 0.3  0.0 - 0.5 % Final    Gran # (ANC) 10/17/2022 4.0  1.8 - 7.7 K/uL Final    Immature Grans (Abs) 10/17/2022 0.02  0.00 - 0.04 K/uL Final    Comment: Mild elevation in immature granulocytes is non specific and   can be seen in a variety of conditions including stress response,   acute inflammation, trauma and pregnancy. Correlation with other   laboratory and clinical findings is essential.      Lymph # 10/17/2022 2.3  1.0 - 4.8 K/uL Final    Mono # 10/17/2022 0.5  0.3 - 1.0 K/uL Final    Eos # 10/17/2022 0.2  0.0 - 0.5 K/uL Final    Baso # 10/17/2022 0.06  0.00 - 0.20 K/uL Final    nRBC 10/17/2022 0  0 /100 WBC Final    Gran % 10/17/2022 56.8  38.0 - 73.0 % Final    Lymph % 10/17/2022 32.3  18.0 - 48.0 % Final    Mono % 10/17/2022 6.6  4.0 - 15.0 % Final    Eosinophil % 10/17/2022 3.2  0.0 - 8.0 % Final    Basophil % 10/17/2022 0.8  0.0 - 1.9 % Final    Differential Method 10/17/2022 Automated   Final    Iron 10/17/2022 59  30 - 160 ug/dL Final    Transferrin 10/17/2022 239  200 - 375 mg/dL Final    TIBC 10/17/2022 354  250 - 450 ug/dL Final    Saturated Iron 10/17/2022 17 (L)  20 - 50 % Final    Ferritin 10/17/2022 83  20.0 - 300.0 ng/mL Final    Uric Acid 10/17/2022 7.8 (H)  2.4 - 5.7 mg/dL Final    CRP 10/17/2022 23.4 (H)  0.0 - 8.2 mg/L Final    Sed Rate 10/17/2022 63 (H)  0 - 20 mm/Hr Final              CT CHEST WITHOUT CONTRAST 3/18/22  IMPRESSION:     PATCHY SUBPLEURAL AIRSPACE CONSOLIDATION IN THE POSTERIOR RIGHT LOWER LOBE CORRESPONDS TO THE ABNORMALITY SEEN ON RECENT CHEST X-RAY. THIS MAY REPRESENT RESOLVING ATELECTASIS, PNEUMONIA, ETC. THIS APPEARANCE HAS SIGNIFICANTLY IMPROVED COMPARED TO CT SCAN OF CHEST DATED 12/04/2021.     Electronically Signed By: Elian Turner MD 3/18/2022 11:23 AM CDT      TRANSTHORACIC ECHOCARDIOGRAM 3/8/22  Interpretation Summary   Ejection Fraction = 60-65%.   No regional wall motion abnormalities noted.   The left ventricular size,  thickness and function are normal   There is no left ventricular diastolic dysfunction.   normal filling pressures   There is no pericardial effusion.   Previous echo 12/2021 had moderate pericardial effusion.     CHEST XRAY 3/8/22  IMPRESSION:   Possible new 1 cm pulmonary nodule lateral right lung base as described above. This was not present on 1/1/2022. Consider CT chest.          Physical Exam:  Physical Exam  Constitutional:       General: She is not in acute distress.     Appearance: Normal appearance.   HENT:      Head: Normocephalic and atraumatic.      Mouth/Throat:      Mouth: Mucous membranes are moist.      Pharynx: Oropharynx is clear.   Eyes:      General: No scleral icterus.        Right eye: No discharge.         Left eye: No discharge.      Conjunctiva/sclera: Conjunctivae normal.   Cardiovascular:      Rate and Rhythm: Normal rate and regular rhythm.      Pulses: Normal pulses.      Heart sounds: Normal heart sounds. No murmur heard.  Pulmonary:      Effort: Pulmonary effort is normal. No respiratory distress.      Breath sounds: Normal breath sounds.   Abdominal:      General: Bowel sounds are normal. There is no distension.      Palpations: Abdomen is soft. There is no mass.      Tenderness: There is no abdominal tenderness.   Musculoskeletal:         General: Normal range of motion.      Cervical back: Normal range of motion and neck supple. No rigidity.      Right lower leg: No edema.      Left lower leg: No edema.   Skin:     General: Skin is warm and dry.   Neurological:      General: No focal deficit present.      Mental Status: She is alert and oriented to person, place, and time.      Gait: Gait normal.   Psychiatric:         Mood and Affect: Mood normal.         Behavior: Behavior normal.        ECOG:   ECOG SCORE              Discussion     Problem List:  Problem List Items Addressed This Visit    None  Visit Diagnoses       Thrombocytosis    -  Primary    Hyperuricemia        Type 2  diabetes mellitus without complication, without long-term current use of insulin        Acute viral pericarditis        Elevated cholesterol        Family history of cancer        Relevant Orders    Ambulatory referral/consult to Genetics             Thrombocytosis  - Pt with evidence thrombocytosis as early as 2009 by review in epic. Most recent labs are 1/2022 post pericarditis and covid illness which could also have affected platelet values.  - Neg Hep panel, RPR, TSH 1/11/2022.   - Pt denies any symptoms at present.  - She did have a screening colonoscopy today and as above, she states she was told she had small ulcers. Pending full report. If ulcers, there could be occult blood loss, consideration of ALMA which would additionally cause a reactive thrombocytosis.   - Alex 2 with reflex, BCR:ABL1, retics, hgb electrophoresis 7/2022 normal   - 10/17/22 labs with normal H/H, platelets, iron studies, continued elevation of uric acid, esr, crp but improved.   - Next colonoscopy 12/7/22  - Advised to reduce seafood to 1-2x/week given uric acid, if remains elevated will consider allopurinol.       Hyperuricemia  -uric acid elevation, improved  -eats shellfish nearly daily, will decrease to 1-2 times per week  -continue to monitor, if continued elevation will consider allopurinol     Type 2 Diabetes  - Is now taking metformin as prescribed  - Management to continue per pcp     Acute viral pericarditis  - 12/2021 diagnosis, resolution noted in cardiology and pulmonology follow up with repeated cxr and CT as ntoed above  - rIt was considered likely viral related given she cares for several grandchildren, many uris with grandchildren  - cardiology follow up in 1yr as scheduled 3/2022, pulmonology prn    Elevated Cholesterol  - Pt states she is working on lifestyle changes, weight reduction   - Not currently taking pravastatin, reports this was in agreement with her cardiologist if she is successful with lifestyle changes.  -  Management deferred to cardiology     Family history of cancer  - genetic testing referral    Advanced Care Planning  - completed 7/13/22    Advanced Care Planning  Advance care planning  - After our discussion at previous visit, the patient has decided to complete a HCPOA and has appointed karson Yeager as primary at [phone], pastor Janes, at [phone] as secondary, and Nitin Vaca  brother at [phone] as third.       Juanita Seals, NP-C  [facility]  Hematology/Oncology  [address] [place] [address]  ANASTASIYA Kraus  [phone]

## 2022-10-27 ENCOUNTER — PATIENT MESSAGE (OUTPATIENT)
Dept: HEMATOLOGY/ONCOLOGY | Facility: CLINIC | Age: 52
End: 2022-10-27
Payer: COMMERCIAL

## 2022-12-05 ENCOUNTER — TELEPHONE (OUTPATIENT)
Dept: ENDOSCOPY | Facility: HOSPITAL | Age: 52
End: 2022-12-05
Payer: MEDICARE

## 2022-12-05 NOTE — TELEPHONE ENCOUNTER
Spoke with patient about arrival time @ 1230.     Prep instructions reviewed: the day before the procedure, follow a clear liquid diet all day, then start the first 1/2 of prep at 5pm and take 2nd 1/2 of prep @ 0730.  Pt must be completely NPO when prep completed @ 0930.              Medications: Do not take Insulin or oral diabetic medications the day of the procedure.  Take as prescribed: heart, seizure and blood pressure medication in the morning with a sip of water (less than an ounce).  Take any breathing medications and bring inhalers to hospital with you Leave all valuables and jewelry at home.     Wear comfortable clothes to procedure to change into hospital gown You cannot drive for 24 hours after your procedure because you will receive sedation for your procedure to make you comfortable.  A ride must be provided at discharge.

## 2022-12-06 ENCOUNTER — PATIENT MESSAGE (OUTPATIENT)
Dept: HEMATOLOGY/ONCOLOGY | Facility: CLINIC | Age: 52
End: 2022-12-06
Payer: MEDICARE

## 2022-12-06 ENCOUNTER — LAB VISIT (OUTPATIENT)
Dept: LAB | Facility: HOSPITAL | Age: 52
End: 2022-12-06
Attending: NURSE PRACTITIONER
Payer: MEDICARE

## 2022-12-06 DIAGNOSIS — D75.839 THROMBOCYTOSIS: ICD-10-CM

## 2022-12-06 LAB
BASOPHILS # BLD AUTO: 0.05 K/UL (ref 0–0.2)
BASOPHILS NFR BLD: 0.7 % (ref 0–1.9)
CRP SERPL-MCNC: 54.9 MG/L (ref 0–8.2)
DIFFERENTIAL METHOD: ABNORMAL
EOSINOPHIL # BLD AUTO: 0.2 K/UL (ref 0–0.5)
EOSINOPHIL NFR BLD: 2.3 % (ref 0–8)
ERYTHROCYTE [DISTWIDTH] IN BLOOD BY AUTOMATED COUNT: 13.7 % (ref 11.5–14.5)
ERYTHROCYTE [SEDIMENTATION RATE] IN BLOOD BY PHOTOMETRIC METHOD: 41 MM/HR (ref 0–36)
FERRITIN SERPL-MCNC: 110 NG/ML (ref 20–300)
FOLATE SERPL-MCNC: 12.5 NG/ML (ref 4–24)
HCT VFR BLD AUTO: 39.4 % (ref 37–48.5)
HGB BLD-MCNC: 12.4 G/DL (ref 12–16)
IMM GRANULOCYTES # BLD AUTO: 0.06 K/UL (ref 0–0.04)
IMM GRANULOCYTES NFR BLD AUTO: 0.8 % (ref 0–0.5)
IRON SERPL-MCNC: 35 UG/DL (ref 30–160)
LDH SERPL L TO P-CCNC: 163 U/L (ref 110–260)
LYMPHOCYTES # BLD AUTO: 2.1 K/UL (ref 1–4.8)
LYMPHOCYTES NFR BLD: 28.6 % (ref 18–48)
MCH RBC QN AUTO: 26.2 PG (ref 27–31)
MCHC RBC AUTO-ENTMCNC: 31.5 G/DL (ref 32–36)
MCV RBC AUTO: 83 FL (ref 82–98)
MONOCYTES # BLD AUTO: 0.4 K/UL (ref 0.3–1)
MONOCYTES NFR BLD: 6 % (ref 4–15)
NEUTROPHILS # BLD AUTO: 4.5 K/UL (ref 1.8–7.7)
NEUTROPHILS NFR BLD: 61.6 % (ref 38–73)
NRBC BLD-RTO: 0 /100 WBC
PLATELET # BLD AUTO: 420 K/UL (ref 150–450)
PMV BLD AUTO: 8.5 FL (ref 9.2–12.9)
RBC # BLD AUTO: 4.73 M/UL (ref 4–5.4)
RETICS/RBC NFR AUTO: 1.4 % (ref 0.5–2.5)
SATURATED IRON: 11 % (ref 20–50)
TOTAL IRON BINDING CAPACITY: 332 UG/DL (ref 250–450)
TRANSFERRIN SERPL-MCNC: 224 MG/DL (ref 200–375)
URATE SERPL-MCNC: 7.2 MG/DL (ref 2.4–5.7)
VIT B12 SERPL-MCNC: 506 PG/ML (ref 210–950)
WBC # BLD AUTO: 7.37 K/UL (ref 3.9–12.7)

## 2022-12-06 PROCEDURE — 84466 ASSAY OF TRANSFERRIN: CPT | Performed by: NURSE PRACTITIONER

## 2022-12-06 PROCEDURE — 84550 ASSAY OF BLOOD/URIC ACID: CPT | Performed by: NURSE PRACTITIONER

## 2022-12-06 PROCEDURE — 82607 VITAMIN B-12: CPT | Performed by: NURSE PRACTITIONER

## 2022-12-06 PROCEDURE — 36415 COLL VENOUS BLD VENIPUNCTURE: CPT | Performed by: NURSE PRACTITIONER

## 2022-12-06 PROCEDURE — 85045 AUTOMATED RETICULOCYTE COUNT: CPT | Performed by: NURSE PRACTITIONER

## 2022-12-06 PROCEDURE — 82728 ASSAY OF FERRITIN: CPT | Performed by: NURSE PRACTITIONER

## 2022-12-06 PROCEDURE — 86140 C-REACTIVE PROTEIN: CPT | Performed by: NURSE PRACTITIONER

## 2022-12-06 PROCEDURE — 82746 ASSAY OF FOLIC ACID SERUM: CPT | Performed by: NURSE PRACTITIONER

## 2022-12-06 PROCEDURE — 85652 RBC SED RATE AUTOMATED: CPT | Performed by: NURSE PRACTITIONER

## 2022-12-06 PROCEDURE — 85025 COMPLETE CBC W/AUTO DIFF WBC: CPT | Performed by: NURSE PRACTITIONER

## 2022-12-06 PROCEDURE — 83615 LACTATE (LD) (LDH) ENZYME: CPT | Performed by: NURSE PRACTITIONER

## 2022-12-07 ENCOUNTER — HOSPITAL ENCOUNTER (OUTPATIENT)
Facility: HOSPITAL | Age: 52
Discharge: HOME | End: 2022-12-07
Attending: INTERNAL MEDICINE | Admitting: INTERNAL MEDICINE
Payer: MEDICARE

## 2022-12-07 ENCOUNTER — TELEPHONE (OUTPATIENT)
Dept: HEMATOLOGY/ONCOLOGY | Facility: CLINIC | Age: 52
End: 2022-12-07
Payer: MEDICARE

## 2022-12-07 ENCOUNTER — ANESTHESIA (OUTPATIENT)
Dept: ENDOSCOPY | Facility: HOSPITAL | Age: 52
End: 2022-12-07
Payer: MEDICARE

## 2022-12-07 ENCOUNTER — ANESTHESIA EVENT (OUTPATIENT)
Dept: ENDOSCOPY | Facility: HOSPITAL | Age: 52
End: 2022-12-07
Payer: MEDICARE

## 2022-12-07 VITALS
DIASTOLIC BLOOD PRESSURE: 74 MMHG | RESPIRATION RATE: 17 BRPM | OXYGEN SATURATION: 100 % | SYSTOLIC BLOOD PRESSURE: 144 MMHG | HEART RATE: 83 BPM | HEIGHT: 63 IN | BODY MASS INDEX: 33.49 KG/M2 | WEIGHT: 189 LBS | TEMPERATURE: 98 F

## 2022-12-07 DIAGNOSIS — K50.00 CROHN'S DISEASE OF SMALL INTESTINE WITHOUT COMPLICATION: Primary | ICD-10-CM

## 2022-12-07 DIAGNOSIS — K50.00 TERMINAL ILEITIS WITHOUT COMPLICATION: ICD-10-CM

## 2022-12-07 LAB
GLUCOSE SERPL-MCNC: 124 MG/DL (ref 70–110)
POCT GLUCOSE: 124 MG/DL (ref 70–110)

## 2022-12-07 PROCEDURE — 88342 IMHCHEM/IMCYTCHM 1ST ANTB: CPT | Mod: 26,,, | Performed by: PATHOLOGY

## 2022-12-07 PROCEDURE — 45380 COLONOSCOPY AND BIOPSY: CPT | Performed by: INTERNAL MEDICINE

## 2022-12-07 PROCEDURE — 27201012 HC FORCEPS, HOT/COLD, DISP: Performed by: INTERNAL MEDICINE

## 2022-12-07 PROCEDURE — D9220A PRA ANESTHESIA: ICD-10-PCS | Mod: CRNA,,, | Performed by: STUDENT IN AN ORGANIZED HEALTH CARE EDUCATION/TRAINING PROGRAM

## 2022-12-07 PROCEDURE — 88342 IMHCHEM/IMCYTCHM 1ST ANTB: CPT | Performed by: PATHOLOGY

## 2022-12-07 PROCEDURE — 37000008 HC ANESTHESIA 1ST 15 MINUTES: Performed by: INTERNAL MEDICINE

## 2022-12-07 PROCEDURE — 88342 CHG IMMUNOCYTOCHEMISTRY: ICD-10-PCS | Mod: 26,,, | Performed by: PATHOLOGY

## 2022-12-07 PROCEDURE — D9220A PRA ANESTHESIA: ICD-10-PCS | Mod: ANES,,, | Performed by: STUDENT IN AN ORGANIZED HEALTH CARE EDUCATION/TRAINING PROGRAM

## 2022-12-07 PROCEDURE — 25000003 PHARM REV CODE 250: Performed by: STUDENT IN AN ORGANIZED HEALTH CARE EDUCATION/TRAINING PROGRAM

## 2022-12-07 PROCEDURE — 88305 TISSUE EXAM BY PATHOLOGIST: CPT | Performed by: PATHOLOGY

## 2022-12-07 PROCEDURE — 25000003 PHARM REV CODE 250: Performed by: INTERNAL MEDICINE

## 2022-12-07 PROCEDURE — 88305 TISSUE EXAM BY PATHOLOGIST: ICD-10-PCS | Mod: 26,,, | Performed by: PATHOLOGY

## 2022-12-07 PROCEDURE — D9220A PRA ANESTHESIA: Mod: CRNA,,, | Performed by: STUDENT IN AN ORGANIZED HEALTH CARE EDUCATION/TRAINING PROGRAM

## 2022-12-07 PROCEDURE — 45380 PR COLONOSCOPY,BIOPSY: ICD-10-PCS | Mod: ,,, | Performed by: INTERNAL MEDICINE

## 2022-12-07 PROCEDURE — 45380 COLONOSCOPY AND BIOPSY: CPT | Mod: ,,, | Performed by: INTERNAL MEDICINE

## 2022-12-07 PROCEDURE — 37000009 HC ANESTHESIA EA ADD 15 MINS: Performed by: INTERNAL MEDICINE

## 2022-12-07 PROCEDURE — D9220A PRA ANESTHESIA: Mod: ANES,,, | Performed by: STUDENT IN AN ORGANIZED HEALTH CARE EDUCATION/TRAINING PROGRAM

## 2022-12-07 PROCEDURE — 63600175 PHARM REV CODE 636 W HCPCS: Performed by: STUDENT IN AN ORGANIZED HEALTH CARE EDUCATION/TRAINING PROGRAM

## 2022-12-07 PROCEDURE — 88305 TISSUE EXAM BY PATHOLOGIST: CPT | Mod: 26,,, | Performed by: PATHOLOGY

## 2022-12-07 RX ORDER — SODIUM CHLORIDE 0.9 % (FLUSH) 0.9 %
3 SYRINGE (ML) INJECTION
Status: DISCONTINUED | OUTPATIENT
Start: 2022-12-07 | End: 2022-12-07 | Stop reason: HOSPADM

## 2022-12-07 RX ORDER — PROPOFOL 10 MG/ML
INJECTION, EMULSION INTRAVENOUS
Status: DISCONTINUED | OUTPATIENT
Start: 2022-12-07 | End: 2022-12-07

## 2022-12-07 RX ORDER — LIDOCAINE HCL/PF 100 MG/5ML
SYRINGE (ML) INTRAVENOUS
Status: DISCONTINUED | OUTPATIENT
Start: 2022-12-07 | End: 2022-12-07

## 2022-12-07 RX ORDER — SODIUM CHLORIDE 9 MG/ML
INJECTION, SOLUTION INTRAVENOUS CONTINUOUS
Status: DISCONTINUED | OUTPATIENT
Start: 2022-12-07 | End: 2022-12-07 | Stop reason: HOSPADM

## 2022-12-07 RX ORDER — PROPOFOL 10 MG/ML
INJECTION, EMULSION INTRAVENOUS CONTINUOUS PRN
Status: DISCONTINUED | OUTPATIENT
Start: 2022-12-07 | End: 2022-12-07

## 2022-12-07 RX ADMIN — LIDOCAINE HYDROCHLORIDE 50 MG: 20 INJECTION, SOLUTION INTRAVENOUS at 12:12

## 2022-12-07 RX ADMIN — PROPOFOL 150 MCG/KG/MIN: 10 INJECTION, EMULSION INTRAVENOUS at 12:12

## 2022-12-07 RX ADMIN — SODIUM CHLORIDE: 0.9 INJECTION, SOLUTION INTRAVENOUS at 12:12

## 2022-12-07 RX ADMIN — PROPOFOL 90 MG: 10 INJECTION, EMULSION INTRAVENOUS at 12:12

## 2022-12-07 NOTE — PLAN OF CARE
Provided d/c instructions to pt., understanding verbalized.   We give patient a walker upon discharge. Not sure what this call is about.

## 2022-12-07 NOTE — ANESTHESIA PREPROCEDURE EVALUATION
2022  Giovanna Medina is a 52 y.o., female presents for colonoscopy under MAC.    Past Medical History:   Diagnosis Date    Benign paroxysmal vertigo, bilateral     Diabetes mellitus, type 2     Glaucoma suspect of both eyes     History of pericarditis 2021    Hypercholesterolemia     Optic atrophy, unspecified      Past Surgical History:   Procedure Laterality Date    BRAIN SURGERY  2005     SECTION, CLASSIC      COLONOSCOPY N/A 2022    Procedure: COLONOSCOPY Suprep;  Surgeon: Vishal Mariee MD;  Location: [facility];  Service: Endoscopy;  Laterality: N/A;    TUBAL LIGATION       Lab Results   Component Value Date    WBC 7.37 2022    HGB 12.4 2022    HCT 39.4 2022     2022    ALT 19 2022    AST 18 2022     2022    K 4.5 2022     2022    CREATININE 0.9 2022    BUN 9 2022    CO2 24 2022    TSH 0.480 2022           Pre-op Assessment    I have reviewed the Patient Summary Reports.     I have reviewed the Nursing Notes. I have reviewed the NPO Status.   I have reviewed the Medications.     Review of Systems  Anesthesia Hx:  No problems with previous Anesthesia    Hematology/Oncology:         -- Denies Anemia:   Cardiovascular:    Denies Angina.    Pulmonary:  Pulmonary Normal    Renal/:   Denies Chronic Renal Disease.     Endocrine:   Diabetes        Physical Exam  General: Well nourished, Cooperative, Alert and Oriented    Airway:  Mallampati: II   Mouth Opening: Normal  TM Distance: Normal  Tongue: Normal  Neck ROM: Normal ROM    Dental:  Intact    Chest/Lungs:  Clear to auscultation, Normal Respiratory Rate    Heart:  Rate: Normal  Rhythm: Regular Rhythm  Sounds: Normal        Anesthesia Plan  Type of Anesthesia, risks & benefits discussed:    Anesthesia Type: MAC  Intra-op  Monitoring Plan: Standard ASA Monitors  Post Op Pain Control Plan: multimodal analgesia  Induction:  IV  Informed Consent: Informed consent signed with the Patient and all parties understand the risks and agree with anesthesia plan.  All questions answered.   ASA Score: 2    Ready For Surgery From Anesthesia Perspective.     .

## 2022-12-07 NOTE — TRANSFER OF CARE
"Anesthesia Transfer of Care Note    Patient: Giovanna Medina    Procedure(s) Performed: Procedure(s) (LRB):  COLONOSCOPY (N/A)    Patient location: GI    Anesthesia Type: general    Transport from OR: Transported from OR on room air with adequate spontaneous ventilation    Post pain: adequate analgesia    Post assessment: no apparent anesthetic complications    Post vital signs: stable    Level of consciousness: awake and alert    Nausea/Vomiting: no nausea/vomiting    Complications: none    Transfer of care protocol was followed      Last vitals:   Visit Vitals  /75 (BP Location: Left arm, Patient Position: Lying)   Pulse 104   Temp 36.7 °C (98.1 °F) (Temporal)   Resp 20   Ht 5' 3" (1.6 m)   Wt 85.7 kg (189 lb)   LMP  (LMP Unknown)   SpO2 97%   Breastfeeding No   BMI 33.48 kg/m²     "

## 2022-12-07 NOTE — H&P
Short Stay Endoscopy History and Physical    PCP - Carmina Cordova MD    Procedure - Colonoscopy  ASA - per anesthesia  Mallampati - per anesthesia  History of Anesthesia problems - no  Family history Anesthesia problems - no   Plan of anesthesia - General    HPI:  This is a 52 y.o. female here for evaluation of :   Follow up colitis / ileitis   Question crohns disease.      ROS:  Constitutional: No fevers, chills, No weight loss  CV: No chest pain  Pulm: No cough, No shortness of breath  GI: see HPI  Derm: No rash    Medical History:  has a past medical history of Benign paroxysmal vertigo, bilateral, Diabetes mellitus, type 2, Glaucoma suspect of both eyes, History of pericarditis (2021), Hypercholesterolemia, and Optic atrophy, unspecified.    Surgical History:  has a past surgical history that includes  section, classic; Brain surgery (2005); Tubal ligation; and Colonoscopy (N/A, 2022).    Family History: family history includes Breast cancer in her maternal grandmother; Cataracts in her mother; Diabetes in her brother and mother; Glaucoma in her mother; Heart disease in her mother; No Known Problems in her father; Stroke in her mother.. Otherwise no colon cancer, inflammatory bowel disease, or GI malignancies.    Social History:  reports that she has never smoked. She has never used smokeless tobacco. She reports that she does not drink alcohol and does not use drugs.    Review of patient's allergies indicates:   Allergen Reactions    Codeine Anaphylaxis       Medications:   Medications Prior to Admission   Medication Sig Dispense Refill Last Dose    meclizine (ANTIVERT) 12.5 mg tablet Take 1 tablet by mouth daily as needed.   Past Month    metFORMIN (GLUCOPHAGE) 500 MG tablet    Past Week    aspirin 81 MG Chew Take 81 mg by mouth once daily.   More than a month    atropine 1% (ISOPTO ATROPINE) 1 % Drop Place 1 drop into both eyes daily as needed (eye pain). (Patient not taking:  Reported on 10/25/2022) 5 mL 3     BD ALCOHOL SWABS PadM        COMBIGAN 0.2-0.5 % Drop Place 1 drop into both eyes 2 (two) times a day. 45 mL 3     diclofenac sodium (VOLTAREN) 1 % Gel Apply 2 g topically 3 (three) times daily. 100 g 2     ketorolac 0.5% (ACULAR) 0.5 % Drop Place 1 drop into the left eye 3 (three) times daily. (Patient not taking: No sig reported) 10 mL 2     loratadine (CLARITIN) 10 mg tablet Take 1 tablet (10 mg total) by mouth once daily. 30 tablet 0 More than a month    pravastatin (PRAVACHOL) 40 MG tablet    More than a month    TRUE METRIX GLUCOSE TEST STRIP Strp             Physical Exam:    Vital Signs:   Vitals:    12/07/22 1218   BP: 128/76   Pulse: 104   Resp: 18   Temp: 98.1 °F (36.7 °C)       General Appearance: Well appearing in no acute distress  Eyes:    No scleral icterus  ENT: Neck supple, Lips, mucosa, and tongue normal; teeth and gums normal  Abdomen: Soft, non tender, non distended with positive bowel sounds. No hepatosplenomegaly, ascites, or mass.  Extremities: 2+ pulses, no clubbing, cyanosis or edema  Skin: No rash      Labs:  Lab Results   Component Value Date    WBC 7.37 12/06/2022    HGB 12.4 12/06/2022    HCT 39.4 12/06/2022     12/06/2022    ALT 19 07/13/2022    AST 18 07/13/2022     07/13/2022    K 4.5 07/13/2022     07/13/2022    CREATININE 0.9 07/13/2022    BUN 9 07/13/2022    CO2 24 07/13/2022    TSH 0.480 01/11/2022       I have explained the risks and benefits of endoscopy procedures to the patient including but not limited to bleeding, perforation, infection, and death.  The patient was asked if they understand and allowed to ask any further questions to their satisfaction.    Vishal Mariee MD

## 2022-12-07 NOTE — PROVATION PATIENT INSTRUCTIONS
Discharge Summary/Instructions after an Endoscopic Procedure  Patient Name: Giovanna Medina  Patient MRN: [id number]  Patient YOB: 1970 Wednesday, December 7, 2022  Vishal Mariee MD  Dear patient,  As a result of recent federal legislation (The Federal Cures Act), you may   receive lab or pathology results from your procedure in your [facility]   account before your physician is able to contact you. Your physician or   their representative will relay the results to you with their   recommendations at their soonest availability.  Thank you,  Your health is very important to us during the Covid Crisis. Following your   procedure today, you will receive a daily text for 2 weeks asking about   signs or symptoms of Covid 19.  Please respond to this text when you   receive it so we can follow up and keep you as safe as possible.   RESTRICTIONS:  During your procedure today, you received medications for sedation.  These   medications may affect your judgment, balance and coordination.  Therefore,   for 24 hours, you have the following restrictions:   - DO NOT drive a car, operate machinery, make legal/financial decisions,   sign important papers or drink alcohol.    ACTIVITY:  Today: no heavy lifting, straining or running due to procedural   sedation/anesthesia.  The following day: return to full activity including work.  DIET:  Eat and drink normally unless instructed otherwise.     TREATMENT FOR COMMON SIDE EFFECTS:  - Mild abdominal pain, nausea, belching, bloating or excessive gas:  rest,   eat lightly and use a heating pad.  - Sore Throat: treat with throat lozenges and/or gargle with warm salt   water.  - Because air was used during the procedure, expelling large amounts of air   from your rectum or belching is normal.  - If a bowel prep was taken, you may not have a bowel movement for 1-3 days.    This is normal.  SYMPTOMS TO WATCH FOR AND REPORT TO YOUR PHYSICIAN:  1. Abdominal pain or bloating, other  than gas cramps.  2. Chest pain.  3. Back pain.  4. Signs of infection such as: chills or fever occurring within 24 hours   after the procedure.  5. Rectal bleeding, which would show as bright red, maroon, or black stools.   (A tablespoon of blood from the rectum is not serious, especially if   hemorrhoids are present.)  6. Vomiting.  7. Weakness or dizziness.  GO DIRECTLY TO THE NEAREST EMERGENCY ROOM IF YOU HAVE ANY OF THE FOLLOWING:      Difficulty breathing              Chills and/or fever over 101 F   Persistent vomiting and/or vomiting blood   Severe abdominal pain   Severe chest pain   Black, tarry stools   Bleeding- more than one tablespoon   Any other symptom or condition that you feel may need urgent attention  Your doctor recommends these additional instructions:  If any biopsies were taken, your doctors clinic will contact you in 1 to 2   weeks with any results.  - Discharge patient to home.   - Patient has a contact number available for emergencies.  The signs and   symptoms of potential delayed complications were discussed with the   patient.  Return to normal activities tomorrow.  Written discharge   instructions were provided to the patient.   - Resume previous diet.   - Continue present medications.   - Await pathology results.   - Repeat colonoscopy after studies are complete to check healing and to   assess disease activity.   - Return to my office in 1 month.   - We need to discuss starting biologic therapy, favor antiTNF such as   remicade or humira. Obtain abdominal imaging such as CTentero.  For questions, problems or results please call your physician - Vishal Mariee MD.  EMERGENCY PHONE NUMBER: [phone],  LAB RESULTS: [phone]  IF A COMPLICATION OR EMERGENCY SITUATION ARISES AND YOU ARE UNABLE TO REACH   YOUR PHYSICIAN - GO DIRECTLY TO THE EMERGENCY ROOM.  Vishal Mariee MD  12/7/2022 1:16:22 PM  This report has been verified and signed electronically.  Dear patient,  As a  result of recent federal legislation (The Federal Cures Act), you may   receive lab or pathology results from your procedure in your [facility]   account before your physician is able to contact you. Your physician or   their representative will relay the results to you with their   recommendations at their soonest availability.  Thank you,  PROVATION

## 2022-12-07 NOTE — ANESTHESIA POSTPROCEDURE EVALUATION
Anesthesia Post Evaluation    Patient: Giovanna Medina    Procedure(s) Performed: Procedure(s) (LRB):  COLONOSCOPY (N/A)    Final Anesthesia Type: general      Patient location during evaluation: GI PACU  Patient participation: Yes- Able to Participate  Level of consciousness: awake and alert  Post-procedure vital signs: reviewed and stable  Pain management: adequate  Airway patency: patent    PONV status at discharge: No PONV  Anesthetic complications: no      Cardiovascular status: hemodynamically stable, stable and blood pressure returned to baseline  Respiratory status: unassisted, spontaneous ventilation and room air  Hydration status: euvolemic  Follow-up not needed.          Vitals Value Taken Time   /75 12/07/22 1320   Temp 98.1 12/07/22 1320   Pulse 104 12/07/22 1320   Resp 20 12/07/22 1320   SpO2 97% 12/07/22 1320         No case tracking events are documented in the log.      Pain/Lee Ann Score: No data recorded

## 2022-12-07 NOTE — PROGRESS NOTES
Patient ID: Giovanna Medina is a 52 y.o. female.    Chief Complaint: No chief complaint on file.      Telemedicine visit:  The patient location is: home  The chief complaint leading to consultation is: thrombocytosis    Visit type: audiovisual    Face to Face time with patient: 20 minutes  30 minutes of total time spent on the encounter, which includes face to face time and non-face to face time preparing to see the patient (eg, review of tests), Obtaining and/or reviewing separately obtained history, Documenting clinical information in the electronic or other health record, Independently interpreting results (not separately reported) and communicating results to the patient/family/caregiver, or Care coordination (not separately reported).     Each patient to whom he or she provides medical services by telemedicine is:  (1) informed of the relationship between the physician and patient and the respective role of any other health care provider with respect to management of the patient; and (2) notified that he or she may decline to receive medical services by telemedicine and may withdraw from such care at any time.    Notes: see below      History     HPI    Giovanna Medina is a 52yr old female referred to hematology for evaluation of thrombocytosis by personal referral. Daughter being seen for ALMA by Dr CHRISTIANO Macdonald in this clinic and pt wanted to be seen by same group. Comorbidities include diabetes, sees optho for concern of glaucoma, sacroiliac arthritis (diagnosis age 21yrs), vertigo, hypercholesterolemia, has history of viral induced pericarditis in 12/2021 and has followed pulmonology and cardiology for this.  Pt states A1c improved to 5.9 from 7.9 with intermittent metformin usage, weight and diet management which she is continuing to work on. She is currently not on pravastatin for cholesterol stating her cardiologist is giving her a chance as well with weight and diet management. Currently has completed  "follow up with pulmonology, on  [facility] visits with cardiology at [facility]. Pt did complete a screening colonoscopy today 22 which per pt report she has "small ulcers in colon, they said they don't think its cancer but sending specimens to check". Will follow up on this. Pt was on colchicine and TID ibuprofen for  Pericarditis, discontinued 2022 after DUB. She is on a daily baby asa since pericarditis diagnosis. Pericarditis etiology was suspected as viral illness exposure with grandchildren. Covid diagnosis 2021.  Concern for nodule in long on CXR 3/2022 was clarified as old consolidation, improved, on recommended CT chest with cardiology and pulmonology chart review as well as radiography results from [facility] provider office visits. Pericarditis was noted as resolved, EF noted as normal.     Family history includes brother  with history of leukemia but death related to medication response/cardiac arrest; one brother had a nasal cancer with surgical/radiation/chemo management and is currently living; another brother  due to leukemia; another brother  after a type of abdominal cancer,  mother  with CHF complications and aneurysm to heart age 60y, father  due to trauma complications in his mid 30s. Pt's daughter has ALMA.    Pt denies fatigue, flushing, itching, chest pain, sob/carter, leg swelling, n/v/d, abdominal pain, abdominal swelling, hemoptysis, hematemesis, melena, epistaxis, easy bleeding. She has had some intentional weight loss with trying to improve diabetes and cholesterol management.     INTERVAL  -here for thrombocytosis follow up  -history of uterine biopsy negative 2022  -GI colonoscopy 22 with ulcers, negative biopsy, repeat colonoscopy completed yesterday, 22 with repeat biopsies, pt states there is concern for crohns  -denies fatigue, chest pain, sob, abdominal pain, n/v/d, hematemesis, melena, weight loss, " nightsweats; states she is just frustrated dealing with GI concerns and worried on these results especially with family cancer history as previously discussed  -she eats shellfish nearly daily, no etoh use      Past medical, surgical, and medication history, past family history reviewed and uptdated with patient today as noted.      Past Medical History:   Diagnosis Date    Benign paroxysmal vertigo, bilateral     Diabetes mellitus, type 2     Glaucoma suspect of both eyes     History of pericarditis 2021    Hypercholesterolemia     Optic atrophy, unspecified        Past Surgical History:   Procedure Laterality Date    BRAIN SURGERY  2005     SECTION, CLASSIC      COLONOSCOPY N/A 2022    Procedure: COLONOSCOPY Suprep;  Surgeon: Vishal Mariee MD;  Location: [facility];  Service: Endoscopy;  Laterality: N/A;    TUBAL LIGATION         Oncology History:  Oncology History    No history exists.        Review of Systems:  Review of Systems   Constitutional:  Negative for activity change, appetite change, chills, fatigue, fever and unexpected weight change.   HENT:  Negative for mouth sores and nosebleeds.    Eyes:  Negative for photophobia and visual disturbance.   Respiratory:  Negative for chest tightness and shortness of breath.    Cardiovascular:  Negative for chest pain, palpitations and leg swelling.   Gastrointestinal:  Negative for abdominal pain, blood in stool, change in bowel habit, constipation, diarrhea, nausea, vomiting and change in bowel habit.   Genitourinary:  Negative for hematuria and menstrual problem.        Postmenopausal   Musculoskeletal:  Negative for gait problem, joint swelling and myalgias.   Integumentary:  Negative for pallor and rash.   Neurological:  Negative for dizziness, syncope and weakness.   Hematological:  Negative for adenopathy. Does not bruise/bleed easily.   Psychiatric/Behavioral:  Negative for sleep disturbance. The patient is not nervous/anxious.          Physical Exam   Vitals:  LMP  (LMP Unknown)     Labs:  Admission on 12/07/2022, Discharged on 12/07/2022   Component Date Value Ref Range Status    POC Glucose 12/07/2022 124 (A)  70 - 110 MG/DL Final    POCT Glucose 12/07/2022 124 (H)  70 - 110 mg/dL Final      Lab Results   Component Value Date    WBC 7.37 12/06/2022    HGB 12.4 12/06/2022    HCT 39.4 12/06/2022    MCV 83 12/06/2022     12/06/2022       Lab Results   Component Value Date    IRON 35 12/06/2022    TRANSFERRIN 224 12/06/2022    TIBC 332 12/06/2022    FESATURATED 11 (L) 12/06/2022      Lab Results   Component Value Date    FERRITIN 110 12/06/2022             CT CHEST WITHOUT CONTRAST 3/18/22  IMPRESSION:     PATCHY SUBPLEURAL AIRSPACE CONSOLIDATION IN THE POSTERIOR RIGHT LOWER LOBE CORRESPONDS TO THE ABNORMALITY SEEN ON RECENT CHEST X-RAY. THIS MAY REPRESENT RESOLVING ATELECTASIS, PNEUMONIA, ETC. THIS APPEARANCE HAS SIGNIFICANTLY IMPROVED COMPARED TO CT SCAN OF CHEST DATED 12/04/2021.     Electronically Signed By: Elian Turner MD 3/18/2022 11:23 AM CDT      TRANSTHORACIC ECHOCARDIOGRAM 3/8/22  Interpretation Summary   Ejection Fraction = 60-65%.   No regional wall motion abnormalities noted.   The left ventricular size, thickness and function are normal   There is no left ventricular diastolic dysfunction.   normal filling pressures   There is no pericardial effusion.   Previous echo 12/2021 had moderate pericardial effusion.     CHEST XRAY 3/8/22  IMPRESSION:   Possible new 1 cm pulmonary nodule lateral right lung base as described above. This was not present on 1/1/2022. Consider CT chest.          Physical Exam:  Deferred due to telehealth visit    ECOG:   ECOG SCORE              Discussion     Problem List:  Problem List Items Addressed This Visit    None  Visit Diagnoses       Thrombocytosis    -  Primary    Hyperuricemia        Type 2 diabetes mellitus without complication, without long-term current use of insulin        Acute  viral pericarditis        Elevated cholesterol        Family history of cancer                 Thrombocytosis  - Pt with evidence thrombocytosis as early as 2009 by review in [facility]. Most recent labs are 1/2022 post pericarditis and covid illness which could also have affected platelet values.  - Neg Hep panel, RPR, TSH 1/11/2022.   - Pt denies any symptoms at present.  - She did have a screening colonoscopy today and as above, she states she was told she had small ulcers. Pending full report. If ulcers, there could be occult blood loss, consideration of ALMA which would additionally cause a reactive thrombocytosis.   - Alex 2 with reflex, BCR:ABL1, retics, hgb electrophoresis 7/2022 normal   - 10/17/22 labs with normal H/H, platelets, iron studies, continued elevation of uric acid, esr, crp but improved.   - Repeat colonoscopy 12/7/22 with biopsies, crohns concern  - 12/6/22 labs reviewed, normal H/H, mild microcytic evidence, platelets are normal. Iron studies with low iron sat otherwise normal. She will start po iron otc daily.   - Advised to reduce seafood to 1-2x/week given uric acid, if remains elevated will consider allopurinol.   - RTC 3 months with labs and follow up appt      Hyperuricemia  -uric acid elevation, has decreased 1popint over past 4 months  -eats shellfish less frequently  -continue to monitor, if continued elevation will consider allopurinol     Type 2 Diabetes  - Is now taking metformin as prescribed  - Management to continue per pcp     Acute viral pericarditis  - 12/2021 diagnosis, resolution noted in cardiology and pulmonology follow up with repeated cxr and CT as ntoed above  - rIt was considered likely viral related given she cares for several grandchildren, many uris with grandchildren  - cardiology follow up in 1yr as scheduled 3/2022, pulmonology prn    Elevated Cholesterol  - Pt states she is working on lifestyle changes, weight reduction   - Not currently taking pravastatin, reports  this was in agreement with her cardiologist if she is successful with lifestyle changes.  - Management deferred to cardiology     Family history of cancer  - genetic testing referral previously     Advanced Care Planning  - completed 7/13/22    Advanced Care Planning  Advance care planning  - After our discussion at previous visit, the patient has decided to complete a HCPOA and has appointed karson Yeager as primary at [phone], pastor Janes, at [phone] as secondary, and Nitin Vaca Sr brother at [phone] as third.       Juanita Seals, NP-C  [facility]  Hematology/Oncology  [address] [place] [address] [id number]  ANASTASIYA Kraus  [phone]

## 2022-12-08 ENCOUNTER — OFFICE VISIT (OUTPATIENT)
Dept: HEMATOLOGY/ONCOLOGY | Facility: CLINIC | Age: 52
End: 2022-12-08
Payer: MEDICARE

## 2022-12-08 ENCOUNTER — TELEPHONE (OUTPATIENT)
Dept: GASTROENTEROLOGY | Facility: HOSPITAL | Age: 52
End: 2022-12-08
Payer: MEDICARE

## 2022-12-08 ENCOUNTER — TELEPHONE (OUTPATIENT)
Dept: ENDOSCOPY | Facility: HOSPITAL | Age: 52
End: 2022-12-08
Payer: MEDICARE

## 2022-12-08 DIAGNOSIS — E11.9 TYPE 2 DIABETES MELLITUS WITHOUT COMPLICATION, WITHOUT LONG-TERM CURRENT USE OF INSULIN: ICD-10-CM

## 2022-12-08 DIAGNOSIS — D75.839 THROMBOCYTOSIS: Primary | ICD-10-CM

## 2022-12-08 DIAGNOSIS — K50.00 CROHN'S DISEASE OF SMALL INTESTINE WITHOUT COMPLICATION: Primary | ICD-10-CM

## 2022-12-08 DIAGNOSIS — I30.1 ACUTE VIRAL PERICARDITIS: ICD-10-CM

## 2022-12-08 DIAGNOSIS — E79.0 HYPERURICEMIA: ICD-10-CM

## 2022-12-08 DIAGNOSIS — E78.00 ELEVATED CHOLESTEROL: ICD-10-CM

## 2022-12-08 DIAGNOSIS — Z80.9 FAMILY HISTORY OF CANCER: ICD-10-CM

## 2022-12-08 PROCEDURE — 1159F PR MEDICATION LIST DOCUMENTED IN MEDICAL RECORD: ICD-10-PCS | Mod: CPTII,95,, | Performed by: NURSE PRACTITIONER

## 2022-12-08 PROCEDURE — 1159F MED LIST DOCD IN RCRD: CPT | Mod: CPTII,95,, | Performed by: NURSE PRACTITIONER

## 2022-12-08 PROCEDURE — 1160F RVW MEDS BY RX/DR IN RCRD: CPT | Mod: CPTII,95,, | Performed by: NURSE PRACTITIONER

## 2022-12-08 PROCEDURE — 99499 RISK ADDL DX/OHS AUDIT: ICD-10-PCS | Mod: HCWC,95,, | Performed by: NURSE PRACTITIONER

## 2022-12-08 PROCEDURE — 99499 UNLISTED E&M SERVICE: CPT | Mod: HCWC,95,, | Performed by: NURSE PRACTITIONER

## 2022-12-08 PROCEDURE — 1160F PR REVIEW ALL MEDS BY PRESCRIBER/CLIN PHARMACIST DOCUMENTED: ICD-10-PCS | Mod: CPTII,95,, | Performed by: NURSE PRACTITIONER

## 2022-12-08 PROCEDURE — 99215 OFFICE O/P EST HI 40 MIN: CPT | Mod: 95,,, | Performed by: NURSE PRACTITIONER

## 2022-12-08 PROCEDURE — 99215 PR OFFICE/OUTPT VISIT, EST, LEVL V, 40-54 MIN: ICD-10-PCS | Mod: 95,,, | Performed by: NURSE PRACTITIONER

## 2022-12-08 NOTE — TELEPHONE ENCOUNTER
----- Message from Laura Quiles MA sent at 12/7/2022  4:14 PM CST -----  CT scheduled on 12/19/22 at 11am. Can you order the lab that needs to be done?

## 2022-12-08 NOTE — Clinical Note
Labs 3 months cbc, iron/tibc, ferritin, ldh, uric acid, cmp at lab of choice, appt 1-2 days later. Thanks

## 2022-12-09 DIAGNOSIS — E11.9 TYPE 2 DIABETES MELLITUS WITHOUT COMPLICATION, WITHOUT LONG-TERM CURRENT USE OF INSULIN: Primary | ICD-10-CM

## 2022-12-09 DIAGNOSIS — D75.839 THROMBOCYTOSIS: ICD-10-CM

## 2022-12-19 ENCOUNTER — PATIENT MESSAGE (OUTPATIENT)
Dept: ORTHOPEDICS | Facility: CLINIC | Age: 52
End: 2022-12-19

## 2022-12-21 LAB
FINAL PATHOLOGIC DIAGNOSIS: NORMAL
GROSS: NORMAL
Lab: NORMAL
SUPPLEMENTAL DIAGNOSIS: NORMAL

## 2022-12-22 ENCOUNTER — TELEPHONE (OUTPATIENT)
Dept: GASTROENTEROLOGY | Facility: CLINIC | Age: 52
End: 2022-12-22
Payer: MEDICARE

## 2022-12-22 ENCOUNTER — PATIENT MESSAGE (OUTPATIENT)
Dept: GASTROENTEROLOGY | Facility: CLINIC | Age: 52
End: 2022-12-22
Payer: MEDICARE

## 2022-12-22 ENCOUNTER — PATIENT MESSAGE (OUTPATIENT)
Dept: HEMATOLOGY/ONCOLOGY | Facility: CLINIC | Age: 52
End: 2022-12-22
Payer: MEDICARE

## 2022-12-22 NOTE — TELEPHONE ENCOUNTER
----- Message from Vishal Mariee MD sent at 12/21/2022  4:09 PM CST -----  Called and discussed.  This is consistent with crohns disease.    Reviewed CT entero. Chronic inflammation in that area.    We need to set up clinic visit to discuss treatments.   Kemar , please arrange clinic visit, can put on waitlist, not urgent, hopefully mid January or so. (Cant be overbooked slot, will be long visit)

## 2022-12-30 ENCOUNTER — PATIENT MESSAGE (OUTPATIENT)
Dept: HEMATOLOGY/ONCOLOGY | Facility: CLINIC | Age: 52
End: 2022-12-30
Payer: MEDICARE

## 2023-01-09 ENCOUNTER — OFFICE VISIT (OUTPATIENT)
Dept: GASTROENTEROLOGY | Facility: CLINIC | Age: 53
End: 2023-01-09
Payer: MEDICARE

## 2023-01-09 VITALS — BODY MASS INDEX: 32.73 KG/M2 | HEIGHT: 63 IN | WEIGHT: 184.75 LBS

## 2023-01-09 DIAGNOSIS — K50.00 TERMINAL ILEITIS WITHOUT COMPLICATION: ICD-10-CM

## 2023-01-09 DIAGNOSIS — K50.00 CROHN'S DISEASE OF SMALL INTESTINE WITHOUT COMPLICATION: Primary | ICD-10-CM

## 2023-01-09 PROCEDURE — 99215 PR OFFICE/OUTPT VISIT, EST, LEVL V, 40-54 MIN: ICD-10-PCS | Mod: HCWC,S$GLB,, | Performed by: INTERNAL MEDICINE

## 2023-01-09 PROCEDURE — 99999 PR PBB SHADOW E&M-EST. PATIENT-LVL III: CPT | Mod: PBBFAC,HCWC,, | Performed by: INTERNAL MEDICINE

## 2023-01-09 PROCEDURE — 3008F PR BODY MASS INDEX (BMI) DOCUMENTED: ICD-10-PCS | Mod: HCWC,CPTII,S$GLB, | Performed by: INTERNAL MEDICINE

## 2023-01-09 PROCEDURE — 1159F MED LIST DOCD IN RCRD: CPT | Mod: HCWC,CPTII,S$GLB, | Performed by: INTERNAL MEDICINE

## 2023-01-09 PROCEDURE — 1159F PR MEDICATION LIST DOCUMENTED IN MEDICAL RECORD: ICD-10-PCS | Mod: HCWC,CPTII,S$GLB, | Performed by: INTERNAL MEDICINE

## 2023-01-09 PROCEDURE — 3008F BODY MASS INDEX DOCD: CPT | Mod: HCWC,CPTII,S$GLB, | Performed by: INTERNAL MEDICINE

## 2023-01-09 PROCEDURE — 99499 RISK ADDL DX/OHS AUDIT: ICD-10-PCS | Mod: S$GLB,,, | Performed by: INTERNAL MEDICINE

## 2023-01-09 PROCEDURE — 99499 UNLISTED E&M SERVICE: CPT | Mod: S$GLB,,, | Performed by: INTERNAL MEDICINE

## 2023-01-09 PROCEDURE — 99215 OFFICE O/P EST HI 40 MIN: CPT | Mod: HCWC,S$GLB,, | Performed by: INTERNAL MEDICINE

## 2023-01-09 PROCEDURE — 99999 PR PBB SHADOW E&M-EST. PATIENT-LVL III: ICD-10-PCS | Mod: PBBFAC,HCWC,, | Performed by: INTERNAL MEDICINE

## 2023-01-09 NOTE — PROGRESS NOTES
GASTROENTEROLOGY CLINIC NOTE    Reason for visit: The primary encounter diagnosis was Crohn's disease of small intestine without complication. A diagnosis of Terminal ileitis without complication was also pertinent to this visit.  Referring provider/PCP: Carmina Cordova MD    HPI:  Giovanna Medina is a 52 y.o. female here today for ileitis / colitis, concern for possible crohns.    Interval  Here for follow up , accompanied by daughter  She had ct entero showing inflammation at TI and chronic changes at the cecum and ascending.  Repeat colon OFF nsaids for about 6 months showed continued inflammatory changes near cecum / TI.  Continues to do very well.  Now absolutely no GI symptoms.  No changes in bowels no diarrhea no vomiting no weight loss.  Continues to remain off all NSAIDs.      =======================  Initial clinic visit 8/2022  She has no abd symptoms, normal bowel habits, 1 -2 times a day, formed. No wt loss. No vomiting. No other symptoms.  Does have hx of pericarditis. 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 4 -6 pills / week maybe. She isnt sure.     Takes baby ASA daily.   Colchicine and 400mg ibuprofen - stopped in February (was on about 2 months in total)  After REPETITIVE ASKING, THEN SHE ADMITS TO TAKING ADVIL MAYBE 4 -6 TABLET PER WEEK STILL     Family hx - 2 brothers with leukemia , worked at chemical plant.   No family hx of crohns disease.    Prior Endoscopy:  EGD:  Colon:  12/7/2022 with me    Impression:            - Diverticulosis in the sigmoid colon.                          - Crohn's disease with ileitis and colitis.                          Inflammation was found. This was severe. Biopsied.                          - Crohn's disease, with ileitis and colitis.                          Inflammation was found. This was severe, unchanged                           compared to previous examinations. Biopsied.                          - A few erosions in the sigmoid colon. Biopsied.                          - The examination was otherwise normal on direct                          and retroflexion views.                          Exam today( off all nsaids) shows at least similar                          if not worse inflammatory changes mainly in the TI                          and cecum / IC valve, prox ascending colon. There                          is possible tiny pinhole fistulous tract near the                          IC valve, and there may be more upstream TI                          stricturing, could not traverse above about 15 cm                          in the TI, but this could be from looping as well.                          This is most consistent with Crohns disease, with                          significant activity, and I think she warrants                          therapy.   Recommendation:                               - Resume previous diet.                          - Continue present medications.                          - Await pathology results.                          - Repeat colonoscopy after studies are complete to                          check healing and to assess disease activity.                          - Return to my office in 1 month.                          - We need to discuss starting biologic therapy,                          favor antiTNF such as remicade or humira. Obtain                          abdominal imaging such as CTentero.     PATH  1. Terminal ileum, ulceration, biopsy:   - Marked active ileitis   - Negative for granulomas and dysplasia   - CMV immunostain pending   2. Colon, cecum, IC valve, biopsy:   - Marked active chronic colitis   - Negative for granulomas and dysplasia   - CMV immunostain pending   3. Colon, sigmoid, erosion, biopsy:   - Colonic mucosa with no diagnostic histopathologic alterations   -  Negative for active inflammation, granulomas, and dysplasia   Cmv negative    7/2022   Impression:            - Multiple ulcers in the terminal ileum. Biopsied.                          - Congested, eroded, inflamed, scarred and                          ulcerated mucosa in the proximal ascending colon,                          in the cecum and at the ileocecal valve. Biopsied.                          - The descending colon, splenic flexure,                          transverse colon and hepatic flexure are normal.                          Biopsied.                          - Erythematous mucosa in the rectum, in the                          recto-sigmoid colon and in the distal sigmoid                          colon. Biopsied.                          - Diverticulosis in the sigmoid colon.                          - The examination was otherwise normal on direct                          and retroflexion views.                          Exam done for screening, significant for TI and                          cecal ulcerations typical for Crohns disease, with                          some mild irritation in rectum and distal sigmoid.                          Otherwise normal.   Recommendation:                                 - Resume previous diet.                          - Continue present medications.                          - Await pathology results.                          - Repeat colonoscopy after studies are complete to                          check healing.                          - Return to my office in 2 weeks. for workup of                          crohns disease.     PATH  With GI pathologist  Supplemental report for second opinion GI pathology review.   1. Ileum (biopsy):   - Ulcer and active ileitis   - Patchy mild chronic changes   - No granulomas   - No viral inclusions   - No dysplasia   2. Colon, cecum (biopsy):   - Ulcer and chronic active colitis   - Variable severity throughout the biopsy   -  No granulomas   - No viral inclusions   3. Colon, transfers (biopsies):   - Histologically normal colonic mucosa   - Negative for active inflammation   - No granulomas   - Negative for dysplasia   4. Colon, distal sigmoid (biopsies):   - Histologically normal colonic mucosa   - Negative for active inflammation   - No granulomas   - Negative for dysplasia   5. Colon, rectum (biopsy):   - Colonic mucosa with prominent lymphoid aggregates and chronic colitis   including architectural distortion   - Negative for significant active inflammation   - No granulomas, no viral inclusions   ______________________________________________________________________________   ______   Comments:  Findings in the above set of biopsy show ulcer with patchy and   variable chronic active inflammation.  Inflammatory bowel disease is a strong   consideration.  Additional etiologies based on histology alone may include   other forms of ulcer and chronic active inflammation such as ulcer from   medication and ischemia.  Correlation with endoscopic findings and symptom   course is recommended.     (Portions of this note were dictated using voice recognition software and may contain dictation related errors in spelling/grammar/syntax not found on text review)    Review of Systems   Constitutional:  Negative for fever and malaise/fatigue.   Respiratory:  Negative for cough and shortness of breath.    Cardiovascular:  Negative for chest pain and palpitations.   Gastrointestinal:  Negative for abdominal pain, blood in stool, nausea and vomiting.   Neurological:  Negative for dizziness and headaches.     Past Medical History: has a past medical history of Benign paroxysmal vertigo, bilateral, Diabetes mellitus, type 2, Glaucoma suspect of both eyes, History of pericarditis, Hypercholesterolemia, and Optic atrophy, unspecified.    Past Surgical History: has a past surgical history that includes  section, classic; Brain surgery (2005);  "Tubal ligation; Colonoscopy (N/A, 7/13/2022); and Colonoscopy (N/A, 12/7/2022).    Family History:family history includes Breast cancer in her maternal grandmother; Cataracts in her mother; Diabetes in her brother and mother; Glaucoma in her mother; Heart disease in her mother; No Known Problems in her father; Stroke in her mother.    Allergies:   Review of patient's allergies indicates:   Allergen Reactions    Codeine Anaphylaxis       Social History: reports that she has never smoked. She has never used smokeless tobacco. She reports that she does not drink alcohol and does not use drugs.    Home medications:   Current Outpatient Medications on File Prior to Visit   Medication Sig Dispense Refill    atropine 1% (ISOPTO ATROPINE) 1 % Drop Place 1 drop into both eyes daily as needed (eye pain). 5 mL 3    BD ALCOHOL SWABS PadM       COMBIGAN 0.2-0.5 % Drop Place 1 drop into both eyes 2 (two) times a day. 45 mL 3    diclofenac sodium (VOLTAREN) 1 % Gel Apply 2 g topically 3 (three) times daily. 100 g 2    ketorolac 0.5% (ACULAR) 0.5 % Drop Place 1 drop into the left eye 3 (three) times daily. 10 mL 2    meclizine (ANTIVERT) 12.5 mg tablet Take 1 tablet by mouth daily as needed.      metFORMIN (GLUCOPHAGE) 500 MG tablet       TRUE METRIX GLUCOSE TEST STRIP Strp       aspirin 81 MG Chew Take 81 mg by mouth once daily.      loratadine (CLARITIN) 10 mg tablet Take 1 tablet (10 mg total) by mouth once daily. 30 tablet 0    pravastatin (PRAVACHOL) 40 MG tablet        No current facility-administered medications on file prior to visit.       Vital signs:  Ht 5' 3" (1.6 m)   Wt 83.8 kg (184 lb 11.9 oz)   LMP  (LMP Unknown)   BMI 32.73 kg/m²     Physical Exam  Vitals reviewed.   Constitutional:       General: She is not in acute distress.     Appearance: She is not toxic-appearing.   HENT:      Head: Normocephalic and atraumatic.   Eyes:      General: No scleral icterus.     Conjunctiva/sclera: Conjunctivae normal. "   Skin:     General: Skin is warm.      Coloration: Skin is not pale.      Findings: No rash.   Neurological:      Mental Status: She is oriented to person, place, and time.      Gait: Gait normal.   Psychiatric:         Mood and Affect: Mood normal.         Behavior: Behavior normal.       I have reviewed prior labs, imaging, notes from last month    Assessment:  1. Crohn's disease of small intestine without complication    2. Terminal ileitis without complication      We had a long discussion and an overview of everything that has been done.    The diagnosis is Crohn's disease of the right colon and terminal ileum.  This is evident on pathology, repeat colonoscopy, as well as imaging.  The difficult part is that she has absolutely no GI symptoms.  No even subtle symptoms.  She has had some more systemic sequelae of things like pericarditis, thrombocytosis etcetera that maybe manifestations of ongoing inflammatory process.    She is very reluctant to submit any therapy given that she has completely no symptoms.  I can understand this and can appreciate this.    We discussed some of our options, which would include a few things.  I would recommend that we proceed with a trial treatment of something like vedolizumab versus an anti TNF such as Remicade.  She does not believe that she would be able to do self injection needles.  She also has great fears of cancer in general and thus I would favor a side effect profile like vedolizumab.  However we have the option just to monitor this... certainly I would favor treating but the patient has GREAT reluctance to treatment given how well she feels. She also has fear of the medications in general.  Third option would be to have her seek another opinion just to hear another voice on this matter and she DESIRES to at least hear another voice on this.      Plan:       [facility] will [facility] to [facility] to seek another opinoin in this matter , at least so that  patient can hear what another MD thoughts are.    Again she can return to my office after that visit.      RTC after above    Vishal Mariee MD  [facility]    A total of 64 minutes were spent during this encounter including reviewing records, interviewing, examining patient, and counseling / coordination of care.

## 2023-01-25 ENCOUNTER — TELEPHONE (OUTPATIENT)
Dept: GASTROENTEROLOGY | Facility: CLINIC | Age: 53
End: 2023-01-25
Payer: MEDICARE

## 2023-01-25 NOTE — TELEPHONE ENCOUNTER
-Patient has had all care done at [facility]   -New diagnosis from a routine colonoscopy d/t age recommendation  -Has no symptoms per patient   -Notified will be later this summer for appointment

## 2023-02-07 DIAGNOSIS — Z00.00 ENCOUNTER FOR MEDICARE ANNUAL WELLNESS EXAM: ICD-10-CM

## 2023-02-08 ENCOUNTER — PATIENT MESSAGE (OUTPATIENT)
Dept: GASTROENTEROLOGY | Facility: CLINIC | Age: 53
End: 2023-02-08
Payer: MEDICARE

## 2023-02-09 DIAGNOSIS — Z00.00 ENCOUNTER FOR MEDICARE ANNUAL WELLNESS EXAM: ICD-10-CM

## 2023-03-13 ENCOUNTER — OFFICE VISIT (OUTPATIENT)
Dept: HEMATOLOGY/ONCOLOGY | Facility: CLINIC | Age: 53
End: 2023-03-13
Payer: MEDICARE

## 2023-03-13 VITALS
HEART RATE: 90 BPM | BODY MASS INDEX: 32.53 KG/M2 | SYSTOLIC BLOOD PRESSURE: 122 MMHG | WEIGHT: 183.63 LBS | OXYGEN SATURATION: 97 % | DIASTOLIC BLOOD PRESSURE: 81 MMHG

## 2023-03-13 DIAGNOSIS — E11.9 TYPE 2 DIABETES MELLITUS WITHOUT COMPLICATION, WITHOUT LONG-TERM CURRENT USE OF INSULIN: ICD-10-CM

## 2023-03-13 DIAGNOSIS — Z80.9 FAMILY HISTORY OF CANCER: ICD-10-CM

## 2023-03-13 DIAGNOSIS — E79.0 HYPERURICEMIA: ICD-10-CM

## 2023-03-13 DIAGNOSIS — D75.839 THROMBOCYTOSIS: Primary | ICD-10-CM

## 2023-03-13 DIAGNOSIS — E78.00 ELEVATED CHOLESTEROL: ICD-10-CM

## 2023-03-13 DIAGNOSIS — K50.00 CROHN'S DISEASE OF SMALL INTESTINE WITHOUT COMPLICATION: ICD-10-CM

## 2023-03-13 DIAGNOSIS — D50.9 MICROCYTIC ANEMIA: ICD-10-CM

## 2023-03-13 DIAGNOSIS — I30.1 ACUTE VIRAL PERICARDITIS: ICD-10-CM

## 2023-03-13 PROCEDURE — 99499 UNLISTED E&M SERVICE: CPT | Mod: HCWC,S$GLB,, | Performed by: NURSE PRACTITIONER

## 2023-03-13 PROCEDURE — 99999 PR PBB SHADOW E&M-EST. PATIENT-LVL III: ICD-10-PCS | Mod: PBBFAC,HCWC,, | Performed by: NURSE PRACTITIONER

## 2023-03-13 PROCEDURE — 3074F PR MOST RECENT SYSTOLIC BLOOD PRESSURE < 130 MM HG: ICD-10-PCS | Mod: HCWC,CPTII,S$GLB, | Performed by: NURSE PRACTITIONER

## 2023-03-13 PROCEDURE — 99999 PR PBB SHADOW E&M-EST. PATIENT-LVL III: CPT | Mod: PBBFAC,HCWC,, | Performed by: NURSE PRACTITIONER

## 2023-03-13 PROCEDURE — 3079F PR MOST RECENT DIASTOLIC BLOOD PRESSURE 80-89 MM HG: ICD-10-PCS | Mod: HCWC,CPTII,S$GLB, | Performed by: NURSE PRACTITIONER

## 2023-03-13 PROCEDURE — 99499 RISK ADDL DX/OHS AUDIT: ICD-10-PCS | Mod: HCWC,S$GLB,, | Performed by: NURSE PRACTITIONER

## 2023-03-13 PROCEDURE — 1159F MED LIST DOCD IN RCRD: CPT | Mod: HCWC,CPTII,S$GLB, | Performed by: NURSE PRACTITIONER

## 2023-03-13 PROCEDURE — 1159F PR MEDICATION LIST DOCUMENTED IN MEDICAL RECORD: ICD-10-PCS | Mod: HCWC,CPTII,S$GLB, | Performed by: NURSE PRACTITIONER

## 2023-03-13 PROCEDURE — 99215 PR OFFICE/OUTPT VISIT, EST, LEVL V, 40-54 MIN: ICD-10-PCS | Mod: HCWC,S$GLB,, | Performed by: NURSE PRACTITIONER

## 2023-03-13 PROCEDURE — 3074F SYST BP LT 130 MM HG: CPT | Mod: HCWC,CPTII,S$GLB, | Performed by: NURSE PRACTITIONER

## 2023-03-13 PROCEDURE — 1160F PR REVIEW ALL MEDS BY PRESCRIBER/CLIN PHARMACIST DOCUMENTED: ICD-10-PCS | Mod: HCWC,CPTII,S$GLB, | Performed by: NURSE PRACTITIONER

## 2023-03-13 PROCEDURE — 3008F PR BODY MASS INDEX (BMI) DOCUMENTED: ICD-10-PCS | Mod: HCWC,CPTII,S$GLB, | Performed by: NURSE PRACTITIONER

## 2023-03-13 PROCEDURE — 1160F RVW MEDS BY RX/DR IN RCRD: CPT | Mod: HCWC,CPTII,S$GLB, | Performed by: NURSE PRACTITIONER

## 2023-03-13 PROCEDURE — 3079F DIAST BP 80-89 MM HG: CPT | Mod: HCWC,CPTII,S$GLB, | Performed by: NURSE PRACTITIONER

## 2023-03-13 PROCEDURE — 99215 OFFICE O/P EST HI 40 MIN: CPT | Mod: HCWC,S$GLB,, | Performed by: NURSE PRACTITIONER

## 2023-03-13 PROCEDURE — 3008F BODY MASS INDEX DOCD: CPT | Mod: HCWC,CPTII,S$GLB, | Performed by: NURSE PRACTITIONER

## 2023-03-13 NOTE — PROGRESS NOTES
"Patient ID: Giovanna Medina is a 52 y.o. female.    Chief Complaint: Thrombocytosis          History     HPI    Giovanna Medina is a 52yr old female referred to hematology for evaluation of thrombocytosis by personal referral. Daughter being seen for ALMA by Dr CHRISTIANO Macdonald in this clinic and pt wanted to be seen by same group. Comorbidities include diabetes, sees optho for concern of glaucoma, sacroiliac arthritis (diagnosis age 21yrs), vertigo, hypercholesterolemia, has history of viral induced pericarditis in 2021 and has followed pulmonology and cardiology for this.  Pt states A1c improved to 5.9 from 7.9 with intermittent metformin usage, weight and diet management which she is continuing to work on. She is currently not on pravastatin for cholesterol stating her cardiologist is giving her a chance as well with weight and diet management. Currently has completed follow up with pulmonology, on  Yearly visits with cardiology at [facility]. Pt did complete a screening colonoscopy today 22 which per pt report she has "small ulcers in colon, they said they don't think its cancer but sending specimens to check". Will follow up on this. Pt was on colchicine and TID ibuprofen for  Pericarditis, discontinued 2022 after DUB. She is on a daily baby asa since pericarditis diagnosis. Pericarditis etiology was suspected as viral illness exposure with grandchildren. Covid diagnosis 2021.  Concern for nodule in long on CXR 3/2022 was clarified as old consolidation, improved, on recommended CT chest with cardiology and pulmonology chart review as well [facility]. Pericarditis was noted as resolved, EF noted as normal.     Family history includes brother  with history of leukemia but death related to medication response/cardiac arrest; one brother had a nasal cancer with surgical/radiation/chemo management and is currently living; another " brother  due to leukemia; another brother  after a type of abdominal cancer,  mother  with CHF complications and aneurysm to heart age 60y, father  due to trauma complications in his mid 30s. Pt's daughter has ALMA.    Pt denies fatigue, flushing, itching, chest pain, sob/carter, leg swelling, n/v/d, abdominal pain, abdominal swelling, hemoptysis, hematemesis, melena, epistaxis, easy bleeding. She has had some intentional weight loss with trying to improve diabetes and cholesterol management.     INTERVAL  -here for thrombocytosis follow up  -history of uterine biopsy negative 2022  -GI colonoscopy 22 with ulcers, negative biopsy, repeat colonoscopy completed 22 with repeat biopsies, pt states there is concern for crohns and is right now not sure if they will do surgery to remove concerned portion or just monitor, she has not had any notable gi blood loss  -denies fatigue, flushing, chest pain, sob, abdominal pain, n/v/d, hematemesis, melena, weight loss, nightsweats  -states she is overall feeling well, her pcp is encouraging weight loss, is now on ozempic for diabetes and weight and states trying to find time to start exercising again  -she has reduced shellfish intake from daily, no etoh use      Past medical, surgical, and medication history, past family history reviewed and uptdated with patient today as noted.      Past Medical History:   Diagnosis Date    Benign paroxysmal vertigo, bilateral     Diabetes mellitus, type 2     Glaucoma suspect of both eyes     History of pericarditis 2021    Hypercholesterolemia     Optic atrophy, unspecified        Past Surgical History:   Procedure Laterality Date    BRAIN SURGERY  2005     SECTION, CLASSIC      COLONOSCOPY N/A 2022    Procedure: COLONOSCOPY Suprep;  Surgeon: Vishal Mariee MD;  Location: [facility];  Service: Endoscopy;  Laterality: N/A;    COLONOSCOPY N/A 2022    Procedure: COLONOSCOPY;   Surgeon: Vishal Mariee MD;  Location: [facility];  Service: Endoscopy;  Laterality: N/A;    TUBAL LIGATION         Oncology History:  Oncology History    No history exists.        Review of Systems:  Review of Systems   Constitutional:  Negative for activity change, appetite change, chills, fatigue, fever and unexpected weight change.   HENT:  Negative for mouth sores and nosebleeds.    Eyes:  Negative for photophobia and visual disturbance.   Respiratory:  Negative for chest tightness and shortness of breath.    Cardiovascular:  Negative for chest pain, palpitations and leg swelling.   Gastrointestinal:  Negative for abdominal pain, blood in stool, change in bowel habit, constipation, diarrhea, nausea, vomiting and change in bowel habit.   Genitourinary:  Negative for hematuria and menstrual problem.        Postmenopausal   Musculoskeletal:  Negative for gait problem, joint swelling and myalgias.   Integumentary:  Negative for pallor and rash.   Neurological:  Negative for dizziness, syncope and weakness.   Hematological:  Negative for adenopathy. Does not bruise/bleed easily.   Psychiatric/Behavioral:  Negative for sleep disturbance. The patient is not nervous/anxious.         Physical Exam   Vitals:  /81   Pulse 90   Wt 83.3 kg (183 lb 10.3 oz)   LMP  (LMP Unknown)   SpO2 97%   BMI 32.53 kg/m²     Labs:     Lab Results   Component Value Date    WBC 6.47 03/10/2023    HGB 13.3 03/10/2023    HCT 41.2 03/10/2023    MCV 82 03/10/2023     (H) 03/10/2023       Lab Results   Component Value Date    IRON 40 03/10/2023    TRANSFERRIN 246 03/10/2023    TIBC 364 03/10/2023    FESATURATED 11 (L) 03/10/2023      Lab Results   Component Value Date    FERRITIN 75 03/10/2023               EGD, colonoscopy 12/7/22 with Dr Mariee  Impression:    - Diverticulosis in the sigmoid colon.                          - Crohn's disease with ileitis and colitis.                          Inflammation was found. This was  severe. Biopsied.                          - Crohn's disease, with ileitis and colitis.                          Inflammation was found. This was severe, unchanged                          compared to previous examinations. Biopsied.                          - A few erosions in the sigmoid colon. Biopsied.                          - The examination was otherwise normal on direct                          and retroflexion views.                          Exam today( off all nsaids) shows at least similar                          if not worse inflammatory changes mainly in the TI                          and cecum / IC valve, prox ascending colon. There                          is possible tiny pinhole fistulous tract near the                          IC valve, and there may be more upstream TI                          stricturing, could not traverse above about 15 cm                          in the TI, but this could be from looping as well.                          This is most consistent with Crohns disease, with                          significant activity, and I think she warrants                          therapy.       CT CHEST WITHOUT CONTRAST 3/18/22  IMPRESSION:     PATCHY SUBPLEURAL AIRSPACE CONSOLIDATION IN THE POSTERIOR RIGHT LOWER LOBE CORRESPONDS TO THE ABNORMALITY SEEN ON RECENT CHEST X-RAY. THIS MAY REPRESENT RESOLVING ATELECTASIS, PNEUMONIA, ETC. THIS APPEARANCE HAS SIGNIFICANTLY IMPROVED COMPARED TO CT SCAN OF CHEST DATED 12/04/2021.     Electronically Signed By: Elian Turner MD 3/18/2022 11:23 AM CDT      TRANSTHORACIC ECHOCARDIOGRAM 3/8/22  Interpretation Summary   Ejection Fraction = 60-65%.   No regional wall motion abnormalities noted.   The left ventricular size, thickness and function are normal   There is no left ventricular diastolic dysfunction.   normal filling pressures   There is no pericardial effusion.   Previous echo 12/2021 had moderate pericardial effusion.     CHEST XRAY  3/8/22  IMPRESSION:   Possible new 1 cm pulmonary nodule lateral right lung base as described above. This was not present on 1/1/2022. Consider CT chest.          Physical Exam:  Physical Exam   Constitutional: She is oriented to person, place, and time. She appears obese.  Non-toxic appearance. No distress. She does not appear ill.   HENT:   Head: Normocephalic and atraumatic.   Mouth/Throat: Mucous membranes are moist. Oropharynx is clear.   Eyes: Conjunctivae are normal. No scleral icterus.   Cardiovascular: Normal rate, regular rhythm, normal heart sounds and normal pulses.   No murmur heard.Pulmonary:      Effort: Pulmonary effort is normal.      Breath sounds: Normal breath sounds.     Abdominal: Soft. Bowel sounds are normal. She exhibits no distension. There is no abdominal tenderness. There is no guarding.   Musculoskeletal:      Cervical back: Normal range of motion and neck supple. No rigidity.      Right lower leg: No edema.      Left lower leg: No edema.   Neurological: She is alert and oriented to person, place, and time. Gait normal.   Skin: Skin is warm and dry. No bruising noted. She is not diaphoretic. No jaundice or pallor.   Psychiatric: Her behavior is normal. Mood normal.   Nursing note and vitals reviewed.      ECOG:   ECOG SCORE    0 - Fully active-able to carry on all pre-disease performance without restriction          Discussion     Problem List:  Problem List Items Addressed This Visit    None  Visit Diagnoses       Thrombocytosis    -  Primary    Hyperuricemia        Type 2 diabetes mellitus without complication, without long-term current use of insulin        Acute viral pericarditis        Elevated cholesterol        Family history of cancer        Microcytic anemia        Crohn's disease of small intestine without complication                   Thrombocytosis, microcytic anemia  - Pt with evidence thrombocytosis as early as 2009 by review in [facility]. Most recent labs are 1/2022 post  pericarditis and covid illness which could also have affected platelet values.  - Neg Hep panel, RPR, TSH 1/11/2022.   - Pt denies any symptoms at present.  - She did have a screening colonoscopy today and as above, she states she was told she had small ulcers. Pending full report. If ulcers, there could be occult blood loss, consideration of ALMA which would additionally cause a reactive thrombocytosis.   - Alex 2 with reflex, BCR:ABL1, retics, hgb electrophoresis 7/2022 normal   - 10/17/22 labs with normal H/H, platelets, iron studies, continued elevation of uric acid, esr, crp but improved.   - Repeat colonoscopy 12/7/22 with biopsies, crohns concern  - 12/6/22 labs reviewed, normal H/H, mild microcytic evidence, platelets are normal. Iron studies with low iron sat otherwise normal. She will start po iron otc daily.   - Advised to reduce seafood to 1-2x/week given uric acid, if remains elevated will consider allopurinol.   - 3/10/23 labs with normal H/H, mild microcytic evidence, platelets with slight increase to 486 K/uL, iron studies stable with low iron sat otherwise normal iron level and ferritin.  - RTC 3 months with labs and follow up appt      Hyperuricemia  -uric acid elevation, has now decreased to 6.4 mg/dL  -eats shellfish less frequently  -has been on colchicine in past, now stopped with GI concerns  -continue to monitor    Type 2 Diabetes  - noncompliant with metformin due to side effect concerns, taking ozempic as prescribed by pcp  - Management to continue per pcp     Acute viral pericarditis  - 12/2021 diagnosis, resolution noted in cardiology and pulmonology follow up with repeated cxr and CT as ntoed above  - considered likely viral related given she cares for several grandchildren, many uris with grandchildren  - cardiology follow up in 1yr as scheduled 3/2022, pulmonology prn    Elevated Cholesterol  - Pt states she is working on lifestyle changes, weight reduction   - Not currently taking  pravastatin, reports this was in agreement with her cardiologist if she is successful with lifestyle changes.  - Management deferred to cardiology     Crohns small intestine without complication  - no ASA at present due to GI side effects  - no symptoms at present  - second opinion in process as she and Dr Mariee discussed at 1/9/23 appt  - management deferred to GI      Family history of cancer  - genetic testing referral previously     Advanced Care Planning  - completed 7/13/22    Advanced Care Planning  Advance care planning  - After our discussion at previous visit, the patient has decided to complete a HCPOA and has appointed karson Yeager as primary at [phone], pastor Janes, at [phone] as secondary, and Nitin Washington Sr brother at [phone] as third.       Juanita Seals, NP-C  [facility]  Hematology/Oncology  [address] [place] [address] [id number]  [phone]

## 2023-06-08 ENCOUNTER — OFFICE VISIT (OUTPATIENT)
Dept: URGENT CARE | Facility: CLINIC | Age: 53
End: 2023-06-08
Payer: MEDICARE

## 2023-06-08 VITALS
SYSTOLIC BLOOD PRESSURE: 125 MMHG | WEIGHT: 183 LBS | OXYGEN SATURATION: 97 % | HEIGHT: 63 IN | TEMPERATURE: 99 F | RESPIRATION RATE: 18 BRPM | HEART RATE: 87 BPM | BODY MASS INDEX: 32.43 KG/M2 | DIASTOLIC BLOOD PRESSURE: 90 MMHG

## 2023-06-08 DIAGNOSIS — Z20.822 CLOSE EXPOSURE TO COVID-19 VIRUS: ICD-10-CM

## 2023-06-08 DIAGNOSIS — R53.83 FATIGUE, UNSPECIFIED TYPE: Primary | ICD-10-CM

## 2023-06-08 DIAGNOSIS — Z20.828 EXPOSURE TO THE FLU: ICD-10-CM

## 2023-06-08 LAB
CTP QC/QA: YES
CTP QC/QA: YES
POC MOLECULAR INFLUENZA A AGN: NEGATIVE
POC MOLECULAR INFLUENZA B AGN: NEGATIVE
SARS-COV-2 AG RESP QL IA.RAPID: NEGATIVE

## 2023-06-08 PROCEDURE — 99213 OFFICE O/P EST LOW 20 MIN: CPT | Mod: S$GLB,,, | Performed by: NURSE PRACTITIONER

## 2023-06-08 PROCEDURE — 87502 POCT INFLUENZA A/B MOLECULAR: ICD-10-PCS | Mod: QW,S$GLB,, | Performed by: NURSE PRACTITIONER

## 2023-06-08 PROCEDURE — 87811 SARS CORONAVIRUS 2 ANTIGEN POCT, MANUAL READ: ICD-10-PCS | Mod: QW,S$GLB,, | Performed by: NURSE PRACTITIONER

## 2023-06-08 PROCEDURE — 87811 SARS-COV-2 COVID19 W/OPTIC: CPT | Mod: QW,S$GLB,, | Performed by: NURSE PRACTITIONER

## 2023-06-08 PROCEDURE — 87502 INFLUENZA DNA AMP PROBE: CPT | Mod: QW,S$GLB,, | Performed by: NURSE PRACTITIONER

## 2023-06-08 PROCEDURE — 99213 PR OFFICE/OUTPT VISIT, EST, LEVL III, 20-29 MIN: ICD-10-PCS | Mod: S$GLB,,, | Performed by: NURSE PRACTITIONER

## 2023-06-08 NOTE — PROGRESS NOTES
"Subjective:      Patient ID: Giovanna Medina is a 53 y.o. female.    Vitals:  height is 5' 3" (1.6 m) and weight is 83 kg (183 lb). Her temperature is 98.6 °F (37 °C). Her blood pressure is 125/90 (abnormal) and her pulse is 87. Her respiration is 18 and oxygen saturation is 97%.     Chief Complaint: Diarrhea    54 yo female presents with body aches and fatigue. She also notes mild diarrhea which she attributes to taking Ozempic.  She was  exposed to covid/flu in the household.     Diarrhea   This is a new problem. The current episode started in the past 7 days. The problem occurs 2 to 4 times per day. The problem has been gradually improving. Associated symptoms include sweats. Pertinent negatives include no chills, coughing or fever. Treatments tried: tylenol.     Constitution: Positive for fatigue. Negative for chills and fever.   HENT:  Negative for ear pain, congestion, postnasal drip and sore throat.    Respiratory:  Negative for cough and shortness of breath.    Gastrointestinal:  Positive for diarrhea.    Objective:     Physical Exam   Constitutional: She is oriented to person, place, and time. She appears well-developed. She is cooperative.  Non-toxic appearance. She does not appear ill. No distress.   HENT:   Head: Normocephalic and atraumatic.   Ears:   Right Ear: Hearing, tympanic membrane, external ear and ear canal normal.   Left Ear: Hearing, tympanic membrane, external ear and ear canal normal.   Nose: Nose normal. No mucosal edema, rhinorrhea or nasal deformity. No epistaxis. Right sinus exhibits no maxillary sinus tenderness and no frontal sinus tenderness. Left sinus exhibits no maxillary sinus tenderness and no frontal sinus tenderness.   Mouth/Throat: Uvula is midline, oropharynx is clear and moist and mucous membranes are normal. No trismus in the jaw. Normal dentition. No uvula swelling. No oropharyngeal exudate, posterior oropharyngeal edema or posterior oropharyngeal erythema.   Eyes: " Conjunctivae and lids are normal. No scleral icterus.   Neck: Trachea normal and phonation normal. Neck supple. No edema present. No erythema present. No neck rigidity present.   Cardiovascular: Normal rate, regular rhythm, normal heart sounds and normal pulses.   Pulmonary/Chest: Effort normal and breath sounds normal. No respiratory distress. She has no decreased breath sounds. She has no rhonchi.   Abdominal: Normal appearance and bowel sounds are normal. Soft. flat abdomen   Musculoskeletal: Normal range of motion.         General: No deformity. Normal range of motion.   Neurological: no focal deficit. She is alert and oriented to person, place, and time. She exhibits normal muscle tone. Coordination normal.   Skin: Skin is warm, dry, intact, not diaphoretic and not pale.   Psychiatric: Her speech is normal and behavior is normal. Judgment and thought content normal.   Nursing note and vitals reviewed.    Assessment:     1. Fatigue, unspecified type    2. Close exposure to COVID-19 virus    3. Exposure to the flu        Plan:       Fatigue, unspecified type  -     SARS Coronavirus 2 Antigen, POCT Manual Read  -     POCT Influenza A/B MOLECULAR    Close exposure to COVID-19 virus    Exposure to the flu          Medical Decision Making:   Initial Assessment:   Nontoxic appearing 52 yo female c/o fatigue, body aches and diarrhea.   Differential Diagnosis:   URI, COVID, FLU, effect of medication   Clinical Tests:   Lab Tests: Reviewed       <> Summary of Lab: COVID  FLU  Advised patient that due to exposure in the home she may need to retest. Voiced understanding      Results for orders placed or performed in visit on 06/08/23   SARS Coronavirus 2 Antigen, POCT Manual Read   Result Value Ref Range    SARS Coronavirus 2 Antigen Negative Negative     Acceptable Yes    POCT Influenza A/B MOLECULAR   Result Value Ref Range    POC Molecular Influenza A Ag Negative Negative, Not Reported    POC Molecular  Influenza B Ag Negative Negative, Not Reported     Acceptable Yes      Patient Instructions   CDC recommends quarantine for 5 days for any non-vaccinated person who is exposed to COVID. Treat symptoms, encourage fluids and rest. Follow up with any new or worsening symptoms.

## 2023-06-08 NOTE — PATIENT INSTRUCTIONS
CDC recommends quarantine for 5 days for any non-vaccinated person who is exposed to COVID. Treat symptoms, encourage fluids and rest. Follow up with any new or worsening symptoms.

## 2023-06-11 ENCOUNTER — OFFICE VISIT (OUTPATIENT)
Dept: URGENT CARE | Facility: CLINIC | Age: 53
End: 2023-06-11
Payer: MEDICARE

## 2023-06-11 VITALS
HEIGHT: 63 IN | WEIGHT: 183 LBS | HEART RATE: 132 BPM | DIASTOLIC BLOOD PRESSURE: 83 MMHG | TEMPERATURE: 100 F | RESPIRATION RATE: 19 BRPM | SYSTOLIC BLOOD PRESSURE: 116 MMHG | BODY MASS INDEX: 32.43 KG/M2 | OXYGEN SATURATION: 95 %

## 2023-06-11 DIAGNOSIS — U07.1 COVID-19 VIRUS DETECTED: ICD-10-CM

## 2023-06-11 DIAGNOSIS — R00.0 TACHYCARDIA: ICD-10-CM

## 2023-06-11 DIAGNOSIS — U07.1 COVID-19: Primary | ICD-10-CM

## 2023-06-11 LAB
CTP QC/QA: YES
SARS-COV-2 AG RESP QL IA.RAPID: POSITIVE

## 2023-06-11 PROCEDURE — 99214 PR OFFICE/OUTPT VISIT, EST, LEVL IV, 30-39 MIN: ICD-10-PCS | Mod: S$GLB,,,

## 2023-06-11 PROCEDURE — 93010 EKG 12-LEAD: ICD-10-PCS | Mod: S$GLB,,, | Performed by: INTERNAL MEDICINE

## 2023-06-11 PROCEDURE — 93005 EKG 12-LEAD: ICD-10-PCS | Mod: S$GLB,,,

## 2023-06-11 PROCEDURE — 99214 OFFICE O/P EST MOD 30 MIN: CPT | Mod: S$GLB,,,

## 2023-06-11 PROCEDURE — 93005 ELECTROCARDIOGRAM TRACING: CPT | Mod: S$GLB,,,

## 2023-06-11 PROCEDURE — 87811 SARS-COV-2 COVID19 W/OPTIC: CPT | Mod: QW,S$GLB,,

## 2023-06-11 PROCEDURE — 93010 ELECTROCARDIOGRAM REPORT: CPT | Mod: S$GLB,,, | Performed by: INTERNAL MEDICINE

## 2023-06-11 PROCEDURE — 87811 SARS CORONAVIRUS 2 ANTIGEN POCT, MANUAL READ: ICD-10-PCS | Mod: QW,S$GLB,,

## 2023-06-11 RX ORDER — BENZONATATE 100 MG/1
100 CAPSULE ORAL 3 TIMES DAILY PRN
Qty: 30 CAPSULE | Refills: 0 | Status: SHIPPED | OUTPATIENT
Start: 2023-06-11 | End: 2023-06-21

## 2023-06-11 RX ORDER — SEMAGLUTIDE 0.68 MG/ML
INJECTION, SOLUTION SUBCUTANEOUS WEEKLY
COMMUNITY
Start: 2023-05-08

## 2023-06-11 NOTE — PROGRESS NOTES
"Subjective:      Patient ID: Giovanna Medina is a 53 y.o. female.    Vitals:  height is 5' 3" (1.6 m) and weight is 83 kg (183 lb). Her temperature is 99.8 °F (37.7 °C). Her blood pressure is 116/83 and her pulse is 138 (abnormal). Her respiration is 19 and oxygen saturation is 95%.     Chief Complaint: Cough    52yo female pt presents to the clinic with symptoms of- body aches, cough, sore throat, congestion and headache that started yesterday. Pt stated she has been exposed to COVID and the flu and her grandson has tested positive for both COVID and FLU. Pt reports that she has not taken anything for the symptoms.    Cough  This is a new problem. The current episode started yesterday. The problem has been gradually worsening. The problem occurs hourly. The cough is Productive of sputum. Associated symptoms include a fever, headaches, nasal congestion and a sore throat. Nothing aggravates the symptoms. She has tried nothing for the symptoms. There is no history of asthma, bronchitis or COPD.     Constitution: Positive for fever.   HENT:  Positive for sore throat.    Respiratory:  Positive for cough.    Neurological:  Positive for headaches.    Objective:     Physical Exam   Constitutional: She is oriented to person, place, and time. She appears well-developed. She is cooperative.  Non-toxic appearance. She appears ill. No distress.   HENT:   Head: Normocephalic and atraumatic.   Ears:   Right Ear: Hearing, tympanic membrane, external ear and ear canal normal.   Left Ear: Hearing, tympanic membrane, external ear and ear canal normal.   Nose: No mucosal edema, rhinorrhea or congestion. Right sinus exhibits no maxillary sinus tenderness and no frontal sinus tenderness. Left sinus exhibits no maxillary sinus tenderness and no frontal sinus tenderness.   Mouth/Throat: Uvula is midline and mucous membranes are normal. No trismus in the jaw. No uvula swelling. Posterior oropharyngeal erythema present. No oropharyngeal " exudate or posterior oropharyngeal edema.   Eyes: Conjunctivae and lids are normal.   Neck: Trachea normal and phonation normal. Neck supple. No edema present. No erythema present.   Cardiovascular: Normal rate, regular rhythm, normal heart sounds and normal pulses.   Pulmonary/Chest: Effort normal and breath sounds normal. No respiratory distress. She has no decreased breath sounds. She has no wheezes. She has no rhonchi.   Abdominal: Normal appearance.   Musculoskeletal: Normal range of motion.         General: Normal range of motion.   Lymphadenopathy:     She has no cervical adenopathy.   Neurological: She is alert and oriented to person, place, and time. She exhibits normal muscle tone.   Skin: Skin is warm, dry, intact and not diaphoretic.   Psychiatric: Her speech is normal and behavior is normal.   Nursing note and vitals reviewed.  Results for orders placed or performed in visit on 06/11/23   SARS Coronavirus 2 Antigen, POCT Manual Read   Result Value Ref Range    SARS Coronavirus 2 Antigen Positive (A) Negative     Acceptable Yes          Patient in no acute distress.  Vitals reassuring.  Discussed results/diagnosis/plan in depth with patient in clinic. Strict precautions given to patient to monitor for worsening signs and symptoms. Advised to follow up with primary.All questions answered. Strict ER precautions given. If your symptoms worsens or fail to improve you should go to the Emergency Room. Discharge and follow-up instructions given verbally/printed. Discharge and follow-up instructions discussed with the patient who expressed understanding and willingness to comply with my recommendations.Patient voiced understanding and in agreement with current treatment plan.     Please be advised this text was dictated with Prized software and may contain errors due to translation.    Assessment:     1. COVID-19    2. Tachycardia        Plan:       COVID-19  -     SARS Coronavirus 2 Antigen,  POCT Manual Read    Tachycardia  -     IN OFFICE EKG 12-LEAD (to [place])    Other orders  -     benzonatate (TESSALON) 100 MG capsule; Take 1 capsule (100 mg total) by mouth 3 (three) times daily as needed for Cough.  Dispense: 30 capsule; Refill: 0        Medical Decision Making:   Independently Interpreted Test(s):   I have ordered and independently interpreted EKG Reading(s) - see summary below       <> Summary of EKG Reading(s): Sinus tachycardia with rate of 135, possible left atrial enlargement, left axis deviation; no significant changes from previous EKG; last EKG with sinus tachycardia  Urgent Care Management:  Pt in no acute distress. Tachycardia noted. EKG with sinus tachycardia, reviewed EKG findings as above. Reviewed positive COVID test results in detail with pt. Paxlovid treatment discussed in detail including EUA and side effects. Pt deferred. Discussed OTC medications and prescription sent for Tessalon Perles.  Discussed the importance of further evaluation if symptoms worsen. Patient stated verbal understanding.     Patient Instructions   You have tested positive for COVID-19 today.      ISOLATION  If you tested positive and do not have symptoms, you must isolate for 5 days starting on the day of the positive test. I    If you tested positive and have symptoms, you must isolate for 5 days starting on the day of the first symptoms,  not the day of the positive test.     Both the CDC and the [facility] emphasize that you do not test out of isolation.     After 5 days, if your symptoms have improved and you have not had fever on day 5, you can return to the community on day 6- NO TESTING REQUIRED!      In fact, we do not retest if you were positive in the last 90 days.    After your 5 days of isolation are completed, the CDC recommends strict mask use for the first 5 days that you come out of isolation.    Patient Instructions   PLEASE READ YOUR DISCHARGE INSTRUCTIONS ENTIRELY AS IT CONTAINS IMPORTANT  INFORMATION.     Please drink plenty of fluids.     Please get plenty of rest.     Please return here or go to the Emergency Department for any concerns or worsening of condition.     Please take an over the counter antihistamine medication (allegra/Claritin/Zyrtec) of your choice as directed.     Try an over the counter decongestant like Mucinex D or Sudafed. You buy this behind the pharmacy counter     If you do have Hypertension or palpitations, it is safe to take Coricidin HBP for relief of sinus symptoms.     If not allergic, please take over the counter Tylenol (Acetaminophen) and/or Motrin (Ibuprofen) as directed for control of pain and/or fever.  Please follow up with your primary care doctor or specialist as needed.     Sore throat recommendations: Warm fluids, warm salt water gargles, throat lozenges, tea, honey, soup, rest, hydration.     Use over the counter flonase: one spray each nostril twice daily OR two sprays each nostril once daily.      If you  smoke, please stop smoking.     Please return or see your primary care doctor if you develop new or worsening symptoms.      Please arrange follow up with your primary medical clinic as soon as possible. You must understand that you've received an Urgent Care treatment only and that you may be released before all of your medical problems are known or treated. You, the patient, will arrange for follow up as instructed. If your symptoms worsen or fail to improve you should go to the Emergency Room.

## 2023-06-12 ENCOUNTER — PATIENT MESSAGE (OUTPATIENT)
Dept: RESEARCH | Facility: HOSPITAL | Age: 53
End: 2023-06-12
Payer: MEDICARE

## 2023-06-14 ENCOUNTER — OFFICE VISIT (OUTPATIENT)
Dept: HEMATOLOGY/ONCOLOGY | Facility: CLINIC | Age: 53
End: 2023-06-14
Payer: MEDICARE

## 2023-06-14 DIAGNOSIS — E79.0 HYPERURICEMIA: ICD-10-CM

## 2023-06-14 DIAGNOSIS — I30.1 ACUTE VIRAL PERICARDITIS: ICD-10-CM

## 2023-06-14 DIAGNOSIS — D50.9 MICROCYTIC ANEMIA: ICD-10-CM

## 2023-06-14 DIAGNOSIS — E11.9 TYPE 2 DIABETES MELLITUS WITHOUT COMPLICATION, WITHOUT LONG-TERM CURRENT USE OF INSULIN: ICD-10-CM

## 2023-06-14 DIAGNOSIS — D50.0 IRON DEFICIENCY ANEMIA DUE TO CHRONIC BLOOD LOSS: ICD-10-CM

## 2023-06-14 DIAGNOSIS — D75.839 THROMBOCYTOSIS: Primary | ICD-10-CM

## 2023-06-14 DIAGNOSIS — E78.00 ELEVATED CHOLESTEROL: ICD-10-CM

## 2023-06-14 DIAGNOSIS — U07.1 COVID: ICD-10-CM

## 2023-06-14 DIAGNOSIS — Z80.9 FAMILY HISTORY OF CANCER: ICD-10-CM

## 2023-06-14 DIAGNOSIS — K50.00 CROHN'S DISEASE OF SMALL INTESTINE WITHOUT COMPLICATION: ICD-10-CM

## 2023-06-14 PROCEDURE — 1160F RVW MEDS BY RX/DR IN RCRD: CPT | Mod: HCWC,CPTII,95, | Performed by: NURSE PRACTITIONER

## 2023-06-14 PROCEDURE — 1160F PR REVIEW ALL MEDS BY PRESCRIBER/CLIN PHARMACIST DOCUMENTED: ICD-10-PCS | Mod: HCWC,CPTII,95, | Performed by: NURSE PRACTITIONER

## 2023-06-14 PROCEDURE — 1159F MED LIST DOCD IN RCRD: CPT | Mod: HCWC,CPTII,95, | Performed by: NURSE PRACTITIONER

## 2023-06-14 PROCEDURE — 99215 PR OFFICE/OUTPT VISIT, EST, LEVL V, 40-54 MIN: ICD-10-PCS | Mod: HCWC,95,, | Performed by: NURSE PRACTITIONER

## 2023-06-14 PROCEDURE — 99499 RISK ADDL DX/OHS AUDIT: ICD-10-PCS | Mod: HCWC,95,, | Performed by: NURSE PRACTITIONER

## 2023-06-14 PROCEDURE — 99499 UNLISTED E&M SERVICE: CPT | Mod: HCWC,95,, | Performed by: NURSE PRACTITIONER

## 2023-06-14 PROCEDURE — 99215 OFFICE O/P EST HI 40 MIN: CPT | Mod: HCWC,95,, | Performed by: NURSE PRACTITIONER

## 2023-06-14 PROCEDURE — 1159F PR MEDICATION LIST DOCUMENTED IN MEDICAL RECORD: ICD-10-PCS | Mod: HCWC,CPTII,95, | Performed by: NURSE PRACTITIONER

## 2023-06-14 RX ORDER — HEPARIN 100 UNIT/ML
500 SYRINGE INTRAVENOUS
OUTPATIENT
Start: 2023-07-05

## 2023-06-14 RX ORDER — SODIUM CHLORIDE 0.9 % (FLUSH) 0.9 %
10 SYRINGE (ML) INJECTION
OUTPATIENT
Start: 2023-07-12

## 2023-06-14 RX ORDER — SODIUM CHLORIDE 0.9 % (FLUSH) 0.9 %
10 SYRINGE (ML) INJECTION
OUTPATIENT
Start: 2023-07-05

## 2023-06-14 RX ORDER — HEPARIN 100 UNIT/ML
500 SYRINGE INTRAVENOUS
OUTPATIENT
Start: 2023-06-28

## 2023-06-14 RX ORDER — HEPARIN 100 UNIT/ML
500 SYRINGE INTRAVENOUS
OUTPATIENT
Start: 2023-06-21

## 2023-06-14 RX ORDER — SODIUM CHLORIDE 0.9 % (FLUSH) 0.9 %
10 SYRINGE (ML) INJECTION
OUTPATIENT
Start: 2023-06-28

## 2023-06-14 RX ORDER — HEPARIN 100 UNIT/ML
500 SYRINGE INTRAVENOUS
OUTPATIENT
Start: 2023-07-12

## 2023-06-14 RX ORDER — SODIUM CHLORIDE 0.9 % (FLUSH) 0.9 %
10 SYRINGE (ML) INJECTION
OUTPATIENT
Start: 2023-06-21

## 2023-06-14 NOTE — PROGRESS NOTES
Patient ID: Giovanna Medina is a 53 y.o. female.    Chief Complaint: Thrombocytosis, Iron deficiency anemia    The patient location is: car  The chief complaint leading to consultation is: thrombocytosis, iron deficiency    Visit type: audiovisual    Face to Face time with patient: 20  30 minutes of total time spent on the encounter, which includes face to face time and non-face to face time preparing to see the patient (eg, review of tests), Obtaining and/or reviewing separately obtained history, Documenting clinical information in the electronic or other health record, Independently interpreting results (not separately reported) and communicating results to the patient/family/caregiver, or Care coordination (not separately reported).         Each patient to whom he or she provides medical services by telemedicine is:  (1) informed of the relationship between the physician and patient and the respective role of any other health care provider with respect to management of the patient; and (2) notified that he or she may decline to receive medical services by telemedicine and may withdraw from such care at any time.    Notes:         History     HPI    Giovanna Medina is a 52yr old female referred to hematology for evaluation of thrombocytosis by personal referral. Daughter being seen for ALMA by Dr CHRISTIANO Macdonald in this clinic and pt wanted to be seen by same group. Comorbidities include diabetes, sees optho for concern of glaucoma, sacroiliac arthritis (diagnosis age 21yrs), vertigo, hypercholesterolemia, has history of viral induced pericarditis in 12/2021 and has followed pulmonology and cardiology for this.  Pt states A1c improved to 5.9 from 7.9 with intermittent metformin usage, weight and diet management which she is continuing to work on. She is currently not on pravastatin for cholesterol stating her cardiologist is giving her a chance as well with weight and diet management. Currently has completed follow up  "with pulmonology, on  [facility]ly visits with cardiology at [facility]. Pt did complete a screening colonoscopy today 22 which per pt report she has "small ulcers in colon, they said they don't think its cancer but sending specimens to check". Will follow up on this. Pt was on colchicine and TID ibuprofen for  Pericarditis, discontinued 2022 after DUB. She is on a daily baby asa since pericarditis diagnosis. Pericarditis etiology was suspected as viral illness exposure with grandchildren. Covid diagnosis 2021.  Concern for nodule in long on CXR 3/2022 was clarified as old consolidation, improved, on recommended CT chest with cardiology and pulmonology chart review as well as radiography results [place] [facility] [place]. Pericarditis was noted as resolved, EF noted as normal.     Family history includes brother  with history of leukemia but death related to medication response/cardiac arrest; one brother had a nasal cancer with surgical/radiation/chemo management and is currently living; another brother  due to leukemia; another brother  after a type of abdominal cancer,  mother  with CHF complications and aneurysm to heart age 60y, father  due to trauma complications in his mid 30s. Pt's daughter has ALMA.    Pt denies fatigue, flushing, itching, chest pain, sob/carter, leg swelling, n/v/d, abdominal pain, abdominal swelling, hemoptysis, hematemesis, melena, epistaxis, easy bleeding. She has had some intentional weight loss with trying to improve diabetes and cholesterol management.     INTERVAL  -here for thrombocytosis and iron deficiency follow up  -history of uterine biopsy negative 2022  -GI colonoscopy 22 with ulcers, negative biopsy, repeat colonoscopy 22 with repeat biopsies diverticulosis sigmoid, crohn's disease with ileitis and colitis, severe inflammation, she has not had any notable gi blood loss  -reports fatigue and " post nasal drainage/uri symptoms, getting over covid, denies flushing, chest pain, sob, abdominal pain, n/v/d, hematemesis, melena, weight loss, nightsweats  -remains on ozempic for diabetes and weight and states trying to find time to start exercising again  -she has reduced shellfish intake from daily, no etoh use  -reports she is not able to remain compliant with iron    Past medical, surgical, and medication history, past family history reviewed and uptdated with patient today as noted.      Past Medical History:   Diagnosis Date    Benign paroxysmal vertigo, bilateral     Diabetes mellitus, type 2     Glaucoma suspect of both eyes     History of pericarditis 2021    Hypercholesterolemia     Optic atrophy, unspecified        Past Surgical History:   Procedure Laterality Date    BRAIN SURGERY  2005     SECTION, CLASSIC      COLONOSCOPY N/A 2022    Procedure: COLONOSCOPY Suprep;  Surgeon: Vishal Mariee MD;  Location: [facility] ENDO;  Service: Endoscopy;  Laterality: N/A;    COLONOSCOPY N/A 2022    Procedure: COLONOSCOPY;  Surgeon: Vishal Mariee MD;  Location: [facility];  Service: Endoscopy;  Laterality: N/A;    TUBAL LIGATION         Oncology History:  Oncology History    No history exists.        Review of Systems:  Review of Systems   Constitutional:  Positive for fatigue. Negative for activity change, appetite change, chills, fever and unexpected weight change.   HENT:  Positive for nasal congestion (improving, has Covid, day 5 today) and postnasal drip (improving, has Covid, day 5 today). Negative for mouth sores and nosebleeds.    Eyes:  Negative for photophobia and visual disturbance.   Respiratory:  Negative for chest tightness and shortness of breath.    Cardiovascular:  Negative for chest pain, palpitations and leg swelling.   Gastrointestinal:  Negative for abdominal pain, blood in stool, change in bowel habit, constipation, diarrhea, nausea, vomiting and change in bowel  habit.   Genitourinary:  Negative for hematuria and menstrual problem.        Postmenopausal   Musculoskeletal:  Negative for gait problem, joint swelling and myalgias.   Integumentary:  Negative for pallor and rash.   Neurological:  Negative for dizziness, syncope and weakness.   Hematological:  Negative for adenopathy. Does not bruise/bleed easily.   Psychiatric/Behavioral:  Negative for sleep disturbance. The patient is not nervous/anxious.         Physical Exam   Vitals:  LMP  (LMP Unknown)     PE    Pt in car riding with daughter and grandchildren, talkative with no sob. Alert, nontoxic.     Labs:     Lab Results   Component Value Date    WBC 8.39 06/05/2023    HGB 13.2 06/05/2023    HCT 41.4 06/05/2023    MCV 84 06/05/2023     (H) 06/05/2023       Lab Results   Component Value Date    IRON 34 06/05/2023    TRANSFERRIN 238 06/05/2023    TIBC 352 06/05/2023    FESATURATED 10 (L) 06/05/2023      Lab Results   Component Value Date    FERRITIN 103 06/05/2023     T sat 9.66% c/w ALMA          EGD, colonoscopy 12/7/22 with Dr Mariee  Impression:    - Diverticulosis in the sigmoid colon.                          - Crohn's disease with ileitis and colitis.                          Inflammation was found. This was severe. Biopsied.                          - Crohn's disease, with ileitis and colitis.                          Inflammation was found. This was severe, unchanged                          compared to previous examinations. Biopsied.                          - A few erosions in the sigmoid colon. Biopsied.                          - The examination was otherwise normal on direct                          and retroflexion views.                          Exam today( off all nsaids) shows at least similar                          if not worse inflammatory changes mainly in the TI                          and cecum / IC valve, prox ascending colon. There                          is possible tiny pinhole  fistulous tract near the                          IC valve, and there may be more upstream TI                          stricturing, could not traverse above about 15 cm                          in the TI, but this could be from looping as well.                          This is most consistent with Crohns disease, with                          significant activity, and I think she warrants                          therapy.       CT CHEST WITHOUT CONTRAST 3/18/22  IMPRESSION:     PATCHY SUBPLEURAL AIRSPACE CONSOLIDATION IN THE POSTERIOR RIGHT LOWER LOBE CORRESPONDS TO THE ABNORMALITY SEEN ON RECENT CHEST X-RAY. THIS MAY REPRESENT RESOLVING ATELECTASIS, PNEUMONIA, ETC. THIS APPEARANCE HAS SIGNIFICANTLY IMPROVED COMPARED TO CT SCAN OF CHEST DATED 12/04/2021.     Electronically Signed By: Elian Turner MD 3/18/2022 11:23 AM CDT      TRANSTHORACIC ECHOCARDIOGRAM 3/8/22  Interpretation Summary   Ejection Fraction = 60-65%.   No regional wall motion abnormalities noted.   The left ventricular size, thickness and function are normal   There is no left ventricular diastolic dysfunction.   normal filling pressures   There is no pericardial effusion.   Previous echo 12/2021 had moderate pericardial effusion.     CHEST XRAY 3/8/22  IMPRESSION:   Possible new 1 cm pulmonary nodule lateral right lung base as described above. This was not present on 1/1/2022. Consider CT chest.          Physical Exam:  Physical Exam   Constitutional: She is oriented to person, place, and time. She appears obese.  Non-toxic appearance. No distress. She does not appear ill.   HENT:   Head: Normocephalic and atraumatic.   Mouth/Throat: Mucous membranes are moist. Oropharynx is clear.   Eyes: Conjunctivae are normal. No scleral icterus.   Cardiovascular: Normal rate, regular rhythm, normal heart sounds and normal pulses.   No murmur heard.Pulmonary:      Effort: Pulmonary effort is normal.      Breath sounds: Normal breath sounds.     Abdominal: Soft.  Bowel sounds are normal. She exhibits no distension. There is no abdominal tenderness. There is no guarding.   Musculoskeletal:      Cervical back: Normal range of motion and neck supple. No rigidity.      Right lower leg: No edema.      Left lower leg: No edema.   Neurological: She is alert and oriented to person, place, and time. Gait normal.   Skin: Skin is warm and dry. No bruising noted. She is not diaphoretic. No jaundice or pallor.   Psychiatric: Her behavior is normal. Mood normal.   Nursing note and vitals reviewed.      ECOG:   ECOG SCORE    1 - Restricted in strenuous activity-ambulatory and able to carry out work of a light nature          Discussion     Problem List:  Problem List Items Addressed This Visit          Oncology    Iron deficiency anemia due to chronic blood loss    Thrombocytosis - Primary    Microcytic anemia       GI    Crohn's disease of small intestine without complication     Other Visit Diagnoses       Hyperuricemia        Acute viral pericarditis        Type 2 diabetes mellitus without complication, without long-term current use of insulin        Elevated cholesterol        Family history of cancer        COVID                     Thrombocytosis, microcytic anemia, ALMA  - Pt with evidence thrombocytosis as early as 2009 by review in [facility]. Most recent labs are 1/2022 post pericarditis and covid illness which could also have affected platelet values.  - Neg Hep panel, RPR, TSH 1/11/2022.   - Pt denies any symptoms at present.  - She did have a screening colonoscopy today and as above, she states she was told she had small ulcers. Pending full report. If ulcers, there could be occult blood loss, consideration of ALMA which would additionally cause a reactive thrombocytosis.   - Alex 2 with reflex, BCR:ABL1, retics, hgb electrophoresis 7/2022 normal   - 10/17/22 labs with normal H/H, platelets, iron studies, continued elevation of uric acid, esr, crp but improved.   - Repeat colonoscopy  12/7/22 with biopsies, crohns concern  - 12/6/22 labs reviewed, normal H/H, mild microcytic evidence, platelets are normal. Iron studies with low iron sat otherwise normal. She will start po iron otc daily.   - Advised to reduce seafood to 1-2x/week given uric acid, if remains elevated will consider allopurinol.   - 3/10/23 labs with normal H/H, mild microcytic evidence, platelets with slight increase to 486 K/uL, iron studies stable with low iron sat otherwise normal iron level and ferritin.  - Noncompliant with oral iron, likely poor absorption as well as some chronic blood loss re gi inflammation with crohns  - 6/5/23 labs  normal hgb 13.2 g/dL, iron low normal and iron sat 10%,   T sat calculated at 9.66%. Platelets with mild elevation.  - Will benefit from iv iron, orders placed for [facility]; this may also help platelets  - RTC 4 months with labs and follow up appt      Hyperuricemia  -uric acid elevation, has now decreased to 6.4 mg/dL  -eats shellfish less frequently  -has been on colchicine in past, now stopped with GI concerns  -continue to monitor    Type 2 Diabetes  - taking ozempic as prescribed by pcp  - Management to continue per pcp     Acute viral pericarditis  - 12/2021 diagnosis, resolution noted in cardiology and pulmonology follow up with repeated cxr and CT as ntoed above  - considered likely viral related given she cares for several grandchildren, many uris with grandchildren  - cardiology follow up in 1yr as scheduled 3/2022, pulmonology prn    Elevated Cholesterol  - Pt states she is working on lifestyle changes, weight reduction   - Not currently taking pravastatin, reports this was in agreement with her cardiologist if she is successful with lifestyle changes.  - Management deferred to cardiology     Crohns small intestine without complication  - no ASA at present due to GI side effects  - no symptoms at present  - management deferred to GI/Dr Mariee    Family history of  cancer  - genetic testing referral previously     Covid  - on day 5, symptoms improving  - continue to monitor    Advanced Care Planning  - completed 7/13/22    Advanced Care Planning  Advance care planning  - After our discussion at previous visit, the patient has decided to complete a HCPOA and has appointed karson Yeager as primary at [phone], pastor Janes, at [phone] as secondary, and Nitin Vaca Sr brother at [phone] as third.       Juanita Seals, NP-C  [facility]  Hematology/Oncology  [address] [place] [address] [id number]  [id number] [phone]

## 2023-06-14 NOTE — Clinical Note
Cbc, iron/tibc and ferritin at [place] in 4 months, appt at [place] in clinic 1-2 days later. Thanks

## 2023-07-05 ENCOUNTER — PATIENT MESSAGE (OUTPATIENT)
Dept: HEMATOLOGY/ONCOLOGY | Facility: CLINIC | Age: 53
End: 2023-07-05
Payer: MEDICARE

## 2023-07-05 ENCOUNTER — TELEPHONE (OUTPATIENT)
Dept: HEMATOLOGY/ONCOLOGY | Facility: CLINIC | Age: 53
End: 2023-07-05
Payer: MEDICARE

## 2023-07-05 NOTE — TELEPHONE ENCOUNTER
Pt states she does not want to do iv iron at this time. She will take an otc iron supplement. Questions answered. Repeat iron studies approximately 6 wks.

## 2023-07-06 ENCOUNTER — OFFICE VISIT (OUTPATIENT)
Dept: GASTROENTEROLOGY | Facility: CLINIC | Age: 53
End: 2023-07-06
Payer: MEDICARE

## 2023-07-06 ENCOUNTER — CLINICAL SUPPORT (OUTPATIENT)
Dept: GASTROENTEROLOGY | Facility: CLINIC | Age: 53
End: 2023-07-06
Payer: MEDICARE

## 2023-07-06 ENCOUNTER — LAB VISIT (OUTPATIENT)
Dept: LAB | Facility: HOSPITAL | Age: 53
End: 2023-07-06
Attending: INTERNAL MEDICINE
Payer: MEDICARE

## 2023-07-06 VITALS
DIASTOLIC BLOOD PRESSURE: 87 MMHG | OXYGEN SATURATION: 100 % | TEMPERATURE: 98 F | BODY MASS INDEX: 30.23 KG/M2 | WEIGHT: 170.63 LBS | SYSTOLIC BLOOD PRESSURE: 134 MMHG | HEART RATE: 80 BPM

## 2023-07-06 DIAGNOSIS — K50.80 CROHN'S DISEASE OF BOTH SMALL AND LARGE INTESTINE WITHOUT COMPLICATION: Primary | ICD-10-CM

## 2023-07-06 DIAGNOSIS — D50.0 IRON DEFICIENCY ANEMIA DUE TO CHRONIC BLOOD LOSS: ICD-10-CM

## 2023-07-06 DIAGNOSIS — K50.80 CROHN'S DISEASE OF BOTH SMALL AND LARGE INTESTINE WITHOUT COMPLICATION: ICD-10-CM

## 2023-07-06 LAB
25(OH)D3+25(OH)D2 SERPL-MCNC: 20 NG/ML (ref 30–96)
ALBUMIN SERPL BCP-MCNC: 3.9 G/DL (ref 3.5–5.2)
ALP SERPL-CCNC: 86 U/L (ref 55–135)
ALT SERPL W/O P-5'-P-CCNC: 16 U/L (ref 10–44)
ANION GAP SERPL CALC-SCNC: 12 MMOL/L (ref 8–16)
AST SERPL-CCNC: 16 U/L (ref 10–40)
BILIRUB SERPL-MCNC: 0.3 MG/DL (ref 0.1–1)
BUN SERPL-MCNC: 9 MG/DL (ref 6–20)
CALCIUM SERPL-MCNC: 10.3 MG/DL (ref 8.7–10.5)
CHLORIDE SERPL-SCNC: 104 MMOL/L (ref 95–110)
CO2 SERPL-SCNC: 26 MMOL/L (ref 23–29)
CREAT SERPL-MCNC: 0.8 MG/DL (ref 0.5–1.4)
CRP SERPL-MCNC: 29.9 MG/L (ref 0–8.2)
EST. GFR  (NO RACE VARIABLE): >60 ML/MIN/1.73 M^2
GLUCOSE SERPL-MCNC: 86 MG/DL (ref 70–110)
HAV IGG SER QL IA: NORMAL
HBV CORE AB SERPL QL IA: NORMAL
HBV SURFACE AG SERPL QL IA: NORMAL
HCV AB SERPL QL IA: NORMAL
POTASSIUM SERPL-SCNC: 4.1 MMOL/L (ref 3.5–5.1)
PROT SERPL-MCNC: 8.9 G/DL (ref 6–8.4)
SODIUM SERPL-SCNC: 142 MMOL/L (ref 136–145)
VIT B12 SERPL-MCNC: 432 PG/ML (ref 210–950)

## 2023-07-06 PROCEDURE — 82306 VITAMIN D 25 HYDROXY: CPT | Mod: HCWC | Performed by: INTERNAL MEDICINE

## 2023-07-06 PROCEDURE — 86765 RUBEOLA ANTIBODY: CPT | Mod: HCWC | Performed by: INTERNAL MEDICINE

## 2023-07-06 PROCEDURE — 99215 OFFICE O/P EST HI 40 MIN: CPT | Mod: HCWC,S$GLB,, | Performed by: INTERNAL MEDICINE

## 2023-07-06 PROCEDURE — 86480 TB TEST CELL IMMUN MEASURE: CPT | Mod: HCWC | Performed by: INTERNAL MEDICINE

## 2023-07-06 PROCEDURE — 3075F PR MOST RECENT SYSTOLIC BLOOD PRESS GE 130-139MM HG: ICD-10-PCS | Mod: HCWC,CPTII,S$GLB, | Performed by: INTERNAL MEDICINE

## 2023-07-06 PROCEDURE — 3008F PR BODY MASS INDEX (BMI) DOCUMENTED: ICD-10-PCS | Mod: HCWC,CPTII,S$GLB, | Performed by: INTERNAL MEDICINE

## 2023-07-06 PROCEDURE — 3075F SYST BP GE 130 - 139MM HG: CPT | Mod: HCWC,CPTII,S$GLB, | Performed by: INTERNAL MEDICINE

## 2023-07-06 PROCEDURE — 3079F DIAST BP 80-89 MM HG: CPT | Mod: HCWC,CPTII,S$GLB, | Performed by: INTERNAL MEDICINE

## 2023-07-06 PROCEDURE — 86140 C-REACTIVE PROTEIN: CPT | Mod: HCWC | Performed by: INTERNAL MEDICINE

## 2023-07-06 PROCEDURE — 1159F PR MEDICATION LIST DOCUMENTED IN MEDICAL RECORD: ICD-10-PCS | Mod: HCWC,CPTII,S$GLB, | Performed by: INTERNAL MEDICINE

## 2023-07-06 PROCEDURE — 80053 COMPREHEN METABOLIC PANEL: CPT | Mod: HCWC | Performed by: INTERNAL MEDICINE

## 2023-07-06 PROCEDURE — 1160F RVW MEDS BY RX/DR IN RCRD: CPT | Mod: HCWC,CPTII,S$GLB, | Performed by: INTERNAL MEDICINE

## 2023-07-06 PROCEDURE — 86790 VIRUS ANTIBODY NOS: CPT | Mod: HCWC | Performed by: INTERNAL MEDICINE

## 2023-07-06 PROCEDURE — 86735 MUMPS ANTIBODY: CPT | Mod: HCWC | Performed by: INTERNAL MEDICINE

## 2023-07-06 PROCEDURE — 86704 HEP B CORE ANTIBODY TOTAL: CPT | Mod: HCWC | Performed by: INTERNAL MEDICINE

## 2023-07-06 PROCEDURE — 86706 HEP B SURFACE ANTIBODY: CPT | Mod: HCWC | Performed by: INTERNAL MEDICINE

## 2023-07-06 PROCEDURE — 99215 PR OFFICE/OUTPT VISIT, EST, LEVL V, 40-54 MIN: ICD-10-PCS | Mod: HCWC,S$GLB,, | Performed by: INTERNAL MEDICINE

## 2023-07-06 PROCEDURE — 86803 HEPATITIS C AB TEST: CPT | Mod: HCWC | Performed by: INTERNAL MEDICINE

## 2023-07-06 PROCEDURE — 3008F BODY MASS INDEX DOCD: CPT | Mod: HCWC,CPTII,S$GLB, | Performed by: INTERNAL MEDICINE

## 2023-07-06 PROCEDURE — 1160F PR REVIEW ALL MEDS BY PRESCRIBER/CLIN PHARMACIST DOCUMENTED: ICD-10-PCS | Mod: HCWC,CPTII,S$GLB, | Performed by: INTERNAL MEDICINE

## 2023-07-06 PROCEDURE — 3079F PR MOST RECENT DIASTOLIC BLOOD PRESSURE 80-89 MM HG: ICD-10-PCS | Mod: HCWC,CPTII,S$GLB, | Performed by: INTERNAL MEDICINE

## 2023-07-06 PROCEDURE — 86762 RUBELLA ANTIBODY: CPT | Mod: HCWC | Performed by: INTERNAL MEDICINE

## 2023-07-06 PROCEDURE — 87340 HEPATITIS B SURFACE AG IA: CPT | Mod: HCWC | Performed by: INTERNAL MEDICINE

## 2023-07-06 PROCEDURE — 82607 VITAMIN B-12: CPT | Mod: HCWC | Performed by: INTERNAL MEDICINE

## 2023-07-06 PROCEDURE — 86787 VARICELLA-ZOSTER ANTIBODY: CPT | Mod: HCWC | Performed by: INTERNAL MEDICINE

## 2023-07-06 PROCEDURE — 1159F MED LIST DOCD IN RCRD: CPT | Mod: HCWC,CPTII,S$GLB, | Performed by: INTERNAL MEDICINE

## 2023-07-06 RX ORDER — LANOLIN ALCOHOL/MO/W.PET/CERES
1 CREAM (GRAM) TOPICAL
COMMUNITY

## 2023-07-06 RX ORDER — WHEAT DEXTRIN 3 G/3.5 G
1 POWDER IN PACKET (EA) ORAL
COMMUNITY

## 2023-07-06 NOTE — PATIENT INSTRUCTIONS
Ustekinumab (Stelara)    Class: Interleukin 12 and Interleukin 23 Blocker - Biologic product    Mechanism of Action: blocks the p40 protein subunit used by both the interleukin (IL)-12 and IL-23 cytokines. IL-12 and IL-23 are naturally occurring cytokines that are involved in inflammatory and immune responses and play a role in the inflammatory process for Inflammatory Bowel Disease (IBD).    Other indications: Psoriatic Arthritis, Plaque Psoriasis.    Dosage/ Route/ Frequency: One intravenous (IV) infusion and then inject under the skin in the subcutaneous tissue every 8 weeks.  - Loading Dose: Initial 1 hour IV infusion based on your weight at an infusion clinic   - Maintenance Dose: 90 mg SC every 8 weeks that can be done at home (first injection dose is 8 weeks after initial infusion)    Side effects  Please notify us if you are treated with antibiotics or experience signs of infection (ie. fevers, chills, sores, etc) given we may need to hold your medication during this time.     Common side effects (?3% or 3 in 100 people): vomiting (with the first infusion), nasopharyngitis (sore throat, runny nose), injection site redness, vaginal infection, urinary tract infection     Please call us immediately if you develop any of the below problems:    Serious side effects:     Serious infections: This medication may increase your risk of developing viral, bacterial and/or fungal infections.     Allergic or Hypersensitivity reaction- hives (rash), difficulty breathing, chest pain/tightness, high or low blood pressure, swelling of the face and hands, fever or chills    Malignancies: This medication may increase your risk of skin cancer, yearly skin checks are recommended. Make sure you are using sun block and protective wear.    Posterior reversible encephalopathy syndrome (PRES) has been reported. Symptoms of PRES include confusion, headache, imaging changes, seizure, and visual disturbances; most cases occur within a  few days to several months after initiation of therapy but may occur ?1 year. Monitor patients closely; discontinue therapy immediately if symptoms occur and administer appropriate therapy.    Lung inflammation: post marketing rare side effect reported. Happens within the first three doses of the medication and it can improve once the medication is stopped. Symptoms include shortness of breath or cough that do not go away.     Labs/Monitoring:  Prior to starting this new agent, we will be checking labs to determine the safety of taking this medication including baseline blood counts, liver and kidney function tests, TB test and viral hepatitis testing.   Once started on the therapy we will monitor your blood count and your liver and kidney function tests as needed following evidence-based guidelines. TB test and viral hepatitis tests will be repeated every year. If you have any risk of exposure or travel to high-risk areas please notify us so we can do this testing sooner. If indicated your provider may also choose to check drug levels to monitor or optimize your response to the medication.   We recommend you do not start the home injection on a Sunday for lab purposes.     Storage recommendations:  Keep refrigerated in temperature between 36°F to 46°F (2°C to 8°C).  If necessary, can be in room temperature (86°F or 30°C) for max of 30 days in the original carton protected from light  If taken out of the fridge and left in room temperature do not put back in the fridge   Do NOT shake and do NOT freeze     Vaccine counseling:   Avoid live vaccines which include:  Intranasal Influenza LAIV4 (FluMist Quadrivalent)  Measles, Mumps, Rubella (MMR)  Rotavirus (RotaTeq, Rotarix)  Typhoid oral capsule (Vivotif)  Varicella (Varivax)  Smallpox (Vaccinia)  Yellow Fever (YF-Vax)  Adenovirus  Cholera (Vaxchora)    Avoid consumption of raw seafood such as oysters/sushi.

## 2023-07-06 NOTE — Clinical Note
We are sending [facility] to [facility] Can you please fill out the form and have Dr. Marie sign so we can send the referral. Thank you!

## 2023-07-06 NOTE — PROGRESS NOTES
[facility]          Inflammatory Bowel Disease New Patient Consultation Note         TODAY'S VISIT DATE:  7/6/2023    Reason for Consult:    Chief Complaint   Patient presents with    Crohn's Disease       PCP: Carmina Cordova      Referring MD:   Dr. Vishal Mariee    History of Present Illness:  Giovanna Medina who is a 53 y.o. female is being seen today at the [facility] on 07/06/2023 for inflammatory bowel disease- Crohn's disease.  This is my 1st time seeing her.  She was diagnosed with Crohn's disease last year.  Is noted below.  She reports that she typically has 1-3 bowel movements a day.  They are all formed.  She does not have any blood in her stools now but she did have some blood in her stools when she was taking NSAIDs for her wrist issues.  She reports occasional abdominal pain that is relieved by passing gas.  She does not smoke cigarettes and denies any family history of Crohn's disease.  She is not currently on any treatment for Crohn's disease.  She is no longer using NSAIDs.  She does report that her bowel habits have changed somewhat over the last few years.  She used to only have 1 bowel movement a day and now will sometimes have more frequent bowel movements throughout the day.    IBD History:  In July of 2022 she underwent a screening colonoscopy was found to have ulceration in the ascending colon, cecum, and in the terminal ileum.  Biopsy showed chronic inflammatory changes.  At that time it was noted that she was taking a significant amount of NSAIDs.  Inflammatory markers checked during that time revealed elevated CRP and calprotectin in the 300s.  There was concern for possible Crohn's disease but given the ongoing NSAID use there was suspicion that this might be the underlying issue.  She stopped NSAIDs and had a repeat colonoscopy in December of 2022.  At that time she again had ulceration in the ascending colon and  cecum as well as in the terminal ileum.  In addition to that her colonoscopy pictures suggest that maybe there was developing stenosis at the ileocecal valve and in the ascending colon at the level of the ileocecal valve.    IBD Details:  Dx Date:  July 2022  Disease type/distribution:  Crohn's disease/ileum, cecum, ascending colon involvement  Current Treatment:  None Start Date:  Response:   Optimized:   Adverse reactions:   Prior surgeries:  None  CRP Elevation: Y   calprotectin:  Elevated with active disease  Disease Complications:  Asymptomatic strictures  Extraintestinal manifestations:  Possible joint pains  Prior treatments:   Steroids:  None  5ASA:  None  IMM:  None  TNF Inh:  None   Anti-Integrin:  None   IL 12/23:  None  KASEY Inh:  None    Previous Clinical Trials:  None    Last Colonoscopy:  December 2022-ulceration in the ascending colon, cecum, and terminal ileum; fibrotic changes in the ascending colon and at the ileocecal valve    Other Endoscopies:  None    Imaging:   MRE:  None   CT:  December 2022- Inflammatory changes ileum and right colon but no stricturing or obstructing issues   Other:  No other pertinent imaging    Pertinent Labs:  Lab Results   Component Value Date    SEDRATE 41 (H) 12/06/2022    CRP 54.9 (H) 12/06/2022     No results found for: TTGIGA, IGA  Lab Results   Component Value Date    TSH 0.480 01/11/2022     Lab Results   Component Value Date    EUBRNTWQ24 506 12/06/2022     Lab Results   Component Value Date    HEPBSAG Negative 01/11/2022    HEPBCAB Negative 08/01/2022    HEPCAB Negative 01/11/2022     Lab Results   Component Value Date    UHN94JAWS Negative 01/11/2022     Lab Results   Component Value Date    NIL 0.09913 08/01/2022    MITOGENNIL 9.856 08/01/2022     Lab Results   Component Value Date    TPTMINTERP SEE BELOW 08/01/2022     No results found for: STOOLCULTURE, JWRVSMIXKE1C, OQNSWLFKWS8C, CDIFFICILEAN, CDIFFTOX, CDIFFICILEBY  Lab Results   Component Value Date     CALPROTECTIN 389.8 (H) 2022       Therapeutic Drug Monitoring Labs:  No results found for: PROMETH  No results found for: ANSADAINIT, INFLIXIMAB, INFLIXINTERP    Vaccinations:  No results found for: HEPBSAB  No results found for: HEPAIGG  No results found for: VARICELLAZOS, VARICELLAINT    There is no immunization history on file for this patient.      Review of Systems  Review of Systems   Constitutional:  Negative for chills, fever and weight loss.   HENT:  Negative for sore throat.    Eyes:  Positive for blurred vision. Negative for pain, discharge and redness.   Respiratory:  Negative for cough, shortness of breath and wheezing.    Cardiovascular:  Negative for chest pain, orthopnea and leg swelling.   Gastrointestinal:  Negative for abdominal pain, blood in stool, constipation, diarrhea, heartburn, melena, nausea and vomiting.   Genitourinary:  Negative for dysuria, frequency and urgency.   Musculoskeletal:  Negative for back pain, joint pain and myalgias.   Skin:  Negative for itching and rash.   Neurological:  Negative for focal weakness and seizures.   Endo/Heme/Allergies:  Does not bruise/bleed easily.   Psychiatric/Behavioral:  Negative for depression. The patient is not nervous/anxious.      Medical History:   Past Medical History:   Diagnosis Date    Arthritis     Benign paroxysmal vertigo, bilateral     Crohn's disease     Diabetes mellitus, type 2     Glaucoma suspect of both eyes     History of pericarditis 2021    Hypercholesterolemia     Optic atrophy, unspecified        Surgical History:  Past Surgical History:   Procedure Laterality Date    BRAIN SURGERY  2005     SECTION, CLASSIC      COLONOSCOPY N/A 2022    Procedure: COLONOSCOPY Suprep;  Surgeon: Vishal Mariee MD;  Location: [facility];  Service: Endoscopy;  Laterality: N/A;    COLONOSCOPY N/A 2022    Procedure: COLONOSCOPY;  Surgeon: Vishal Mariee MD;  Location: [facility];  Service: Endoscopy;  Laterality:  N/A;    TUBAL LIGATION         Family History:   Family History   Problem Relation Age of Onset    Diabetes Mother     Cataracts Mother     Glaucoma Mother     Stroke Mother     Heart disease Mother         CHF and aorta aneurysm complications at death    No Known Problems Father         trauma led to death at mid 30s    Diabetes Brother         3 brothers , x1 leukemia, x1 leukemia but had cardiac arrest after medication given, was on hospice; x1 abdominal cancer but uncertain type    Breast cancer Maternal Grandmother     Blindness Neg Hx     Macular degeneration Neg Hx     Retinal detachment Neg Hx     Colon cancer Neg Hx     Ovarian cancer Neg Hx        Social History:   Social History     Tobacco Use    Smoking status: Never    Smokeless tobacco: Never   Substance Use Topics    Alcohol use: No    Drug use: No       Allergies: Reviewed    Home Medications:   Medication List with Changes/Refills   Current Medications    ASPIRIN 81 MG CHEW    Take 81 mg by mouth once daily.    ATROPINE 1% (ISOPTO ATROPINE) 1 % DROP    Place 1 drop into both eyes daily as needed (eye pain).    BD ALCOHOL SWABS PADM        BETA-CAROTENE,A,-VITS C AND E (ANTIOXIDANT ORAL)    Take 1 tablet by mouth. Unsure on brand, white bottle    COMBIGAN 0.2-0.5 % DROP    Place 1 drop into both eyes 2 (two) times a day.    DICLOFENAC SODIUM (VOLTAREN) 1 % GEL    Apply 2 g topically 3 (three) times daily.    FERROUS SULFATE (FEOSOL) TAB TABLET    Take 1 tablet by mouth daily with breakfast. Elemental QD for 6 weeks if iron improved will stop infusions, not started yet    KETOROLAC 0.5% (ACULAR) 0.5 % DROP    Place 1 drop into the left eye 3 (three) times daily.    LACTOBACILLUS RHAMNOSUS GG (CULTURELLE) 10 BILLION CELL CAPSULE    Take 1 capsule by mouth once daily.    LORATADINE (CLARITIN) 10 MG TABLET    Take 1 tablet (10 mg total) by mouth once daily.    MECLIZINE (ANTIVERT) 12.5 MG TABLET    Take 1 tablet by mouth daily as needed.     OZEMPIC 0.25 MG OR 0.5 MG (2 MG/3 ML) PEN INJECTOR        PRAVASTATIN (PRAVACHOL) 40 MG TABLET        TRUE METRIX GLUCOSE TEST STRIP STRP        WHEAT DEXTRIN (BENEFIBER CLEAR SF, DEXTRIN,) 3 GRAM/3.5 GRAM PWPK    Take 1 Dose by mouth. Per package   Discontinued Medications    METFORMIN (GLUCOPHAGE) 500 MG TABLET           Physical Exam:  Vital Signs:  /87 (BP Location: Right arm, Patient Position: Sitting)   Pulse 80   Temp 98.1 °F (36.7 °C) (Temporal)   Wt 77.4 kg (170 lb 10.2 oz)   LMP  (LMP Unknown)   SpO2 100%   BMI 30.23 kg/m²   Body mass index is 30.23 kg/m².    Physical Exam  Vitals and nursing note reviewed.   Constitutional:       General: She is not in acute distress.     Appearance: Normal appearance. She is well-developed. She is not ill-appearing or toxic-appearing.   Eyes:      Conjunctiva/sclera: Conjunctivae normal.      Pupils: Pupils are equal, round, and reactive to light.   Neck:      Thyroid: No thyromegaly.   Cardiovascular:      Rate and Rhythm: Normal rate and regular rhythm.      Heart sounds: Normal heart sounds. No murmur heard.  Pulmonary:      Effort: Pulmonary effort is normal.      Breath sounds: Normal breath sounds. No wheezing or rales.   Abdominal:      General: Bowel sounds are normal. There is no distension.      Palpations: Abdomen is soft. There is no mass.      Tenderness: There is no abdominal tenderness.   Musculoskeletal:         General: No tenderness. Normal range of motion.      Right lower leg: No edema.      Left lower leg: No edema.   Lymphadenopathy:      Cervical: No cervical adenopathy.   Skin:     Findings: No erythema or rash.   Neurological:      General: No focal deficit present.      Mental Status: She is alert and oriented to person, place, and time.   Psychiatric:         Mood and Affect: Mood normal.         Behavior: Behavior normal.         Thought Content: Thought content normal.         Judgment: Judgment normal.       Labs: reviewed and  pertinent noted above    Assessment/Plan:  Giovanna Medina is a 53 y.o. female with ileocolonic Crohn's disease. The following issues were addresssed:    1. Crohn's disease of both small and large intestine without complication    2. Iron deficiency anemia due to chronic blood loss      1. Crohn's disease:  Today we had a very long discussion about her underlying Crohn's disease.  It does appear on the colonoscopy images that she has a developing stenosis at her ileocecal valve and in the ascending colon.  Thankfully these are asymptomatic and not truly obstructing at this time.  We did discuss that if we do not do anything to treat the inflammation is very likely this is going to progress over the next few years to require surgery.  We also discussed the increased risk of colon cancer associated with active Crohn's disease.  We discussed the fact that she has minimal if any symptoms but that does not necessarily being that her Crohn's disease is benign and not going to cause issues in the future.  She has markedly elevated inflammatory markers suggesting the risk for a more aggressive disease course moving forward.  We discussed long-term complications and risks of Crohn's disease and the goal of reducing the risk of these complications by treating her Crohn's disease.  We also discussed that this is a chronic condition that is likely to be a part of her life for the next few decades.  We discussed multiple treatment options today.  We discussed TNF inhibitors, Entyvio, Stelara, and Skyrizi in detail.  Risks and benefits of each of these as well as expected time to onset of action and method of administration were reviewed in detail.  After discussion today she would like to try Stelara if we can get insurance approval for this.  Given her visual impairment we may need to consider getting the injections done at an infusion center.  She will meet with our pharmacist today to discuss this in more detail and we will  plan to move forward with therapy in the near future after we update labs.    2. Iron deficiency anemia: She was offered IV iron infusions but declined.  Her iron studies show a fairly minimal iron deficiency with essentially normal ferritin, TIBC, and iron level.  I think treating her Crohn's disease is likely to have a bigger impact on her iron levels than oral supplementation at this time.       # IBD specific health maintenance:  Colon cancer surveillance:  Up-to-date    Annual:  - Eye exam:  Up-to-date  - Skin exam (if on IMM/TNF):  Discuss in the future  - reminded pt to use sunblock/hats/sunprotective clothing  - PAP (if immunosuppressed):  Discuss in the future     DEXA:  Not applicable    Vitamin D:  Check with upcoming labs    Vaccines:    Influenza:  Recommend annual vaccination   Pneumovax:  Discuss in the future   HAV:  Check serology   HBV:  Check serology   Tdap:  Review in the future   MMR:  Check serologies   VZV:  Check serologies   HZV:  Discuss in the future   HPV:  Not applicable   Meningococcus:  Not applicable    Follow up: Follow up in about 4 months (around 11/6/2023).    Thank you again for sending Giovanna Medina to see Dr. Oneil Marie today at the [facility]. Please don't hesitate to contact Dr. Marie if there are any questions regarding this evaluation, or if you have any other patients with inflammatory bowel disease for whom you would like a consultation. You can reach Dr. Marie at [phone] or by email at [email or web address]    James Marie MD  Department of Gastroenterology  Inflammatory Bowel Disease        Answers submitted by the patient for this visit:  New Patient Questionnaire  (Submitted on 7/6/2023)  Joint pain? : Yes

## 2023-07-06 NOTE — PROGRESS NOTES
[facility]  Inflammatory Bowel Disease  Pharmacy Note    TODAY'S VISIT DATE:  7/6/2023    Reason for visit: New patient     IBD treatment to be initiated/optimized: Stelara    Consent form: No    IBD MD: Frank       Pertinent History:  IBD phenotype: Crohn's disease/ileum, cecum, ascending colon involvement  Dispensing pharmacy/infusion center: [facility]   Current therapy: none  Newly diagnosed in dec 2022.   Screening colonoscopy in 7/2022 showed inflammation but was using NSAIDs daily at the time  Stopped NSAIDs and repeated scope 6 months later, still had inflammation       Therapeutic Drug Monitoring Labs:  N/A    Prior IBD Therapies:  None     Vaccinations:  No results found for: HEPBSAB  No results found for: HEPBSURFABQU  No results found for: HEPAIGG  No results found for: VARICELLAZOS, VARICELLAINT    There is no immunization history on file for this patient.  Influenza:  discussed and recommended yearly flu shot  PCV 20: discussed and recommended   Tetanus (TdaP): had one within last 10 years. Discussed and recommended booster every 10 years     Hepatitis B:  check immunity   Hepatitis A:  check immunity   MMR (live vaccine): check immunity        Chickenpox status/Varicella (live vaccine):check immunity   Shingrix: discussed and recommended shingrix 2 doses 2-6 months apart  Covid:  denied       All Medical History/Surgical History/Family History/Social History/Allergies have been reviewed and updated in EMR    Review of patient's allergies indicates:   Allergen Reactions    Codeine Anaphylaxis         Current Medications:   No outpatient medications have been marked as taking for the 7/6/23 encounter (Appointment) with IBD PHARMACIST.       Reviewed all current medications including OTC, herbals and supplements.     Labs:   Lab Results   Component Value Date    HEPBSAG Negative 01/11/2022    HEPBCAB Negative 08/01/2022     Lab Results   Component Value Date    TBGOLDPLUS Negative  08/01/2022     Lab Results   Component Value Date    BYOLIXUT61 506 12/06/2022     Lab Results   Component Value Date    WBC 8.39 06/05/2023    HGB 13.2 06/05/2023    HCT 41.4 06/05/2023    MCV 84 06/05/2023     (H) 06/05/2023     Lab Results   Component Value Date    CREATININE 0.78 03/10/2023    ALBUMIN 4.6 03/10/2023    BILITOT 0.4 03/10/2023    ALKPHOS 75 03/10/2023    AST 23 03/10/2023    ALT 18 03/10/2023         Assessment/Plan:  Giovanna Medina is a 53 y.o. female that  has a past medical history of Arthritis, Benign paroxysmal vertigo, bilateral, Crohn's disease, Diabetes mellitus, type 2, Glaucoma suspect of both eyes, History of pericarditis, Hypercholesterolemia, and Optic atrophy, unspecified. Patient presents to clinic for an initial visit with Dr. Marie to establish care with the IBD clinic.  She was recently diagnosed with Crohn's disease in December 2022. She had a routine colonoscopy in July 2022 that showed inflammation, but pt using NSAIDs regularly at the time. Advised to stop and colonoscopy repeated in December still showed inflammation. She is here today to establish care and start medical treatment. Patient prefers homeopathic options and is slightly hesitant and emotional initially. She understand the need for medical therapy and is willing to start an agent with a good safety profile. Patient is visually impaired and will require assistance with injections. She is accompanied by her daughter in clinic today.    # Recommendations:  - Educated patient on mechanism of action, frequency and route of administration, onset of action, and safety profile of Stelara  - Encouraged pt to practice proper hand hygiene considering increased risk of infection with biologics. Pt to make us aware if she ever experiences s/sx of an infection including fever, chills, URI symptoms or UTI symptoms.   - Labs CBC/CMP ; TB; Hep B to be update today. Repeat CBC CMP in 6 months, TB and Hep B yearly  - Will  sent orders to [facility] once lab results are back.  - Discussed importance of immunizations considering the increased risk of infections. Recommended PCV20 and shingrix. Will check immunity today.   - Pt to avoid live vaccines and uncooked/ raw seafood such as raw oysters or sushi.   - Advised pt to use sun protection and get annual skin checks considering possible increased risk of skin cancer   - Recommended against use of NSAIDs including motrin,ibuprofen, advil, aleve etc   - Patient verbalized understanding instructions     Madie Genao, PharmD  IBD Clinical Pharmacist

## 2023-07-06 NOTE — PROGRESS NOTES
IBD PATIENT INTAKE:    COVID symptoms in the last 7 days (runny nose, sore throat, congestion, cough, fever): No 6/11/2023 COVID +  PCP: Carmina Cordova  If not PCP-  number given to establish [phone]: No    ALLERGIES REVIEWED:  Yes    CHIEF COMPLAINT:    Chief Complaint   Patient presents with    Crohn's Disease       VITAL SIGNS:  /87 (BP Location: Right arm, Patient Position: Sitting)   Pulse 80   Temp 98.1 °F (36.7 °C) (Temporal)   Wt 77.4 kg (170 lb 10.2 oz)   LMP  (LMP Unknown)   SpO2 100%   BMI 30.23 kg/m²      Change in medical, surgical, family or social history: Yes    IBD THERAPY (name, dose/frequency):  n/a  Last dose:  n/a    Next dose:  n/a  Infusion/Pharmacy: NA    Vitamins (be sure this has been put in medication list):   Vit D:  will start soon     Vit B-12:  no   Folic Acid: no       Calcium: no     Iron:  yes infusion will change to supplement soon      MVI: no     Antibiotics (past 30 Days):  No  If yes   Indication:  Name of antibiotic:  Completion date:     REVIEWED MEDICATION LIST RECONCILED INCLUDING ABOVE MEDS:  Yes                 Answers submitted by the patient for this visit:  New Patient Questionnaire  (Submitted on 7/6/2023)  Joint pain? : Yes

## 2023-07-07 ENCOUNTER — TELEPHONE (OUTPATIENT)
Dept: GASTROENTEROLOGY | Facility: CLINIC | Age: 53
End: 2023-07-07
Payer: MEDICARE

## 2023-07-07 LAB
GAMMA INTERFERON BACKGROUND BLD IA-ACNC: 0.07 IU/ML
M TB IFN-G CD4+ BCKGRND COR BLD-ACNC: -0.02 IU/ML
M TB IFN-G CD4+ BCKGRND COR BLD-ACNC: -0.03 IU/ML
MITOGEN IGNF BCKGRD COR BLD-ACNC: 9.93 IU/ML
MUMPS IGG INTERPRETATION: POSITIVE
MUMPS IGG SCREEN: 238 AU/ML
RUBEOLA IGG ANTIBODY: 26.4 AU/ML
RUBEOLA INTERPRETATION: POSITIVE
RUBV IGG SER-ACNC: 53.5 IU/ML
RUBV IGG SER-IMP: REACTIVE
TB GOLD PLUS: NEGATIVE
VARICELLA INTERPRETATION: POSITIVE
VARICELLA ZOSTER IGG: 2333 AU/ML

## 2023-07-07 NOTE — TELEPHONE ENCOUNTER
[facility] form and supporting documents (clinical notes, labs, etc) faxed to [facility]. Successful fax transmittal e-mail received.       Fax # [phone]

## 2023-07-07 NOTE — TELEPHONE ENCOUNTER
----- Message from Madie Genao PharmD sent at 7/6/2023  5:22 PM CDT -----  We are sending her to [facility] for Stelara new start (IV and SC). Can you please fill out the form and have Dr. Marie sign so we can send the referral. Thank you!

## 2023-07-10 LAB
HBV SURFACE AB SER QL IA: NEGATIVE
HBV SURFACE AB SERPL IA-ACNC: 4 MIU/ML

## 2023-08-11 ENCOUNTER — PATIENT MESSAGE (OUTPATIENT)
Dept: GASTROENTEROLOGY | Facility: CLINIC | Age: 53
End: 2023-08-11
Payer: MEDICARE

## 2023-08-15 ENCOUNTER — TELEPHONE (OUTPATIENT)
Dept: GASTROENTEROLOGY | Facility: CLINIC | Age: 53
End: 2023-08-15
Payer: MEDICARE

## 2023-10-13 ENCOUNTER — TELEPHONE (OUTPATIENT)
Dept: HEMATOLOGY/ONCOLOGY | Facility: CLINIC | Age: 53
End: 2023-10-13
Payer: MEDICARE

## 2023-10-13 NOTE — TELEPHONE ENCOUNTER
----- Message from Albertina Haile sent at 10/13/2023  9:26 AM CDT -----  Regarding: Call back  Contact: [phone]  Who Called: PT     Patient is calling to see if her appointment on 10/19/23 can be scheduled for a virtual visit. Please advice

## 2023-10-17 ENCOUNTER — LAB VISIT (OUTPATIENT)
Dept: LAB | Facility: HOSPITAL | Age: 53
End: 2023-10-17
Attending: NURSE PRACTITIONER
Payer: MEDICARE

## 2023-10-17 DIAGNOSIS — D75.839 THROMBOCYTOSIS: ICD-10-CM

## 2023-10-17 DIAGNOSIS — E11.9 TYPE 2 DIABETES MELLITUS WITHOUT COMPLICATION, WITHOUT LONG-TERM CURRENT USE OF INSULIN: ICD-10-CM

## 2023-10-17 LAB
ALBUMIN SERPL BCP-MCNC: 3.7 G/DL (ref 3.5–5.2)
ALP SERPL-CCNC: 67 U/L (ref 55–135)
ALT SERPL W/O P-5'-P-CCNC: 13 U/L (ref 10–44)
ANION GAP SERPL CALC-SCNC: 11 MMOL/L (ref 8–16)
AST SERPL-CCNC: 15 U/L (ref 10–40)
BASOPHILS # BLD AUTO: 0.03 K/UL (ref 0–0.2)
BASOPHILS NFR BLD: 0.4 % (ref 0–1.9)
BILIRUB SERPL-MCNC: 0.3 MG/DL (ref 0.1–1)
BUN SERPL-MCNC: 10 MG/DL (ref 6–20)
CALCIUM SERPL-MCNC: 9.5 MG/DL (ref 8.7–10.5)
CHLORIDE SERPL-SCNC: 106 MMOL/L (ref 95–110)
CO2 SERPL-SCNC: 27 MMOL/L (ref 23–29)
CREAT SERPL-MCNC: 0.9 MG/DL (ref 0.5–1.4)
DIFFERENTIAL METHOD: ABNORMAL
EOSINOPHIL # BLD AUTO: 0.1 K/UL (ref 0–0.5)
EOSINOPHIL NFR BLD: 1.2 % (ref 0–8)
ERYTHROCYTE [DISTWIDTH] IN BLOOD BY AUTOMATED COUNT: 14.1 % (ref 11.5–14.5)
EST. GFR  (NO RACE VARIABLE): >60 ML/MIN/1.73 M^2
FERRITIN SERPL-MCNC: 56 NG/ML (ref 20–300)
GLUCOSE SERPL-MCNC: 68 MG/DL (ref 70–110)
HCT VFR BLD AUTO: 41.7 % (ref 37–48.5)
HGB BLD-MCNC: 13.1 G/DL (ref 12–16)
IMM GRANULOCYTES # BLD AUTO: 0.01 K/UL (ref 0–0.04)
IMM GRANULOCYTES NFR BLD AUTO: 0.1 % (ref 0–0.5)
LYMPHOCYTES # BLD AUTO: 2.2 K/UL (ref 1–4.8)
LYMPHOCYTES NFR BLD: 31.5 % (ref 18–48)
MCH RBC QN AUTO: 26.4 PG (ref 27–31)
MCHC RBC AUTO-ENTMCNC: 31.4 G/DL (ref 32–36)
MCV RBC AUTO: 84 FL (ref 82–98)
MONOCYTES # BLD AUTO: 0.5 K/UL (ref 0.3–1)
MONOCYTES NFR BLD: 7.8 % (ref 4–15)
NEUTROPHILS # BLD AUTO: 4 K/UL (ref 1.8–7.7)
NEUTROPHILS NFR BLD: 59 % (ref 38–73)
NRBC BLD-RTO: 0 /100 WBC
PLATELET # BLD AUTO: 415 K/UL (ref 150–450)
PMV BLD AUTO: 8.9 FL (ref 9.2–12.9)
POTASSIUM SERPL-SCNC: 3.7 MMOL/L (ref 3.5–5.1)
PROT SERPL-MCNC: 8 G/DL (ref 6–8.4)
RBC # BLD AUTO: 4.97 M/UL (ref 4–5.4)
SODIUM SERPL-SCNC: 144 MMOL/L (ref 136–145)
WBC # BLD AUTO: 6.83 K/UL (ref 3.9–12.7)

## 2023-10-17 PROCEDURE — 36415 COLL VENOUS BLD VENIPUNCTURE: CPT | Mod: HCWC | Performed by: NURSE PRACTITIONER

## 2023-10-17 PROCEDURE — 82728 ASSAY OF FERRITIN: CPT | Mod: HCWC | Performed by: NURSE PRACTITIONER

## 2023-10-17 PROCEDURE — 85025 COMPLETE CBC W/AUTO DIFF WBC: CPT | Mod: HCWC | Performed by: NURSE PRACTITIONER

## 2023-10-17 PROCEDURE — 84466 ASSAY OF TRANSFERRIN: CPT | Mod: HCWC | Performed by: NURSE PRACTITIONER

## 2023-10-17 PROCEDURE — 83540 ASSAY OF IRON: CPT | Mod: HCWC | Performed by: NURSE PRACTITIONER

## 2023-10-17 PROCEDURE — 80053 COMPREHEN METABOLIC PANEL: CPT | Mod: HCWC | Performed by: NURSE PRACTITIONER

## 2023-10-18 LAB
IRON SERPL-MCNC: 70 UG/DL (ref 30–160)
SATURATED IRON: 19 % (ref 20–50)
TOTAL IRON BINDING CAPACITY: 374 UG/DL (ref 250–450)
TRANSFERRIN SERPL-MCNC: 253 MG/DL (ref 200–375)

## 2023-10-19 ENCOUNTER — TELEPHONE (OUTPATIENT)
Dept: HEMATOLOGY/ONCOLOGY | Facility: CLINIC | Age: 53
End: 2023-10-19
Payer: MEDICARE

## 2023-11-01 ENCOUNTER — OFFICE VISIT (OUTPATIENT)
Dept: HEMATOLOGY/ONCOLOGY | Facility: CLINIC | Age: 53
End: 2023-11-01
Payer: MEDICARE

## 2023-11-01 DIAGNOSIS — D50.0 IRON DEFICIENCY ANEMIA DUE TO CHRONIC BLOOD LOSS: ICD-10-CM

## 2023-11-01 DIAGNOSIS — E11.9 TYPE 2 DIABETES MELLITUS WITHOUT COMPLICATION, WITHOUT LONG-TERM CURRENT USE OF INSULIN: ICD-10-CM

## 2023-11-01 DIAGNOSIS — Z80.9 FAMILY HISTORY OF CANCER: ICD-10-CM

## 2023-11-01 DIAGNOSIS — E79.0 HYPERURICEMIA: ICD-10-CM

## 2023-11-01 DIAGNOSIS — K50.00 CROHN'S DISEASE OF SMALL INTESTINE WITHOUT COMPLICATION: ICD-10-CM

## 2023-11-01 DIAGNOSIS — D75.839 THROMBOCYTOSIS: Primary | ICD-10-CM

## 2023-11-01 DIAGNOSIS — E78.00 ELEVATED CHOLESTEROL: ICD-10-CM

## 2023-11-01 DIAGNOSIS — I30.1 ACUTE VIRAL PERICARDITIS: ICD-10-CM

## 2023-11-01 PROCEDURE — 99214 PR OFFICE/OUTPT VISIT, EST, LEVL IV, 30-39 MIN: ICD-10-PCS | Mod: HCWC,95,, | Performed by: NURSE PRACTITIONER

## 2023-11-01 PROCEDURE — 1160F RVW MEDS BY RX/DR IN RCRD: CPT | Mod: HCWC,CPTII,95, | Performed by: NURSE PRACTITIONER

## 2023-11-01 PROCEDURE — 1159F PR MEDICATION LIST DOCUMENTED IN MEDICAL RECORD: ICD-10-PCS | Mod: HCWC,CPTII,95, | Performed by: NURSE PRACTITIONER

## 2023-11-01 PROCEDURE — 1160F PR REVIEW ALL MEDS BY PRESCRIBER/CLIN PHARMACIST DOCUMENTED: ICD-10-PCS | Mod: HCWC,CPTII,95, | Performed by: NURSE PRACTITIONER

## 2023-11-01 PROCEDURE — 1159F MED LIST DOCD IN RCRD: CPT | Mod: HCWC,CPTII,95, | Performed by: NURSE PRACTITIONER

## 2023-11-01 PROCEDURE — 99214 OFFICE O/P EST MOD 30 MIN: CPT | Mod: HCWC,95,, | Performed by: NURSE PRACTITIONER

## 2023-11-01 NOTE — PROGRESS NOTES
Patient ID: Giovanna Medina is a 53 y.o. female.    Chief Complaint: No chief complaint on file.    The patient location is: car  The chief complaint leading to consultation is: thrombocytosis, iron deficiency    Visit type: audiovisual    Face to Face time with patient: 20  30 minutes of total time spent on the encounter, which includes face to face time and non-face to face time preparing to see the patient (eg, review of tests), Obtaining and/or reviewing separately obtained history, Documenting clinical information in the electronic or other health record, Independently interpreting results (not separately reported) and communicating results to the patient/family/caregiver, or Care coordination (not separately reported).         Each patient to whom he or she provides medical services by telemedicine is:  (1) informed of the relationship between the physician and patient and the respective role of any other health care provider with respect to management of the patient; and (2) notified that he or she may decline to receive medical services by telemedicine and may withdraw from such care at any time.    Notes:         History     HPI    Giovanna Medina is a 52yr old female referred to hematology for evaluation of thrombocytosis by personal referral. Daughter being seen for ALMA by Dr CHRISTIANO Macdonald in this clinic and pt wanted to be seen by same group. Comorbidities include diabetes, sees optho for concern of glaucoma, sacroiliac arthritis (diagnosis age 21yrs), vertigo, hypercholesterolemia, has history of viral induced pericarditis in 12/2021 and has followed pulmonology and cardiology for this.  Pt states A1c improved to 5.9 from 7.9 with intermittent metformin usage, weight and diet management which she is continuing to work on. She is currently not on pravastatin for cholesterol stating her cardiologist is giving her a chance as well with weight and diet management. Currently has completed follow up with  "pulmonology, on  [facility]ly visits with cardiology at [facility]. Pt did complete a screening colonoscopy today 22 which per pt report she has "small ulcers in colon, they said they don't think its cancer but sending specimens to check". Will follow up on this. Pt was on colchicine and TID ibuprofen for  Pericarditis, discontinued 2022 after DUB. She is on a daily baby asa since pericarditis diagnosis. Pericarditis etiology was suspected as viral illness exposure with grandchildren. Covid diagnosis 2021.  Concern for nodule in long on CXR 3/2022 was clarified as old consolidation, improved, on recommended CT chest with cardiology and pulmonology chart review as well as radiography results from [facility] provider office visits. Pericarditis was noted as resolved, EF noted as normal.     Family history includes brother  with history of leukemia but death related to medication response/cardiac arrest; one brother had a nasal cancer with surgical/radiation/chemo management and is currently living; another brother  due to leukemia; another brother  after a type of abdominal cancer,  mother  with CHF complications and aneurysm to heart age 60y, father  due to trauma complications in his mid 30s. Pt's daughter has ALMA.    Pt denies fatigue, flushing, itching, chest pain, sob/carter, leg swelling, n/v/d, abdominal pain, abdominal swelling, hemoptysis, hematemesis, melena, epistaxis, easy bleeding. She has had some intentional weight loss with trying to improve diabetes and cholesterol management.     INTERVAL  -here for thrombocytosis and iron deficiency follow up  - Pt did not want iron infusions 2023, preferred to try oral for a longer period of time  -history of uterine biopsy negative 2022  -GI colonoscopy 22 with ulcers, negative biopsy, repeat colonoscopy 22 with repeat biopsies diverticulosis sigmoid, crohn's disease with ileitis and colitis, " severe inflammation, she has not had any notable gi blood loss she has observed  - started Stelara for Crohns 2023  -reports she is feeling really good, fatigue resolved, denies chest pain, sob, abdominal pain, n/v/d, hematemesis, melena, weight loss, nightsweats  -remains on ozempic for diabetes and weight and states trying to find time to start exercising again; has lost 30# now down to 170#      Past medical, surgical, and medication history, past family history reviewed and uptdated with patient today as noted.      Past Medical History:   Diagnosis Date    Arthritis     Benign paroxysmal vertigo, bilateral     Crohn's disease     Diabetes mellitus, type 2     Glaucoma suspect of both eyes     History of pericarditis 2021    Hypercholesterolemia     Optic atrophy, unspecified        Past Surgical History:   Procedure Laterality Date    BRAIN SURGERY  2005     SECTION, CLASSIC      COLONOSCOPY N/A 2022    Procedure: COLONOSCOPY Suprep;  Surgeon: Vishal Mariee MD;  Location: [facility] ENDO;  Service: Endoscopy;  Laterality: N/A;    COLONOSCOPY N/A 2022    Procedure: COLONOSCOPY;  Surgeon: Vishal Mariee MD;  Location: [facility] ENDO;  Service: Endoscopy;  Laterality: N/A;    TUBAL LIGATION         Oncology History:  Oncology History    No history exists.        Review of Systems:  Review of Systems   Constitutional:  Negative for activity change, appetite change, chills, fatigue, fever and unexpected weight change.   HENT:  Negative for nasal congestion, mouth sores and nosebleeds.    Eyes:  Negative for photophobia and visual disturbance.   Respiratory:  Negative for chest tightness and shortness of breath.    Cardiovascular:  Negative for chest pain, palpitations and leg swelling.   Gastrointestinal:  Negative for abdominal pain, blood in stool, change in bowel habit, constipation, diarrhea, nausea and vomiting.   Genitourinary:  Negative for hematuria and menstrual problem.         Postmenopausal   Musculoskeletal:  Negative for gait problem, joint swelling and myalgias.   Integumentary:  Negative for pallor and rash.   Neurological:  Negative for dizziness, syncope and weakness.   Hematological:  Negative for adenopathy. Does not bruise/bleed easily.   Psychiatric/Behavioral:  Negative for sleep disturbance. The patient is not nervous/anxious.           Physical Exam   Vitals:  LMP  (LMP Unknown)     PHYSICAL EXAM    Pt at home, visible on screen, talking with daughter as well. NAD.     Labs:     Lab Results   Component Value Date    WBC 6.83 10/17/2023    HGB 13.1 10/17/2023    HCT 41.7 10/17/2023    MCV 84 10/17/2023     10/17/2023       Lab Results   Component Value Date    IRON 70 10/17/2023    TRANSFERRIN 253 10/17/2023    TIBC 374 10/17/2023    FESATURATED 19 (L) 10/17/2023      Lab Results   Component Value Date    FERRITIN 56 10/17/2023     T sat 9.66% c/w ALMA          EGD, colonoscopy 12/7/22 with Dr Mariee  Impression:    - Diverticulosis in the sigmoid colon.                          - Crohn's disease with ileitis and colitis.                          Inflammation was found. This was severe. Biopsied.                          - Crohn's disease, with ileitis and colitis.                          Inflammation was found. This was severe, unchanged                          compared to previous examinations. Biopsied.                          - A few erosions in the sigmoid colon. Biopsied.                          - The examination was otherwise normal on direct                          and retroflexion views.                          Exam today( off all nsaids) shows at least similar                          if not worse inflammatory changes mainly in the TI                          and cecum / IC valve, prox ascending colon. There                          is possible tiny pinhole fistulous tract near the                          IC valve, and there may be more upstream TI                           stricturing, could not traverse above about 15 cm                          in the TI, but this could be from looping as well.                          This is most consistent with Crohns disease, with                          significant activity, and I think she warrants                          therapy.       CT CHEST WITHOUT CONTRAST 3/18/22  IMPRESSION:     PATCHY SUBPLEURAL AIRSPACE CONSOLIDATION IN THE POSTERIOR RIGHT LOWER LOBE CORRESPONDS TO THE ABNORMALITY SEEN ON RECENT CHEST X-RAY. THIS MAY REPRESENT RESOLVING ATELECTASIS, PNEUMONIA, ETC. THIS APPEARANCE HAS SIGNIFICANTLY IMPROVED COMPARED TO CT SCAN OF CHEST DATED 12/04/2021.     Electronically Signed By: Elian Turner MD 3/18/2022 11:23 AM CDT      TRANSTHORACIC ECHOCARDIOGRAM 3/8/22  Interpretation Summary   Ejection Fraction = 60-65%.   No regional wall motion abnormalities noted.   The left ventricular size, thickness and function are normal   There is no left ventricular diastolic dysfunction.   normal filling pressures   There is no pericardial effusion.   Previous echo 12/2021 had moderate pericardial effusion.     CHEST XRAY 3/8/22  IMPRESSION:   Possible new 1 cm pulmonary nodule lateral right lung base as described above. This was not present on 1/1/2022. Consider CT chest.          Physical Exam:  Physical Exam   Constitutional: She is oriented to person, place, and time. She appears obese.  Non-toxic appearance. No distress. She does not appear ill.   HENT:   Head: Normocephalic and atraumatic.   Mouth/Throat: Mucous membranes are moist. Oropharynx is clear.   Eyes: Conjunctivae are normal. No scleral icterus.   Cardiovascular: Normal rate, regular rhythm, normal heart sounds and normal pulses.   No murmur heard.Pulmonary:      Effort: Pulmonary effort is normal.      Breath sounds: Normal breath sounds.     Abdominal: Soft. Bowel sounds are normal. She exhibits no distension. There is no abdominal tenderness. There is no  guarding.   Musculoskeletal:      Cervical back: Normal range of motion and neck supple. No rigidity.      Right lower leg: No edema.      Left lower leg: No edema.   Neurological: She is alert and oriented to person, place, and time. Gait normal.   Skin: Skin is warm and dry. No bruising noted. She is not diaphoretic. No jaundice or pallor.   Psychiatric: Her behavior is normal. Mood normal.   Nursing note and vitals reviewed.        ECOG:   ECOG SCORE    0 - Fully active-able to carry on all pre-disease performance without restriction          Discussion     Problem List:  Problem List Items Addressed This Visit          Oncology    Iron deficiency anemia due to chronic blood loss    Thrombocytosis - Primary     Other Visit Diagnoses       Hyperuricemia        Acute viral pericarditis        Type 2 diabetes mellitus without complication, without long-term current use of insulin        Elevated cholesterol        Crohn's disease of small intestine without complication        Family history of cancer                       Thrombocytosis, microcytic anemia, ALMA  - Pt with evidence thrombocytosis as early as 2009 by review in [facility]. Most recent labs are 1/2022 post pericarditis and covid illness which could also have affected platelet values.  - Neg Hep panel, RPR, TSH 1/11/2022.   - Pt denies any symptoms at present.  - She did have a screening colonoscopy today and as above, she states she was told she had small ulcers. Pending full report. If ulcers, there could be occult blood loss, consideration of ALMA which would additionally cause a reactive thrombocytosis.   - Alex 2 with reflex, BCR:ABL1, retics, hgb electrophoresis 7/2022 normal   - 10/17/22 labs with normal H/H, platelets, iron studies, continued elevation of uric acid, esr, crp but improved.   - Repeat colonoscopy 12/7/22 with biopsies, crohns concern  - 12/6/22 labs reviewed, normal H/H, mild microcytic evidence, platelets are normal. Iron studies with low  iron sat otherwise normal. She will start po iron otc daily.   - Advised to reduce seafood to 1-2x/week given uric acid, if remains elevated will consider allopurinol.   - 3/10/23 labs with normal H/H, mild microcytic evidence, platelets with slight increase to 486 K/uL, iron studies stable with low iron sat otherwise normal iron level and ferritin.  - Noncompliant with oral iron, likely poor absorption as well as some chronic blood loss re gi inflammation with crohns  - 6/5/23 labs  normal hgb 13.2 g/dL, iron low normal and iron sat 10%,   T sat calculated at 9.66%. Platelets with mild elevation.  - Pt did not want iron infusions 6/2023, preferred to try oral for a longer period of time   - Labs 10/17/23 hgb stable/normal 13.1 g/dL, mild microcytic evidence, normal platelets, iron and iron sat have improved iron normal 70 ug/dL, iron sat increased from 10% (6/2023) to 19%, ferritin has decreased to low normal 56 ng/mL. T sat calculated at 18.72% c/w ALMA.  - Pt admits she has not been taking oral iron, iron improvements noted, she will start it once daily now   - RTC 4 months with labs and follow up appt      Hyperuricemia  -uric acid elevation, has now decreased to 6.4 mg/dL  -eats shellfish less frequently  -has been on colchicine in past, now stopped with GI concerns  -continue to monitor    Type 2 Diabetes  - taking ozempic as prescribed by pcp  - lost 30#, yesterday 170.4#  - Management to continue per pcp     Acute viral pericarditis  - 12/2021 diagnosis, resolution noted in cardiology and pulmonology follow up with repeated cxr and CT as ntoed above  - considered likely viral related given she cares for several grandchildren, many uris with grandchildren  - cardiology follow up in 1yr as scheduled 3/2022, pulmonology prn    Elevated Cholesterol  - Pt states she is working on lifestyle changes, weight reduction   - Not currently taking pravastatin, reports this was in agreement with her cardiologist if she is  successful with lifestyle changes.  - Management deferred to cardiology     Crohns small intestine without complication  - no ASA at present due to GI side effects  - has started Stelara treatment  - no symptoms at present  - management deferred to GI/Dr Mariee    Family history of cancer  - genetic testing referral previously       Advanced Care Planning  - completed 7/13/22    Advanced Care Planning  Advance care planning  - After our discussion at previous visit, the patient has decided to complete a HCPOA and has appointed karson Yeager as primary at [phone], pastor Janes, at [phone] as secondary, and Nitin Vaca  brother at [phone] as third.       Juanita Seals, NP-C  [facility]  Hematology/Oncology  [address]  [place]  [phone]

## 2023-11-03 ENCOUNTER — TELEPHONE (OUTPATIENT)
Dept: GASTROENTEROLOGY | Facility: CLINIC | Age: 53
End: 2023-11-03
Payer: MEDICARE

## 2023-11-27 ENCOUNTER — TELEPHONE (OUTPATIENT)
Dept: OBSTETRICS AND GYNECOLOGY | Facility: CLINIC | Age: 53
End: 2023-11-27
Payer: MEDICARE

## 2023-11-27 NOTE — TELEPHONE ENCOUNTER
Spoke w pt. Offered appt with Dr. Martinez for 1/8/24. Pt declined and will see Dr. Dougherty this week

## 2023-11-27 NOTE — TELEPHONE ENCOUNTER
----- Message from Chanell Shelton sent at 11/27/2023  1:29 PM CST -----  Contact: PT  11/27/23    .Type:  Sooner Appointment Request    Caller is requesting a sooner appointment.  Caller declined first available appointment listed below.  Caller will not accept being placed on the waitlist and is requesting a message be sent to doctor.  Name of Caller: Giovanna  When is the first available appointment? 03/2024  Symptoms: screening  Would the patient rather a call back or a response via [facility]?  C/b  Best Call Back Number: [phone] (home)        Additional Information:  pt scheduled I [place]/ but would prefer to be worked in if possible    Thanks    Chanell

## 2023-11-28 ENCOUNTER — TELEPHONE (OUTPATIENT)
Dept: HEMATOLOGY/ONCOLOGY | Facility: CLINIC | Age: 53
End: 2023-11-28
Payer: MEDICARE

## 2023-11-28 NOTE — TELEPHONE ENCOUNTER
----- Message from Chanell Shelton sent at 11/28/2023  3:56 PM CST -----  Contact: Jaquelin Duron FAX  11/28/23    Pt Lab results scanned into media mgr.    Thanks    Chanell LAIRD

## 2023-11-29 ENCOUNTER — OFFICE VISIT (OUTPATIENT)
Dept: OBSTETRICS AND GYNECOLOGY | Facility: CLINIC | Age: 53
End: 2023-11-29
Payer: MEDICARE

## 2023-11-29 VITALS — WEIGHT: 172.81 LBS | BODY MASS INDEX: 30.62 KG/M2

## 2023-11-29 DIAGNOSIS — Z01.419 WELL WOMAN EXAM WITH ROUTINE GYNECOLOGICAL EXAM: Primary | ICD-10-CM

## 2023-11-29 DIAGNOSIS — Z12.31 ENCOUNTER FOR SCREENING MAMMOGRAM FOR MALIGNANT NEOPLASM OF BREAST: ICD-10-CM

## 2023-11-29 PROCEDURE — 99999 PR PBB SHADOW E&M-EST. PATIENT-LVL I: CPT | Mod: PBBFAC,HCWC,, | Performed by: STUDENT IN AN ORGANIZED HEALTH CARE EDUCATION/TRAINING PROGRAM

## 2023-11-29 PROCEDURE — G0101 PR CA SCREEN;PELVIC/BREAST EXAM: ICD-10-PCS | Mod: HCWC,GZ,S$GLB, | Performed by: STUDENT IN AN ORGANIZED HEALTH CARE EDUCATION/TRAINING PROGRAM

## 2023-11-29 PROCEDURE — 99999 PR PBB SHADOW E&M-EST. PATIENT-LVL I: ICD-10-PCS | Mod: PBBFAC,HCWC,, | Performed by: STUDENT IN AN ORGANIZED HEALTH CARE EDUCATION/TRAINING PROGRAM

## 2023-11-29 PROCEDURE — 87624 HPV HI-RISK TYP POOLED RSLT: CPT | Mod: HCWC | Performed by: STUDENT IN AN ORGANIZED HEALTH CARE EDUCATION/TRAINING PROGRAM

## 2023-11-29 PROCEDURE — 3008F PR BODY MASS INDEX (BMI) DOCUMENTED: ICD-10-PCS | Mod: HCWC,CPTII,S$GLB, | Performed by: STUDENT IN AN ORGANIZED HEALTH CARE EDUCATION/TRAINING PROGRAM

## 2023-11-29 PROCEDURE — 3008F BODY MASS INDEX DOCD: CPT | Mod: HCWC,CPTII,S$GLB, | Performed by: STUDENT IN AN ORGANIZED HEALTH CARE EDUCATION/TRAINING PROGRAM

## 2023-11-29 PROCEDURE — 88175 CYTOPATH C/V AUTO FLUID REDO: CPT | Mod: HCWC | Performed by: STUDENT IN AN ORGANIZED HEALTH CARE EDUCATION/TRAINING PROGRAM

## 2023-11-29 PROCEDURE — G0101 CA SCREEN;PELVIC/BREAST EXAM: HCPCS | Mod: HCWC,GZ,S$GLB, | Performed by: STUDENT IN AN ORGANIZED HEALTH CARE EDUCATION/TRAINING PROGRAM

## 2023-11-29 RX ORDER — USTEKINUMAB 130 MG/26ML
SOLUTION INTRAVENOUS
Status: ON HOLD | COMMUNITY
Start: 2023-08-07 | End: 2024-03-22 | Stop reason: HOSPADM

## 2023-11-29 NOTE — PROGRESS NOTES
CC: Well woman exam    HPI:  Giovanna Medina is a 53 y.o. female  presents for a well woman exam.  She has no issues, problems, or complaints. Had light bleeding after sex recently, does report vaginal dryness and pain/burning with sex that time, no bleeding since.       Patient history:   Past Medical History:   Diagnosis Date    Arthritis     Benign paroxysmal vertigo, bilateral     Crohn's disease     Diabetes mellitus, type 2     Glaucoma suspect of both eyes     History of pericarditis 2021    Hypercholesterolemia     Optic atrophy, unspecified      Past Surgical History:   Procedure Laterality Date    BRAIN SURGERY  2005     SECTION, CLASSIC      COLONOSCOPY N/A 2022    Procedure: COLONOSCOPY Suprep;  Surgeon: Vishal Mariee MD;  Location: [facility] ENDO;  Service: Endoscopy;  Laterality: N/A;    COLONOSCOPY N/A 2022    Procedure: COLONOSCOPY;  Surgeon: Vishal Mariee MD;  Location: [facility] ENDO;  Service: Endoscopy;  Laterality: N/A;    TUBAL LIGATION       OB History    Para Term  AB Living   3 3 3 0 0     SAB IAB Ectopic Multiple Live Births   0 0 0          # Outcome Date GA Lbr Christopher/2nd Weight Sex Delivery Anes PTL Lv   3 Term            2 Term            1 Term                GYN  Menopausal: No  History of abnormal paps: DENIES  Abnormal or postmenopausal bleeding: DENIES  History of abnormal mammograms:DENIES   Family history of breast or ovarian cancer: yes  Any breast masses, pain, skin changes, or nipple discharge: DENIES  Possible recent STD exposure: denies  Contraception: N/A    Pap: 2022, Done today  Mammogram:  ordered      Family History   Problem Relation Age of Onset    Diabetes Mother     Cataracts Mother     Glaucoma Mother     Stroke Mother     Heart disease Mother         CHF and aorta aneurysm complications at death    No Known Problems Father         trauma led to death at mid 30s    Diabetes Brother         3 brothers , x1  leukemia, x1 leukemia but had cardiac arrest after medication given, was on hospice; x1 abdominal cancer but uncertain type    Breast cancer Maternal Grandmother     Blindness Neg Hx     Macular degeneration Neg Hx     Retinal detachment Neg Hx     Colon cancer Neg Hx     Ovarian cancer Neg Hx      Social History     Tobacco Use    Smoking status: Never    Smokeless tobacco: Never   Substance Use Topics    Alcohol use: No    Drug use: No     Allergies: Codeine    Current Outpatient Medications:     OZEMPIC 0.25 mg or 0.5 mg (2 mg/3 mL) pen injector, , Disp: , Rfl:     STELARA 130 mg/26 mL injection, , Disp: , Rfl:     aspirin 81 MG Chew, Take 81 mg by mouth once daily., Disp: , Rfl:     atropine 1% (ISOPTO ATROPINE) 1 % Drop, Place 1 drop into both eyes daily as needed (eye pain). (Patient not taking: Reported on 11/29/2023), Disp: 5 mL, Rfl: 3    BD ALCOHOL SWABS PadM, , Disp: , Rfl:     beta-carotene,A,-vits C and E (ANTIOXIDANT ORAL), Take 1 tablet by mouth. Unsure on brand, white bottle, Disp: , Rfl:     COMBIGAN 0.2-0.5 % Drop, Place 1 drop into both eyes 2 (two) times a day., Disp: 45 mL, Rfl: 3    diclofenac sodium (VOLTAREN) 1 % Gel, Apply 2 g topically 3 (three) times daily., Disp: 100 g, Rfl: 2    ferrous sulfate (FEOSOL) Tab tablet, Take 1 tablet by mouth daily with breakfast. Elemental QD for 6 weeks if iron improved will stop infusions, not started yet, Disp: , Rfl:     ketorolac 0.5% (ACULAR) 0.5 % Drop, Place 1 drop into the left eye 3 (three) times daily., Disp: 10 mL, Rfl: 2    Lactobacillus rhamnosus GG (CULTURELLE) 10 billion cell capsule, Take 1 capsule by mouth once daily., Disp: , Rfl:     loratadine (CLARITIN) 10 mg tablet, Take 1 tablet (10 mg total) by mouth once daily., Disp: 30 tablet, Rfl: 0    meclizine (ANTIVERT) 12.5 mg tablet, Take 1 tablet by mouth daily as needed., Disp: , Rfl:     pravastatin (PRAVACHOL) 40 MG tablet, , Disp: , Rfl:     TRUE METRIX GLUCOSE TEST STRIP Strp, , Disp:  , Rfl:     wheat dextrin (BENEFIBER CLEAR SF, DEXTRIN,) 3 gram/3.5 gram PwPk, Take 1 Dose by mouth. Per package, Disp: , Rfl:        ROS:  GENERAL: Denies weight gain or weight loss. Feeling well overall.   SKIN: Denies rash or lesions.   HEAD: Denies head injury or headache.   NODES: Denies enlarged lymph nodes.   CHEST: Denies chest pain or shortness of breath.   CARDIOVASCULAR: Denies palpitations or left sided chest pain.   ABDOMEN: No abdominal pain, constipation, diarrhea, nausea, vomiting or rectal bleeding.   URINARY: No frequency, dysuria, hematuria, or burning on urination.  REPRODUCTIVE: See HPI.   BREASTS: The patient performs breast self-examination and denies pain, lumps, or nipple discharge.   HEMATOLOGIC: No easy bruisability or excessive bleeding.  MUSCULOSKELETAL: Denies joint pain or swelling.   NEUROLOGIC: Denies syncope or weakness.   PSYCHIATRIC: Denies depression, anxiety or mood swings.    Objective:   Wt 78.4 kg (172 lb 13.5 oz)   LMP  (LMP Unknown)   BMI 30.62 kg/m²   /77    Physical Exam:  APPEARANCE: Well nourished, well developed, in no acute distress.  AFFECT: WNL, alert and oriented x 3  SKIN: No acne or hirsutism  NECK: Neck symmetric without masses or thyromegaly  NODES: No inguinal, cervical, axillary, or femoral lymph node enlargement  CHEST: Good respiratory effect  ABDOMEN: Soft.  No tenderness or masses.  No hepatosplenomegaly.  No hernias.  BREASTS: Symmetrical, no skin changes or visible lesions.  No palpable masses, nipple discharge bilaterally. Small folliculitis bump left breast crease and left armpit.   PELVIC: Normal external genitalia without lesions.  Normal hair distribution.  Adequate perineal body, normal urethral meatus.  Vagina atrophic without lesions or discharge.  Cervix pink, without lesions, discharge or tenderness.  No significant cystocele or rectocele.  Bimanual exam shows uterus to be normal size, regular, mobile and nontender.  Adnexa without  masses or tenderness.   EXTREMITIES: No edema.    ASSESSMENT AND PLAN  1. Well woman exam with routine gynecological exam  Liquid-Based Pap Smear, Screening    HPV High Risk Genotypes, PCR    Mammo Digital Screening Bilat w/ Adi      2. Encounter for screening mammogram for malignant neoplasm of breast  Mammo Digital Screening Bilat w/ Adi          Annual exam  Breast and pelvic exam: wnl   Patient counseled on ASCCP guidelines for cervical cytology screening  Cervical screening: done today   Patient counseled on current recommendations for breast cancer screening  Mammogram screening: ordered  STD testing: not requested today  Osteoporosis screening at 65  Tobacco cessation counseling n/a  Discussed vaginal estrogen use for dryness and dyspareunia, patient will consider if she would like to try and let me know     She was counseled to follow up with her PCP for other routine health maintenance      Follow up in about 1 year (around 11/29/2024).      Anisha Dougherty MD  OBGYN [facility]

## 2023-11-30 RX ORDER — USTEKINUMAB 90 MG/ML
90 INJECTION, SOLUTION SUBCUTANEOUS
Qty: 1 EACH | Refills: 2 | Status: CANCELLED | OUTPATIENT
Start: 2023-11-30

## 2023-11-30 NOTE — TELEPHONE ENCOUNTER
----- Message from Juliane Johnson, RN sent at 11/29/2023 11:49 AM CST -----  Regarding: FW: Tyler Villegas  Contact: Claudette Rao-#[phone]  This looks like a possible refill request for SQ Stelara through [facility].  ----- Message -----  From: Yamile Sifuentes  Sent: 11/29/2023  11:42 AM CST  To: [facility]ER[facility] Staff  Subject: Tyler Wilkins/ [facility] calling to request clearance for patient to receive her Stelara Injection

## 2023-11-30 NOTE — TELEPHONE ENCOUNTER
"IBD medications Stelara 90 mg SC every 8 weeks    Refill request for Stelara 90 mg    Allergies reviewed Yes    Drug Monitoring labs/frequency for all IBD meds:  CBC, CMP - q 6 months  TB, HEP B - Yearly    Lab Results   Component Value Date    HEPBSAG Non-reactive 07/06/2023    HEPBCAB Non-reactive 07/06/2023     Lab Results   Component Value Date    TBGOLDPLUS Negative 07/06/2023     No results found for: "QUANTNILVALU", "QUANTIFERON", "QUANTTBGDPL"   Lab Results   Component Value Date    OUXQECTT59KR 20 (L) 07/06/2023    ICBUVUTC02 432 07/06/2023     Lab Results   Component Value Date    WBC 6.83 10/17/2023    HGB 13.1 10/17/2023    HCT 41.7 10/17/2023    MCV 84 10/17/2023     10/17/2023     Lab Results   Component Value Date    CREATININE 0.9 10/17/2023    ALBUMIN 3.7 10/17/2023    BILITOT 0.3 10/17/2023    ALKPHOS 67 10/17/2023    AST 15 10/17/2023    ALT 13 10/17/2023       Lab due date (mo/yr):  04/2024    Labs scheduled: Yes 03/01/2024    Next appt: 12/14/2023    RX refill sent to provider for amount until next labs: Yes      "

## 2023-12-01 ENCOUNTER — TELEPHONE (OUTPATIENT)
Dept: GASTROENTEROLOGY | Facility: CLINIC | Age: 53
End: 2023-12-01
Payer: MEDICARE

## 2023-12-01 ENCOUNTER — HOSPITAL ENCOUNTER (OUTPATIENT)
Dept: RADIOLOGY | Facility: HOSPITAL | Age: 53
Discharge: HOME | End: 2023-12-01
Attending: STUDENT IN AN ORGANIZED HEALTH CARE EDUCATION/TRAINING PROGRAM
Payer: MEDICARE

## 2023-12-01 DIAGNOSIS — Z01.419 WELL WOMAN EXAM WITH ROUTINE GYNECOLOGICAL EXAM: ICD-10-CM

## 2023-12-01 DIAGNOSIS — Z12.31 ENCOUNTER FOR SCREENING MAMMOGRAM FOR MALIGNANT NEOPLASM OF BREAST: ICD-10-CM

## 2023-12-01 PROCEDURE — 77067 MAMMO DIGITAL SCREENING BILAT WITH TOMO: ICD-10-PCS | Mod: 26,HCWC,, | Performed by: RADIOLOGY

## 2023-12-01 PROCEDURE — 77067 SCR MAMMO BI INCL CAD: CPT | Mod: 26,HCWC,, | Performed by: RADIOLOGY

## 2023-12-01 PROCEDURE — 77063 MAMMO DIGITAL SCREENING BILAT WITH TOMO: ICD-10-PCS | Mod: 26,HCWC,, | Performed by: RADIOLOGY

## 2023-12-01 PROCEDURE — 77063 BREAST TOMOSYNTHESIS BI: CPT | Mod: 26,HCWC,, | Performed by: RADIOLOGY

## 2023-12-01 PROCEDURE — 77067 SCR MAMMO BI INCL CAD: CPT | Mod: TC,HCWC

## 2023-12-01 NOTE — TELEPHONE ENCOUNTER
----- Message from Juliane Johnson, RN sent at 11/29/2023 11:49 AM CST -----  Regarding: FW: Tyler Villegas  Contact: Claudette Rao-#[phone]  This looks like a possible refill request for SQ Stelara through [facility].  ----- Message -----  From: Yamile Sifuentes  Sent: 11/29/2023  11:42 AM CST  To: Frank GUERRA Staff  Subject: Tyler Wilkins/ [facility] calling to request clearance for patient to receive her Stelara Injection

## 2023-12-05 ENCOUNTER — TELEPHONE (OUTPATIENT)
Dept: GASTROENTEROLOGY | Facility: CLINIC | Age: 53
End: 2023-12-05
Payer: MEDICARE

## 2023-12-05 LAB
HPV HR 12 DNA SPEC QL NAA+PROBE: NEGATIVE
HPV16 AG SPEC QL: NEGATIVE
HPV18 DNA SPEC QL NAA+PROBE: NEGATIVE

## 2023-12-05 NOTE — TELEPHONE ENCOUNTER
[facility]ed [facility], it was for a refill. They went ahead an took a verbal for the Stelara 90mg q 8 weeks with 1 refill. She has labs due in 04/2024.

## 2023-12-05 NOTE — TELEPHONE ENCOUNTER
----- Message from Kasia Epps RN sent at 12/4/2023 10:36 AM CST -----    ----- Message -----  From: Aileen Mina  Sent: 12/4/2023  10:17 AM CST  To: Frank GUERRA Staff    Who Called: [facility]     What is the request in detail: Requesting call back to discuss New Rx new pharmacy , will send fax request. Please advise     STELARA 45mg per 0.5ML           Pharmacy: [facility]  Fax or e-scribe : [phone]      Can the clinic reply by [facility]? No    Best Call Back Number: [phone]     Additional Information:

## 2023-12-07 LAB
FINAL PATHOLOGIC DIAGNOSIS: NORMAL
Lab: NORMAL

## 2023-12-14 ENCOUNTER — OFFICE VISIT (OUTPATIENT)
Dept: GASTROENTEROLOGY | Facility: CLINIC | Age: 53
End: 2023-12-14
Payer: MEDICARE

## 2023-12-14 ENCOUNTER — TELEPHONE (OUTPATIENT)
Dept: GASTROENTEROLOGY | Facility: CLINIC | Age: 53
End: 2023-12-14
Payer: MEDICARE

## 2023-12-14 VITALS
HEIGHT: 63 IN | SYSTOLIC BLOOD PRESSURE: 143 MMHG | HEART RATE: 108 BPM | OXYGEN SATURATION: 99 % | TEMPERATURE: 98 F | BODY MASS INDEX: 30.62 KG/M2 | DIASTOLIC BLOOD PRESSURE: 89 MMHG | WEIGHT: 172.81 LBS

## 2023-12-14 DIAGNOSIS — K50.80 CROHN'S DISEASE OF BOTH SMALL AND LARGE INTESTINE WITHOUT COMPLICATION: Primary | ICD-10-CM

## 2023-12-14 DIAGNOSIS — D50.0 IRON DEFICIENCY ANEMIA DUE TO CHRONIC BLOOD LOSS: ICD-10-CM

## 2023-12-14 PROCEDURE — 99214 PR OFFICE/OUTPT VISIT, EST, LEVL IV, 30-39 MIN: ICD-10-PCS | Mod: HCWC,S$GLB,, | Performed by: INTERNAL MEDICINE

## 2023-12-14 PROCEDURE — 3008F BODY MASS INDEX DOCD: CPT | Mod: HCWC,CPTII,S$GLB, | Performed by: INTERNAL MEDICINE

## 2023-12-14 PROCEDURE — 3079F PR MOST RECENT DIASTOLIC BLOOD PRESSURE 80-89 MM HG: ICD-10-PCS | Mod: HCWC,CPTII,S$GLB, | Performed by: INTERNAL MEDICINE

## 2023-12-14 PROCEDURE — 3079F DIAST BP 80-89 MM HG: CPT | Mod: HCWC,CPTII,S$GLB, | Performed by: INTERNAL MEDICINE

## 2023-12-14 PROCEDURE — 3077F PR MOST RECENT SYSTOLIC BLOOD PRESSURE >= 140 MM HG: ICD-10-PCS | Mod: HCWC,CPTII,S$GLB, | Performed by: INTERNAL MEDICINE

## 2023-12-14 PROCEDURE — 99214 OFFICE O/P EST MOD 30 MIN: CPT | Mod: HCWC,S$GLB,, | Performed by: INTERNAL MEDICINE

## 2023-12-14 PROCEDURE — 3077F SYST BP >= 140 MM HG: CPT | Mod: HCWC,CPTII,S$GLB, | Performed by: INTERNAL MEDICINE

## 2023-12-14 PROCEDURE — 3008F PR BODY MASS INDEX (BMI) DOCUMENTED: ICD-10-PCS | Mod: HCWC,CPTII,S$GLB, | Performed by: INTERNAL MEDICINE

## 2023-12-14 RX ORDER — FLUTICASONE PROPIONATE 50 MCG
SPRAY, SUSPENSION (ML) NASAL
COMMUNITY
Start: 2023-11-27

## 2023-12-14 NOTE — PATIENT INSTRUCTIONS
Continue Stelara  Get labs  Submit stool test  Depending on results will possibly set up colonoscopy  Follow up in 6 months

## 2023-12-14 NOTE — PROGRESS NOTES
[facility]          Inflammatory Bowel Disease Follow Up Note         TODAY'S VISIT DATE:  12/14/2023    Reason for Consult:    No chief complaint on file.      PCP: Carmina Cordova)      Referring MD:   No ref. provider found    History of Present Illness:  Giovanna Medina who is a 53 y.o. female is being seen today at the [facility] on 12/14/2023 for inflammatory bowel disease- Crohn's disease.  She started Stelara on August 7.  She received 2 subsequent injections.  She reports that she has not had any issues with the medication.  She has not noticed any real change in her bowel movements.  Most days she has 1 bowel movement a day but she will sometimes have 2-3.  She isn't sure if there was any blood in his stools guess have her visual impairment.  Her stools are formed.  She has not noticed any change in any of her smptoms at all.  Of note though she did recently have repeat iron studies checked and those had improved significantly since starting the Stelara.      IBD History:  In July of 2022 she underwent a screening colonoscopy was found to have ulceration in the ascending colon, cecum, and in the terminal ileum.  Biopsy showed chronic inflammatory changes.  At that time it was noted that she was taking a significant amount of NSAIDs.  Inflammatory markers checked during that time revealed elevated CRP and calprotectin in the 300s.  There was concern for possible Crohn's disease but given the ongoing NSAID use there was suspicion that this might be the underlying issue.  She stopped NSAIDs and had a repeat colonoscopy in December of 2022.  At that time she again had ulceration in the ascending colon and cecum as well as in the terminal ileum.  In addition to that her colonoscopy pictures suggest that maybe there was developing stenosis at the ileocecal valve and in the ascending colon at the level of the ileocecal valve.  She  "started Stelara on August 7, 2023.    IBD Details:  Dx Date:  July 2022  Disease type/distribution:  Crohn's disease/ileum, cecum, ascending colon involvement  Current Treatment:  Stelara 90 mg every 8 weeks Start Date:  August 7, 2023 Response:  Unknown  Optimized:  Pending  Adverse reactions:  None  Prior surgeries:  None  CRP Elevation: Y   calprotectin:  Elevated with active disease  Disease Complications:  Asymptomatic strictures  Extraintestinal manifestations:  Possible joint pains  Prior treatments:   Steroids:  None  5ASA:  None  IMM:  None  TNF Inh:  None   Anti-Integrin:  None   IL 12/23:  Stelara-current medication  KASEY Inh:  None    Previous Clinical Trials:  None    Last Colonoscopy:  December 2022-ulceration in the ascending colon, cecum, and terminal ileum; fibrotic changes in the ascending colon and at the ileocecal valve    Other Endoscopies:  None    Imaging:   MRE:  None   CT:  December 2022- Inflammatory changes ileum and right colon but no stricturing or obstructing issues   Other:  No other pertinent imaging    Pertinent Labs:  Lab Results   Component Value Date    SEDRATE 41 (H) 12/06/2022    CRP 29.9 (H) 07/06/2023     No results found for: "TTGIGA", "IGA"  Lab Results   Component Value Date    TSH 0.480 01/11/2022     Lab Results   Component Value Date    EZNPWUDG51LP 20 (L) 07/06/2023    LBYSXLRM62 432 07/06/2023     Lab Results   Component Value Date    HEPBSAG Non-reactive 07/06/2023    HEPBCAB Non-reactive 07/06/2023    HEPCAB Non-reactive 07/06/2023     Lab Results   Component Value Date    PZE77NKVG Negative 01/11/2022     Lab Results   Component Value Date    NIL 0.32288 07/06/2023    MITOGENNIL 9.928 07/06/2023     Lab Results   Component Value Date    TPTMINTERP SEE BELOW 08/01/2022     No results found for: "STOOLCULTURE", "UHLRXQNCBN5G", "FDZNALIDPL5O", "CDIFFICILEAN", "CDIFFTOX", "CDIFFICILEBY"  Lab Results   Component Value Date    CALPROTECTIN 389.8 (H) 08/02/2022 " "      Therapeutic Drug Monitoring Labs:  No results found for: "PROMETH"  No results found for: "ANSADAINIT", "INFLIXIMAB", "INFLIXINTERP"    Vaccinations:  No results found for: "HEPBSAB"  Lab Results   Component Value Date    HEPAIGG Non-reactive 2023     Lab Results   Component Value Date    VARICELLAZOS 2333.00 2023    VARICELLAINT Positive 2023       There is no immunization history on file for this patient.      Review of Systems  Review of Systems   Constitutional:  Negative for chills, fever and weight loss.   HENT:  Negative for sore throat.    Eyes:  Positive for blurred vision. Negative for pain, discharge and redness.   Respiratory:  Negative for cough, shortness of breath and wheezing.    Cardiovascular:  Negative for chest pain, orthopnea and leg swelling.   Gastrointestinal:  Negative for abdominal pain, blood in stool, constipation, diarrhea, heartburn, melena, nausea and vomiting.   Genitourinary:  Negative for dysuria, frequency and urgency.   Musculoskeletal:  Negative for back pain, joint pain and myalgias.   Skin:  Negative for itching and rash.   Neurological:  Negative for focal weakness and seizures.   Endo/Heme/Allergies:  Does not bruise/bleed easily.   Psychiatric/Behavioral:  Negative for depression. The patient is not nervous/anxious.        Medical History:   Past Medical History:   Diagnosis Date    Arthritis     Benign paroxysmal vertigo, bilateral     Crohn's disease     Diabetes mellitus, type 2     Glaucoma suspect of both eyes     History of pericarditis 2021    Hypercholesterolemia     Optic atrophy, unspecified        Surgical History:  Past Surgical History:   Procedure Laterality Date    BRAIN SURGERY  2005     SECTION, CLASSIC      COLONOSCOPY N/A 2022    Procedure: COLONOSCOPY Suprep;  Surgeon: Vishal Mariee MD;  Location: [facility];  Service: Endoscopy;  Laterality: N/A;    COLONOSCOPY N/A 2022    Procedure: COLONOSCOPY;  " Surgeon: Vishal Mariee MD;  Location: [facility];  Service: Endoscopy;  Laterality: N/A;    TUBAL LIGATION         Family History:   Family History   Problem Relation Age of Onset    Diabetes Mother     Cataracts Mother     Glaucoma Mother     Stroke Mother     Heart disease Mother         CHF and aorta aneurysm complications at death    No Known Problems Father         trauma led to death at mid 30s    Diabetes Brother         3 brothers , x1 leukemia, x1 leukemia but had cardiac arrest after medication given, was on hospice; x1 abdominal cancer but uncertain type    Breast cancer Maternal Grandmother     Blindness Neg Hx     Macular degeneration Neg Hx     Retinal detachment Neg Hx     Colon cancer Neg Hx     Ovarian cancer Neg Hx        Social History:   Social History     Tobacco Use    Smoking status: Never    Smokeless tobacco: Never   Substance Use Topics    Alcohol use: No    Drug use: No       Allergies: Reviewed    Home Medications:   Medication List with Changes/Refills   Current Medications    ASPIRIN 81 MG CHEW    Take 81 mg by mouth once daily.    ATROPINE 1% (ISOPTO ATROPINE) 1 % DROP    Place 1 drop into both eyes daily as needed (eye pain).    BD ALCOHOL SWABS PADM        BETA-CAROTENE,A,-VITS C AND E (ANTIOXIDANT ORAL)    Take 1 tablet by mouth. Unsure on brand, white bottle    COMBIGAN 0.2-0.5 % DROP    Place 1 drop into both eyes 2 (two) times a day.    DICLOFENAC SODIUM (VOLTAREN) 1 % GEL    Apply 2 g topically 3 (three) times daily.    FERROUS SULFATE (FEOSOL) TAB TABLET    Take 1 tablet by mouth daily with breakfast. Elemental QD for 6 weeks if iron improved will stop infusions, not started yet    FLUTICASONE PROPIONATE (FLONASE) 50 MCG/ACTUATION NASAL SPRAY    by Each Nostril route as needed.    KETOROLAC 0.5% (ACULAR) 0.5 % DROP    Place 1 drop into the left eye 3 (three) times daily.    LACTOBACILLUS RHAMNOSUS GG (CULTURELLE) 10 BILLION CELL CAPSULE    Take 1 capsule by mouth once  "daily.    LORATADINE (CLARITIN) 10 MG TABLET    Take 1 tablet (10 mg total) by mouth once daily.    MECLIZINE (ANTIVERT) 12.5 MG TABLET    Take 1 tablet by mouth daily as needed.    OZEMPIC 0.25 MG OR 0.5 MG (2 MG/3 ML) PEN INJECTOR    once a week.    PRAVASTATIN (PRAVACHOL) 40 MG TABLET        STELARA 130 MG/26 ML INJECTION    every 8 weeks.    TRUE METRIX GLUCOSE TEST STRIP STRP        WHEAT DEXTRIN (BENEFIBER CLEAR SF, DEXTRIN,) 3 GRAM/3.5 GRAM PWPK    Take 1 Dose by mouth as needed. Per package       Physical Exam:  Vital Signs:  BP (!) 143/89 (BP Location: Right arm, Patient Position: Sitting)   Pulse 108   Temp 97.7 °F (36.5 °C)   Ht 5' 3" (1.6 m)   Wt 78.4 kg (172 lb 13.5 oz)   LMP  (LMP Unknown)   SpO2 99%   BMI 30.62 kg/m²   Body mass index is 30.62 kg/m².    Physical Exam  Vitals and nursing note reviewed.   Constitutional:       General: She is not in acute distress.     Appearance: Normal appearance. She is well-developed. She is not ill-appearing or toxic-appearing.   Eyes:      Conjunctiva/sclera: Conjunctivae normal.      Pupils: Pupils are equal, round, and reactive to light.   Neck:      Thyroid: No thyromegaly.   Cardiovascular:      Rate and Rhythm: Normal rate and regular rhythm.      Heart sounds: Normal heart sounds. No murmur heard.  Pulmonary:      Effort: Pulmonary effort is normal.      Breath sounds: Normal breath sounds. No wheezing or rales.   Abdominal:      General: Bowel sounds are normal. There is no distension.      Palpations: Abdomen is soft. There is no mass.      Tenderness: There is no abdominal tenderness.   Musculoskeletal:         General: No tenderness. Normal range of motion.      Right lower leg: No edema.      Left lower leg: No edema.   Lymphadenopathy:      Cervical: No cervical adenopathy.   Skin:     Findings: No erythema or rash.   Neurological:      General: No focal deficit present.      Mental Status: She is alert and oriented to person, place, and time. "   Psychiatric:         Mood and Affect: Mood normal.         Behavior: Behavior normal.         Thought Content: Thought content normal.         Judgment: Judgment normal.         Labs: reviewed and pertinent noted above    Assessment/Plan:  Giovanna Medina is a 53 y.o. female with ileocolonic Crohn's disease. The following issues were addresssed:    1. Crohn's disease of both small and large intestine without complication    2. Iron deficiency anemia due to chronic blood loss      1. Crohn's disease:  She has not noticed any dramatic change in her symptoms but her iron deficiency has improved since starting Stelara.  I am hopeful that this may be a sign of improvement in her Crohn's disease activity.  Today I recommend that we update labs including a repeat CRP.  We will also arrange for stool calprotectin level.  If this has improved we will continue with Stelara and plan for a colonoscopy in February or March.  If there has not been any improvement in the inflammatory markers we will plan to check a Stelara level before her next dose.    2. Iron deficiency anemia:  Have improved dramatically in spite of not receiving oral or intravenous iron.  I suspect this may be related to improvement in her Crohn's disease.  Continue to monitor.       # IBD specific health maintenance:  Colon cancer surveillance:  Up-to-date    Annual:  - Eye exam:  Up-to-date  - Skin exam (if on IMM/TNF):  Discuss in the future  - reminded pt to use sunblock/hats/sunprotective clothing  - PAP (if immunosuppressed):  Discuss in the future     DEXA:  Not applicable    Vitamin D:  Low.  Discuss supplementation in the future    Vaccines:    Influenza:  Recommend annual vaccination-patient declines today   Pneumovax:  Discuss in the future   HAV:  Discuss in the future   HBV:  Discuss in the future   Tdap:  Review in the future   MMR:  Immune   VZV:  Immune   HZV:  Discuss in the future   HPV:  Not applicable   Meningococcus:  Not  applicable    Follow up: Follow up in about 6 months (around 6/14/2024).    Thank you again for sending Giovanna Medina to see Dr. Oneil Marie today at the [facility]. Please don't hesitate to contact Dr. Marie if there are any questions regarding this evaluation, or if you have any other patients with inflammatory bowel disease for whom you would like a consultation. You can reach Dr. Marie at [phone] or by email at [email or web address]    James Marie MD  Department of Gastroenterology  Inflammatory Bowel Disease

## 2023-12-21 ENCOUNTER — PATIENT MESSAGE (OUTPATIENT)
Dept: ORTHOPEDICS | Facility: CLINIC | Age: 53
End: 2023-12-21

## 2023-12-27 ENCOUNTER — TELEPHONE (OUTPATIENT)
Dept: GASTROENTEROLOGY | Facility: CLINIC | Age: 53
End: 2023-12-27
Payer: MEDICARE

## 2023-12-27 DIAGNOSIS — K50.80 CROHN'S DISEASE OF BOTH SMALL AND LARGE INTESTINE WITHOUT COMPLICATION: Primary | ICD-10-CM

## 2023-12-27 NOTE — TELEPHONE ENCOUNTER
Attempted to call patient got recording that she is not accepting calls at this time, unable to leave voicemail, will send portal and follow up later    ----- Message from James Marie MD sent at 12/27/2023  4:26 PM CST -----  I would like to get her set up for a Stelara level before her next dose.  Us also work on getting her set up for a colonoscopy in March or April.  Thank you.

## 2023-12-29 ENCOUNTER — TELEPHONE (OUTPATIENT)
Dept: GASTROENTEROLOGY | Facility: CLINIC | Age: 53
End: 2023-12-29
Payer: MEDICARE

## 2024-01-09 ENCOUNTER — TELEPHONE (OUTPATIENT)
Dept: GASTROENTEROLOGY | Facility: CLINIC | Age: 54
End: 2024-01-09
Payer: MEDICARE

## 2024-01-09 ENCOUNTER — TELEPHONE (OUTPATIENT)
Dept: ENDOSCOPY | Facility: HOSPITAL | Age: 54
End: 2024-01-09
Payer: MEDICARE

## 2024-01-09 DIAGNOSIS — K51.919 ULCERATIVE COLITIS WITH COMPLICATION, UNSPECIFIED LOCATION: Primary | ICD-10-CM

## 2024-01-09 DIAGNOSIS — Z12.11 ENCOUNTER FOR SCREENING COLONOSCOPY: Primary | ICD-10-CM

## 2024-01-09 RX ORDER — SODIUM, POTASSIUM,MAG SULFATES 17.5-3.13G
1 SOLUTION, RECONSTITUTED, ORAL ORAL DAILY
Qty: 1 KIT | Refills: 0 | Status: SHIPPED | OUTPATIENT
Start: 2024-01-09 | End: 2024-01-11

## 2024-01-09 NOTE — TELEPHONE ENCOUNTER
Called [facility] and spoke with Nighat  - unclear how [facility] received script  - confirmed that pt receives her injections through [facility]   - [facility] will not proceed with the fill.  - confirmed with report from [facility] that pt is scheduled for next dose on 1/22

## 2024-01-09 NOTE — TELEPHONE ENCOUNTER
Spoke to patient to schedule procedure(s) Colonoscopy       Physician to perform procedure(s) Dr. DENEEN Marie  Date of Procedure (s) 3/22/24  Arrival Time 9:05 AM  Time of Procedure(s) 10:05 AM   Location of Procedure(s) [facility] [address] Floor  Type of Rx Prep sent to patient: Suprep  Instructions provided to patient via [facility]    Patient was informed on the following information and verbalized understanding. Screening questionnaire reviewed with patient and complete. If procedure requires anesthesia, a responsible adult needs to be present to accompany the patient home, patient cannot drive after receiving anesthesia. Appointment details are tentative, especially check-in time. Patient will receive a prep-op call 7 days prior to confirm check-in time for procedure. If applicable the patient should contact their pharmacy to verify Rx for procedure prep is ready for pick-up. Patient was advised to call the scheduling department at [phone] if pharmacy states no Rx is available. Patient was advised to call the endoscopy scheduling department if any questions or concerns arise.      [facility] Scheduling Department

## 2024-01-09 NOTE — TELEPHONE ENCOUNTER
"----- Message from Kasia Epps RN sent at 2024 10:48 AM CST -----  Procedure: Colonoscopy    Diagnosis: Crohn's    Procedure Timin-12 weeks around     #If within 4 weeks selected, please meryl as high priority#    #If greater than 12 weeks, please select "5-12 weeks" and delay sending until 2 months prior to requested date#     Provider: Dr. Marie    Location: [address]    Additional Scheduling Information: No scheduling concerns    Prep Specifications:Standard prep    Have you attached a patient to this message: yes     "

## 2024-01-09 NOTE — TELEPHONE ENCOUNTER
----- Message from Kasia Epps RN sent at 1/9/2024  2:57 PM CST -----  Regarding: FW: call back  Contact: [phone]  Can you call on this  ----- Message -----  From: Lisa Uribe  Sent: 1/9/2024   2:53 PM CST  To: Frank GUERRA Staff  Subject: call back                                        [facility]  RX calling in requesting call a back regarding medication can be drop off ar DR office please call to discuss Further

## 2024-01-09 NOTE — TELEPHONE ENCOUNTER
"----- Message from Kasia Epps RN sent at 2024 10:48 AM CST -----  Procedure: Colonoscopy    Diagnosis: Crohn's    Procedure Timin-12 weeks around     #If within 4 weeks selected, please meryl as high priority#    #If greater than 12 weeks, please select "5-12 weeks" and delay sending until 2 months prior to requested date#     Provider: Dr. Marie    Location: [address]    Additional Scheduling Information: No scheduling concerns    Prep Specifications:Standard prep    Have you attached a patient to this message: yes     "

## 2024-01-19 ENCOUNTER — LAB VISIT (OUTPATIENT)
Dept: LAB | Facility: HOSPITAL | Age: 54
End: 2024-01-19
Attending: INTERNAL MEDICINE
Payer: MEDICARE

## 2024-01-19 DIAGNOSIS — K50.80 CROHN'S DISEASE OF BOTH SMALL AND LARGE INTESTINE WITHOUT COMPLICATION: ICD-10-CM

## 2024-01-19 PROCEDURE — 36415 COLL VENOUS BLD VENIPUNCTURE: CPT | Mod: HCWC | Performed by: INTERNAL MEDICINE

## 2024-01-19 PROCEDURE — 83520 IMMUNOASSAY QUANT NOS NONAB: CPT | Mod: HCWC | Performed by: INTERNAL MEDICINE

## 2024-01-24 LAB
FUAUA DOSE (IN MG): NORMAL
FUAUA INTERVAL (IN WEEKS): 8
USTEKINUMAB AB SERPL IA-ACNC: <10 AU/ML
USTEKINUMAB SERPL IA-MCNC: 1.8 MCG/ML

## 2024-01-26 ENCOUNTER — TELEPHONE (OUTPATIENT)
Dept: GASTROENTEROLOGY | Facility: CLINIC | Age: 54
End: 2024-01-26
Payer: MEDICARE

## 2024-01-26 NOTE — TELEPHONE ENCOUNTER
----- Message from Brianne Hunter NP sent at 1/24/2024  1:18 PM CST -----    ----- Message -----  From: [facility]  Sent: 1/19/2024   7:30 PM CST  To: James Marie MD

## 2024-01-26 NOTE — TELEPHONE ENCOUNTER
- 53 year old with history of Crohn's disease/ileum, cecum, ascending colon involvement diagnosed in 7/2022  - currently on stelara 90mg SC every 8 weeks (started 8/7/2023, LD 1/19, ND 3/15) - goes to [facility]  - stool deirdre from 12/15/2023 improved from 389 to 130, but remains elevated  - CRP also improved from 29.9 on 7/6/23 to 10.9 on 12/14/23, remains elevated  - recent UST trough level of 1.8 with no Ab is subtherapeutic, which warrants for dose adjustment  - plan to optimize dose from stelara 90mg every 8 weeks to every 6 weeks  - case discussed with Dr. Mckinnon        Called and spoke with pt  - reviewed drug levels and inflammatory markers  - discussed plan of care for dose optimization  - pt hesitant to change frequency due to side effect profile  - assured pt that dose adjustment to q6w should not increase risk of side effects  - discussed safety profile stelara and explained need of adjustment to get disease under control and prevent complications in the future.   - pt would like to think about her decision and would like to hear from Dr. Marie   - will relay message to Dr. Marie

## 2024-01-29 ENCOUNTER — PATIENT MESSAGE (OUTPATIENT)
Dept: GASTROENTEROLOGY | Facility: CLINIC | Age: 54
End: 2024-01-29
Payer: MEDICARE

## 2024-02-09 ENCOUNTER — TELEPHONE (OUTPATIENT)
Dept: ORTHOPEDICS | Facility: CLINIC | Age: 54
End: 2024-02-09
Payer: MEDICARE

## 2024-02-09 DIAGNOSIS — M79.642 LEFT HAND PAIN: Primary | ICD-10-CM

## 2024-02-12 ENCOUNTER — HOSPITAL ENCOUNTER (OUTPATIENT)
Dept: RADIOLOGY | Facility: HOSPITAL | Age: 54
Discharge: HOME | End: 2024-02-12
Attending: ORTHOPAEDIC SURGERY
Payer: MEDICARE

## 2024-02-12 ENCOUNTER — OFFICE VISIT (OUTPATIENT)
Dept: ORTHOPEDICS | Facility: CLINIC | Age: 54
End: 2024-02-12
Payer: MEDICARE

## 2024-02-12 VITALS — HEIGHT: 63 IN | BODY MASS INDEX: 30.62 KG/M2

## 2024-02-12 DIAGNOSIS — M79.642 LEFT HAND PAIN: ICD-10-CM

## 2024-02-12 DIAGNOSIS — M65.4 DE QUERVAIN'S TENOSYNOVITIS, LEFT: Primary | ICD-10-CM

## 2024-02-12 PROCEDURE — 73130 X-RAY EXAM OF HAND: CPT | Mod: TC,PN,LT

## 2024-02-12 PROCEDURE — 1159F MED LIST DOCD IN RCRD: CPT | Mod: CPTII,S$GLB,, | Performed by: ORTHOPAEDIC SURGERY

## 2024-02-12 PROCEDURE — 73130 X-RAY EXAM OF HAND: CPT | Mod: 26,LT,, | Performed by: RADIOLOGY

## 2024-02-12 PROCEDURE — 99999 PR PBB SHADOW E&M-EST. PATIENT-LVL III: CPT | Mod: PBBFAC,,, | Performed by: ORTHOPAEDIC SURGERY

## 2024-02-12 PROCEDURE — 20550 NJX 1 TENDON SHEATH/LIGAMENT: CPT | Mod: LT,S$GLB,, | Performed by: ORTHOPAEDIC SURGERY

## 2024-02-12 PROCEDURE — 99213 OFFICE O/P EST LOW 20 MIN: CPT | Mod: 25,S$GLB,, | Performed by: ORTHOPAEDIC SURGERY

## 2024-02-12 PROCEDURE — 3008F BODY MASS INDEX DOCD: CPT | Mod: CPTII,S$GLB,, | Performed by: ORTHOPAEDIC SURGERY

## 2024-02-12 RX ORDER — TRIAMCINOLONE ACETONIDE 40 MG/ML
20 INJECTION, SUSPENSION INTRA-ARTICULAR; INTRAMUSCULAR
Status: COMPLETED | OUTPATIENT
Start: 2024-02-12 | End: 2024-02-12

## 2024-02-12 RX ADMIN — TRIAMCINOLONE ACETONIDE 20 MG: 40 INJECTION, SUSPENSION INTRA-ARTICULAR; INTRAMUSCULAR at 11:02

## 2024-02-12 NOTE — PROGRESS NOTES
Subjective:      Patient ID: Giovanna Medina is a 53 y.o. female.    Chief Complaint: Pain of the Left Hand      HPI  Giovanna Medina is a  53 y.o. female presenting today for wrist pain left wrist.  There was not a history of trauma.  Onset of symptoms began several months ago  She had a similar problem several years ago treated successfully with injection  She is now having pain in the left wrist near the base of left thumb symptoms worse with use but she also has occasionally has numbness tingling in both hands usually at night.      Review of patient's allergies indicates:   Allergen Reactions    Codeine Anaphylaxis    Nsaids (non-steroidal anti-inflammatory drug)     Aspirin          Current Outpatient Medications   Medication Sig Dispense Refill    atropine 1% (ISOPTO ATROPINE) 1 % Drop Place 1 drop into both eyes daily as needed (eye pain). 5 mL 3    BD ALCOHOL SWABS PadM       beta-carotene,A,-vits C and E (ANTIOXIDANT ORAL) Take 1 tablet by mouth. Unsure on brand, white bottle      diclofenac sodium (VOLTAREN) 1 % Gel Apply 2 g topically 3 (three) times daily. 100 g 2    ferrous sulfate (FEOSOL) Tab tablet Take 1 tablet by mouth daily with breakfast. Elemental QD for 6 weeks if iron improved will stop infusions, not started yet      fluticasone propionate (FLONASE) 50 mcg/actuation nasal spray by Each Nostril route as needed.      ketorolac 0.5% (ACULAR) 0.5 % Drop Place 1 drop into the left eye 3 (three) times daily. 10 mL 2    Lactobacillus rhamnosus GG (CULTURELLE) 10 billion cell capsule Take 1 capsule by mouth once daily.      meclizine (ANTIVERT) 12.5 mg tablet Take 1 tablet by mouth daily as needed.      OZEMPIC 0.25 mg or 0.5 mg (2 mg/3 mL) pen injector once a week.      pravastatin (PRAVACHOL) 40 MG tablet       STELARA 130 mg/26 mL injection every 8 weeks.      TRUE METRIX GLUCOSE TEST STRIP Strp       wheat dextrin (BENEFIBER CLEAR SF, DEXTRIN,) 3 gram/3.5 gram PwPk Take 1 Dose by mouth as  "needed. Per package      aspirin 81 MG Chew Take 81 mg by mouth once daily.      COMBIGAN 0.2-0.5 % Drop Place 1 drop into both eyes 2 (two) times a day. 45 mL 3    loratadine (CLARITIN) 10 mg tablet Take 1 tablet (10 mg total) by mouth once daily. 30 tablet 0     No current facility-administered medications for this visit.       Past Medical History:   Diagnosis Date    Arthritis     Benign paroxysmal vertigo, bilateral     Crohn's disease     Diabetes mellitus, type 2     Glaucoma suspect of both eyes     History of pericarditis 2021    Hypercholesterolemia     Optic atrophy, unspecified        Past Surgical History:   Procedure Laterality Date    BRAIN SURGERY  2005     SECTION, CLASSIC      COLONOSCOPY N/A 2022    Procedure: COLONOSCOPY Suprep;  Surgeon: Vishal Mariee MD;  Location: [facility] ENDO;  Service: Endoscopy;  Laterality: N/A;    COLONOSCOPY N/A 2022    Procedure: COLONOSCOPY;  Surgeon: Vishal Mariee MD;  Location: [facility] ENDO;  Service: Endoscopy;  Laterality: N/A;    TUBAL LIGATION         Review of Systems:  ROS    OBJECTIVE:     PHYSICAL EXAM:  Height: 5' 3" (160 cm)    Vitals:    24 1135   Height: 5' 3" (1.6 m)   PainSc:   5   PainLoc: Hand     Well developed, well nourished female in no acute distress  Alert and oriented x 3  HEENT- Normal exam  Lungs- Clear to auscultation  Heart- Regular rate and rhythm  Abdomen- Soft nontender  Extremity exam- examination left hand there is mild swelling over the 1st dorsal compartment positive tenderness positive Finkelstein test   Tinel sign also positive both wrists with slight swelling range of motion fingers full no triggering    RADIOGRAPHS:  None  Comments: I have personally reviewed the imaging and I agree with the above radiologist's report.    ASSESSMENT/PLAN:     IMPRESSION:  1. De Quervain tendinitis left wrist.      2. Mild bilateral carpal tunnel syndrome    PLAN:  I explained the nature of these 2 problems the " patient   I recommended injection for the left wrist   After pause for time-out identified the 1st dorsal compartment injected with Kenalog 20 mg 0.5 cc xylocaine sterile technique  Tolerated the procedure well without complication   I have also given her a wrist braces for nighttime use for treatment of carpal tunnel   If symptoms persist we may consider surgical treatment left wrist   Follow-up 4-6 weeks       - We talked at length about the anatomy and pathophysiology of   Encounter Diagnosis   Name Primary?    De Quervain's tenosynovitis, left Yes           Disclaimer: This note has been generated using voice-recognition software. There may be typographical errors that have been missed during proof-reading.

## 2024-03-06 ENCOUNTER — OFFICE VISIT (OUTPATIENT)
Dept: HEMATOLOGY/ONCOLOGY | Facility: CLINIC | Age: 54
End: 2024-03-06
Payer: MEDICARE

## 2024-03-06 DIAGNOSIS — K50.00 CROHN'S DISEASE OF SMALL INTESTINE WITHOUT COMPLICATION: ICD-10-CM

## 2024-03-06 DIAGNOSIS — D50.0 IRON DEFICIENCY ANEMIA DUE TO CHRONIC BLOOD LOSS: ICD-10-CM

## 2024-03-06 DIAGNOSIS — Z80.9 FAMILY HISTORY OF CANCER: ICD-10-CM

## 2024-03-06 DIAGNOSIS — E11.9 TYPE 2 DIABETES MELLITUS WITHOUT COMPLICATION, WITHOUT LONG-TERM CURRENT USE OF INSULIN: ICD-10-CM

## 2024-03-06 DIAGNOSIS — I30.1 ACUTE VIRAL PERICARDITIS: ICD-10-CM

## 2024-03-06 DIAGNOSIS — D75.839 THROMBOCYTOSIS: Primary | ICD-10-CM

## 2024-03-06 DIAGNOSIS — D84.821 DRUG-INDUCED IMMUNODEFICIENCY: ICD-10-CM

## 2024-03-06 DIAGNOSIS — E79.0 HYPERURICEMIA: ICD-10-CM

## 2024-03-06 DIAGNOSIS — Z79.899 DRUG-INDUCED IMMUNODEFICIENCY: ICD-10-CM

## 2024-03-06 DIAGNOSIS — E78.00 ELEVATED CHOLESTEROL: ICD-10-CM

## 2024-03-06 PROCEDURE — 99213 OFFICE O/P EST LOW 20 MIN: CPT | Mod: HCWC,95,, | Performed by: NURSE PRACTITIONER

## 2024-03-06 PROCEDURE — 1160F RVW MEDS BY RX/DR IN RCRD: CPT | Mod: HCWC,CPTII,95, | Performed by: NURSE PRACTITIONER

## 2024-03-06 PROCEDURE — 1159F MED LIST DOCD IN RCRD: CPT | Mod: HCWC,CPTII,95, | Performed by: NURSE PRACTITIONER

## 2024-03-06 NOTE — PROGRESS NOTES
Answers submitted by the patient for this visit:  Review of Systems Questionnaire (Submitted on 3/5/2024)  appetite change : No  unexpected weight change: No  mouth sores: No  visual disturbance: No  cough: No  shortness of breath: No  chest pain: No  abdominal pain: No  diarrhea: No  frequency: No  back pain: No  rash: No  headaches: No  adenopathy: No  nervous/ anxious: No      Patient ID: Giovanna Medina is a 53 y.o. female.    Chief Complaint: Thrombocytosis, ALMA    The patient location is: [place], [place]  The chief complaint leading to consultation is: thrombocytosis, iron deficiency    Visit type: audiovisual    Face to Face time with patient: 9 mins  27 minutes of total time spent on the encounter, which includes face to face time and non-face to face time preparing to see the patient (eg, review of tests), Obtaining and/or reviewing separately obtained history, Documenting clinical information in the electronic or other health record, Independently interpreting results (not separately reported) and communicating results to the patient/family/caregiver, or Care coordination (not separately reported).         Each patient to whom he or she provides medical services by telemedicine is:  (1) informed of the relationship between the physician and patient and the respective role of any other health care provider with respect to management of the patient; and (2) notified that he or she may decline to receive medical services by telemedicine and may withdraw from such care at any time.    Notes:         History     HPI    Giovanna Medina is a 52yr old female referred to hematology for evaluation of thrombocytosis by personal referral. Daughter being seen for ALMA by Dr CHRISTIANO Macdonald in this clinic and pt wanted to be seen by same group. Comorbidities include diabetes, sees optho for concern of glaucoma, sacroiliac arthritis (diagnosis age 21yrs), vertigo, hypercholesterolemia, has history of viral induced pericarditis  "in 2021 and has followed pulmonology and cardiology for this.  Pt states A1c improved to 5.9 from 7.9 with intermittent metformin usage, weight and diet management which she is continuing to work on. She is currently not on pravastatin for cholesterol stating her cardiologist is giving her a chance as well with weight and diet management. Currently has completed follow up with pulmonology, on  [facility]. Pt did complete a screening colonoscopy today 22 which per pt report she has "small ulcers in colon, they said they don't think its cancer but sending specimens to check". Will follow up on this. Pt was on colchicine and TID ibuprofen for  Pericarditis, discontinued 2022 after DUB. She is on a daily baby asa since pericarditis diagnosis. Pericarditis etiology was suspected as viral illness exposure with grandchildren. Covid diagnosis 2021.  Concern for nodule in long on CXR 3/2022 was clarified as old consolidation, improved, on recommended CT chest with cardiology and pulmonology chart review as well as radiography results from [facility] provider office visits. Pericarditis was noted as resolved, EF noted as normal.     Family history includes brother  with history of leukemia but death related to medication response/cardiac arrest; one brother had a nasal cancer with surgical/radiation/chemo management and is currently living; another brother  due to leukemia; another brother  after a type of abdominal cancer,  mother  with CHF complications and aneurysm to heart age 60y, father  due to trauma complications in his mid 30s. Pt's daughter has ALMA.    Pt denies fatigue, flushing, itching, chest pain, sob/carter, leg swelling, n/v/d, abdominal pain, abdominal swelling, hemoptysis, hematemesis, melena, epistaxis, easy bleeding. She has had some intentional weight loss with trying to improve diabetes and cholesterol " management.     INTERVAL  -here for thrombocytosis and iron deficiency follow up  - Pt did not want iron infusions 2023, preferred to try oral for a longer period of time  -history of uterine biopsy negative 2022  -GI colonoscopy 22 with ulcers, negative biopsy, repeat colonoscopy 22 with repeat biopsies diverticulosis sigmoid, crohn's disease with ileitis and colitis, severe inflammation, she has not had any notable gi blood loss she has observed  - started Stelara for Crohns 2023, repeat colonoscopy scheduled 3/22/24 for medication evaluation  -reports she is feeling really good, fatigue resolved, denies chest pain, sob, abdominal pain, n/v/d, hematemesis, melena, weight loss, nightsweats  -remains on ozempic for diabetes and weight stable lower  170s# but states she has been slacking on eating right and taking oral iron       Past medical, surgical, and medication history, past family history reviewed and uptdated with patient today as noted.      Past Medical History:   Diagnosis Date    Arthritis     Benign paroxysmal vertigo, bilateral     Crohn's disease     Diabetes mellitus, type 2     Glaucoma suspect of both eyes     History of pericarditis 2021    Hypercholesterolemia     Optic atrophy, unspecified        Past Surgical History:   Procedure Laterality Date    BRAIN SURGERY  2005     SECTION, CLASSIC      COLONOSCOPY N/A 2022    Procedure: COLONOSCOPY Suprep;  Surgeon: Vishal Mariee MD;  Location: [facility];  Service: Endoscopy;  Laterality: N/A;    COLONOSCOPY N/A 2022    Procedure: COLONOSCOPY;  Surgeon: Vishal Mariee MD;  Location: [facility];  Service: Endoscopy;  Laterality: N/A;    TUBAL LIGATION         Oncology History:  Oncology History    No history exists.        Review of Systems:  Review of Systems   Constitutional:  Negative for activity change, appetite change, chills, fatigue, fever and unexpected weight change.   HENT:  Negative for nasal  congestion, mouth sores and nosebleeds.    Eyes:  Negative for photophobia and visual disturbance.   Respiratory:  Negative for cough, chest tightness and shortness of breath.    Cardiovascular:  Negative for chest pain, palpitations and leg swelling.   Gastrointestinal:  Negative for abdominal pain, blood in stool, change in bowel habit, constipation, diarrhea, nausea and vomiting.   Genitourinary:  Negative for frequency, hematuria and menstrual problem.        Postmenopausal   Musculoskeletal:  Negative for back pain, gait problem, joint swelling and myalgias.   Integumentary:  Negative for pallor and rash.   Neurological:  Negative for dizziness, syncope, weakness and headaches.   Hematological:  Negative for adenopathy. Does not bruise/bleed easily.   Psychiatric/Behavioral:  Negative for sleep disturbance. The patient is not nervous/anxious.           Physical Exam   Vitals:  LMP  (LMP Unknown)     PHYSICAL EXAM  Limited as this is telehealth  Pt in car front passenger with daughter and grandchildren, visible on screen, talkative with NAD, no sob.     Labs:     Lab Results   Component Value Date    WBC 7.60 03/01/2024    HGB 12.9 03/01/2024    HCT 39.6 03/01/2024    MCV 84 03/01/2024     03/01/2024       Lab Results   Component Value Date    IRON 50 03/01/2024    TRANSFERRIN 257 03/01/2024    TIBC 380 03/01/2024    FESATURATED 13 (L) 03/01/2024      Lab Results   Component Value Date    FERRITIN 77 03/01/2024     T sat 9.66% c/w ALMA          EGD, colonoscopy 12/7/22 with Dr Mariee  Impression:    - Diverticulosis in the sigmoid colon.                          - Crohn's disease with ileitis and colitis.                          Inflammation was found. This was severe. Biopsied.                          - Crohn's disease, with ileitis and colitis.                          Inflammation was found. This was severe, unchanged                          compared to previous examinations. Biopsied.                           - A few erosions in the sigmoid colon. Biopsied.                          - The examination was otherwise normal on direct                          and retroflexion views.                          Exam today( off all nsaids) shows at least similar                          if not worse inflammatory changes mainly in the TI                          and cecum / IC valve, prox ascending colon. There                          is possible tiny pinhole fistulous tract near the                          IC valve, and there may be more upstream TI                          stricturing, could not traverse above about 15 cm                          in the TI, but this could be from looping as well.                          This is most consistent with Crohns disease, with                          significant activity, and I think she warrants                          therapy.       CT CHEST WITHOUT CONTRAST 3/18/22  IMPRESSION:     PATCHY SUBPLEURAL AIRSPACE CONSOLIDATION IN THE POSTERIOR RIGHT LOWER LOBE CORRESPONDS TO THE ABNORMALITY SEEN ON RECENT CHEST X-RAY. THIS MAY REPRESENT RESOLVING ATELECTASIS, PNEUMONIA, ETC. THIS APPEARANCE HAS SIGNIFICANTLY IMPROVED COMPARED TO CT SCAN OF CHEST DATED 12/04/2021.     Electronically Signed By: Elian Turner MD 3/18/2022 11:23 AM CDT      TRANSTHORACIC ECHOCARDIOGRAM 3/8/22  Interpretation Summary   Ejection Fraction = 60-65%.   No regional wall motion abnormalities noted.   The left ventricular size, thickness and function are normal   There is no left ventricular diastolic dysfunction.   normal filling pressures   There is no pericardial effusion.   Previous echo 12/2021 had moderate pericardial effusion.     CHEST XRAY 3/8/22  IMPRESSION:   Possible new 1 cm pulmonary nodule lateral right lung base as described above. This was not present on 1/1/2022. Consider CT chest.          Physical Exam:  Physical Exam   Constitutional: She is oriented to person, place, and time. She  appears obese.  Non-toxic appearance. No distress. She does not appear ill.   HENT:   Head: Normocephalic and atraumatic.   Mouth/Throat: Mucous membranes are moist. Oropharynx is clear.   Eyes: Conjunctivae are normal. No scleral icterus.   Cardiovascular: Normal rate, regular rhythm, normal heart sounds and normal pulses.   No murmur heard.Pulmonary:      Effort: Pulmonary effort is normal.      Breath sounds: Normal breath sounds.     Abdominal: Soft. Bowel sounds are normal. She exhibits no distension. There is no abdominal tenderness. There is no guarding.   Musculoskeletal:      Cervical back: Normal range of motion and neck supple. No rigidity.      Right lower leg: No edema.      Left lower leg: No edema.   Neurological: She is alert and oriented to person, place, and time. Gait normal.   Skin: Skin is warm and dry. No bruising noted. She is not diaphoretic. No jaundice or pallor.   Psychiatric: Her behavior is normal. Mood normal.   Nursing note and vitals reviewed.        ECOG:   ECOG SCORE    0 - Fully active-able to carry on all pre-disease performance without restriction          Discussion     Problem List:  Problem List Items Addressed This Visit          Immunology/Multi System    Drug-induced immunodeficiency       Oncology    Iron deficiency anemia due to chronic blood loss    Thrombocytosis - Primary     Other Visit Diagnoses       Hyperuricemia        Acute viral pericarditis        Type 2 diabetes mellitus without complication, without long-term current use of insulin        Elevated cholesterol        Crohn's disease of small intestine without complication        Family history of cancer                         Thrombocytosis, microcytic anemia, ALMA  - Pt with evidence thrombocytosis as early as 2009 by review in [facility]. Most recent labs are 1/2022 post pericarditis and covid illness which could also have affected platelet values.  - Neg Hep panel, RPR, TSH 1/11/2022.   - Pt denies any symptoms  at present.  - She did have a screening colonoscopy today and as above, she states she was told she had small ulcers. Pending full report. If ulcers, there could be occult blood loss, consideration of ALMA which would additionally cause a reactive thrombocytosis.   - Alex 2 with reflex, BCR:ABL1, retics, hgb electrophoresis 7/2022 normal   - Advised to reduce seafood to 1-2x/week given uric acid, if remains elevated will consider allopurinol.   - 3/10/23 labs with normal H/H, mild microcytic evidence, platelets with slight increase to 486 K/uL, iron studies stable with low iron sat otherwise normal iron level and ferritin.  - Noncompliant with oral iron, likely poor absorption as well as some chronic blood loss re gi inflammation with crohns  - Labs 10/17/23 hgb stable/normal 13.1 g/dL, mild microcytic evidence, normal platelets, iron and iron sat have improved iron normal 70 ug/dL, iron sat increased from 10% (6/2023) to 19%, ferritin has decreased to low normal 56 ng/mL. T sat calculated at 18.72% c/w ALMA.  - 3/1/24 normal h/h, normocytic,  normal platelets, iron sat decreased to 13% and ferritin normal, she will restart oral iron  - educated on constipation prevention with iron  - RTC 4 months with labs and follow up appt      Hyperuricemia  -uric acid elevation, has now decreased to 6.3 mg/dL  -eats shellfish less frequently  -has been on colchicine in past, now stopped with GI concerns  -continue to monitor    Type 2 Diabetes  - taking ozempic as prescribed by pcp  - lost 30#, stable 170s#  - Management deferred to pcp     Acute viral pericarditis  - 12/2021 diagnosis, resolution noted in cardiology and pulmonology follow up with repeated cxr and CT as ntoed above  - considered likely viral related given she cares for several grandchildren, many uris with grandchildren  - cardiology follow up in 1yr as scheduled 3/2022, pulmonology prn    Elevated Cholesterol  - Pt states she is working on lifestyle changes,  weight reduction   - Not currently taking pravastatin, reports this was in agreement with her cardiologist if she is successful with lifestyle changes.  - Management deferred to cardiology     Crohns small intestine without complication  - no ASA at present due to GI side effects  - has started Stelara treatment, upcoming colonoscopy to monitor effectiveness/residual disease  - no symptoms at present  - management deferred to GI/Dr Marie    Family history of cancer  - genetic testing referral previously      Immunodeficiency due to drug therapy  - No fevers, no s/s of acute illness  - No known exposure to covid or other illness  - Instructed on good handwashing, avoiding known ill individuals, limiting crowd exposure  - Continue to monitor      Advanced Care Planning  - completed 7/13/22    Advanced Care Planning  Advance care planning  - After our discussion at previous visit, the patient has decided to complete a HCPOA and has appointed karson Yeager as primary at [phone], pastor Janes, at [phone] as secondary, and Nitin Washington  brother at [phone] as third.       Juanita Seals, NP-C  [facility]  Hematology/Oncology  [address] [place] [address] [address]  [place] Kraus  [phone]

## 2024-03-11 ENCOUNTER — TELEPHONE (OUTPATIENT)
Dept: ENDOSCOPY | Facility: HOSPITAL | Age: 54
End: 2024-03-11
Payer: MEDICARE

## 2024-03-11 NOTE — TELEPHONE ENCOUNTER
Telephoned pt to review GLP-1 instructions for upcoming colonoscopy on 3/22/24 with no answer.   Left voicemail with direct contact number for pt to return call.  Portal message also sent.

## 2024-03-19 ENCOUNTER — TELEPHONE (OUTPATIENT)
Dept: ENDOSCOPY | Facility: HOSPITAL | Age: 54
End: 2024-03-19
Payer: MEDICARE

## 2024-03-19 DIAGNOSIS — Z12.11 SPECIAL SCREENING FOR MALIGNANT NEOPLASMS, COLON: Primary | ICD-10-CM

## 2024-03-19 RX ORDER — SODIUM, POTASSIUM,MAG SULFATES 17.5-3.13G
1 SOLUTION, RECONSTITUTED, ORAL ORAL DAILY
Qty: 1 KIT | Refills: 0 | Status: ON HOLD | OUTPATIENT
Start: 2024-03-19 | End: 2024-03-22 | Stop reason: HOSPADM

## 2024-03-21 ENCOUNTER — TELEPHONE (OUTPATIENT)
Dept: ENDOSCOPY | Facility: HOSPITAL | Age: 54
End: 2024-03-21
Payer: MEDICARE

## 2024-03-21 NOTE — TELEPHONE ENCOUNTER
Returned pt call. Pt ate small amt of breakfast.pt declined to re-schedule. Encouraged pt to increase fluid intake and monitor bowel movements. Pt will call in am if her bowel movement is not clear.

## 2024-03-22 ENCOUNTER — ANESTHESIA EVENT (OUTPATIENT)
Dept: ENDOSCOPY | Facility: HOSPITAL | Age: 54
End: 2024-03-22
Payer: MEDICARE

## 2024-03-22 ENCOUNTER — ANESTHESIA (OUTPATIENT)
Dept: ENDOSCOPY | Facility: HOSPITAL | Age: 54
End: 2024-03-22
Payer: MEDICARE

## 2024-03-22 ENCOUNTER — HOSPITAL ENCOUNTER (OUTPATIENT)
Facility: HOSPITAL | Age: 54
Discharge: HOME | End: 2024-03-22
Attending: INTERNAL MEDICINE | Admitting: INTERNAL MEDICINE
Payer: MEDICARE

## 2024-03-22 VITALS
TEMPERATURE: 98 F | DIASTOLIC BLOOD PRESSURE: 73 MMHG | RESPIRATION RATE: 18 BRPM | HEART RATE: 80 BPM | WEIGHT: 176 LBS | OXYGEN SATURATION: 100 % | HEIGHT: 63 IN | BODY MASS INDEX: 31.18 KG/M2 | SYSTOLIC BLOOD PRESSURE: 129 MMHG

## 2024-03-22 DIAGNOSIS — K50.90 CROHN'S DISEASE: ICD-10-CM

## 2024-03-22 PROCEDURE — 63600175 PHARM REV CODE 636 W HCPCS: Mod: HCWC | Performed by: NURSE ANESTHETIST, CERTIFIED REGISTERED

## 2024-03-22 PROCEDURE — 45380 COLONOSCOPY AND BIOPSY: CPT | Mod: HCWC,,, | Performed by: INTERNAL MEDICINE

## 2024-03-22 PROCEDURE — 88305 TISSUE EXAM BY PATHOLOGIST: CPT | Mod: 59 | Performed by: PATHOLOGY

## 2024-03-22 PROCEDURE — E9220 PRA ENDO ANESTHESIA: HCPCS | Mod: HCWC,,, | Performed by: NURSE ANESTHETIST, CERTIFIED REGISTERED

## 2024-03-22 PROCEDURE — 45380 COLONOSCOPY AND BIOPSY: CPT | Mod: HCWC | Performed by: INTERNAL MEDICINE

## 2024-03-22 PROCEDURE — 25000003 PHARM REV CODE 250: Mod: HCWC | Performed by: NURSE ANESTHETIST, CERTIFIED REGISTERED

## 2024-03-22 PROCEDURE — 88305 TISSUE EXAM BY PATHOLOGIST: CPT | Mod: 26,HCWC,, | Performed by: PATHOLOGY

## 2024-03-22 PROCEDURE — 37000009 HC ANESTHESIA EA ADD 15 MINS: Performed by: INTERNAL MEDICINE

## 2024-03-22 PROCEDURE — 88342 IMHCHEM/IMCYTCHM 1ST ANTB: CPT | Performed by: PATHOLOGY

## 2024-03-22 PROCEDURE — 88342 IMHCHEM/IMCYTCHM 1ST ANTB: CPT | Mod: 26,HCWC,, | Performed by: PATHOLOGY

## 2024-03-22 PROCEDURE — 37000008 HC ANESTHESIA 1ST 15 MINUTES: Performed by: INTERNAL MEDICINE

## 2024-03-22 PROCEDURE — 27201012 HC FORCEPS, HOT/COLD, DISP: Performed by: INTERNAL MEDICINE

## 2024-03-22 RX ORDER — PROPOFOL 10 MG/ML
VIAL (ML) INTRAVENOUS
Status: DISCONTINUED | OUTPATIENT
Start: 2024-03-22 | End: 2024-03-22

## 2024-03-22 RX ORDER — LIDOCAINE HYDROCHLORIDE 20 MG/ML
INJECTION INTRAVENOUS
Status: DISCONTINUED | OUTPATIENT
Start: 2024-03-22 | End: 2024-03-22

## 2024-03-22 RX ORDER — USTEKINUMAB 90 MG/ML
90 INJECTION, SOLUTION SUBCUTANEOUS
COMMUNITY
End: 2024-03-27 | Stop reason: DRUGHIGH

## 2024-03-22 RX ORDER — SODIUM CHLORIDE 9 MG/ML
INJECTION, SOLUTION INTRAVENOUS CONTINUOUS
Status: DISCONTINUED | OUTPATIENT
Start: 2024-03-22 | End: 2024-03-22 | Stop reason: HOSPADM

## 2024-03-22 RX ADMIN — LIDOCAINE HYDROCHLORIDE 50 MG: 20 INJECTION INTRAVENOUS at 11:03

## 2024-03-22 RX ADMIN — PROPOFOL 100 MG: 10 INJECTION, EMULSION INTRAVENOUS at 11:03

## 2024-03-22 RX ADMIN — SODIUM CHLORIDE: 0.9 INJECTION, SOLUTION INTRAVENOUS at 10:03

## 2024-03-22 NOTE — ANESTHESIA POSTPROCEDURE EVALUATION
Anesthesia Post Evaluation    Patient: Giovanna Medina    Procedure(s) Performed: Procedure(s) (LRB):  COLONOSCOPY (N/A)    Final Anesthesia Type: general      Patient location during evaluation: GI PACU  Patient participation: Yes- Able to Participate  Level of consciousness: awake and alert  Post-procedure vital signs: reviewed and stable  Pain management: adequate  Airway patency: patent    PONV status at discharge: No PONV  Anesthetic complications: no      Cardiovascular status: blood pressure returned to baseline  Respiratory status: unassisted  Hydration status: euvolemic  Follow-up not needed.          Vitals Value Taken Time   /73 03/22/24 1210   Temp 36.6 °C (97.9 °F) 03/22/24 1146   Pulse 80 03/22/24 1210   Resp 18 03/22/24 1210   SpO2 100 % 03/22/24 1210         Event Time   Out of Recovery 12:35:59         Pain/Lee Ann Score: Lee Ann Score: 10 (3/22/2024 11:50 AM)

## 2024-03-22 NOTE — ANESTHESIA PREPROCEDURE EVALUATION
2024  Giovanan Medina is a 53 y.o., female.  Past Medical History:   Diagnosis Date    Arthritis     Benign paroxysmal vertigo, bilateral     Crohn's disease     Diabetes mellitus, type 2     Glaucoma suspect of both eyes     History of pericarditis 2021    Hypercholesterolemia     Optic atrophy, unspecified        Past Surgical History:   Procedure Laterality Date    BRAIN SURGERY  2005     SECTION, CLASSIC      COLONOSCOPY N/A 2022    Procedure: COLONOSCOPY Suprep;  Surgeon: Vishal Mariee MD;  Location: [facility] ENDO;  Service: Endoscopy;  Laterality: N/A;    COLONOSCOPY N/A 2022    Procedure: COLONOSCOPY;  Surgeon: Vishal Mariee MD;  Location: [facility] ENDO;  Service: Endoscopy;  Laterality: N/A;    TUBAL LIGATION             Pre-op Assessment    I have reviewed the Patient Summary Reports.     I have reviewed the Nursing Notes. I have reviewed the NPO Status.   I have reviewed the Medications.     Review of Systems  Anesthesia Hx:  No problems with previous Anesthesia                Social:  Non-Smoker, No Alcohol Use       Hematology/Oncology:  Hematology Normal   Oncology Normal                                   EENT/Dental:  EENT/Dental Normal           Cardiovascular:  Cardiovascular Normal Exercise tolerance: good                                           Pulmonary:  Pulmonary Normal                       Renal/:  Renal/ Normal                 Hepatic/GI:  Hepatic/GI Normal                 Musculoskeletal:  Musculoskeletal Normal                Neurological:  Neurology Normal                                      Endocrine:  Diabetes           Dermatological:  Skin Normal    Psych:  Psychiatric Normal                    Physical Exam  General: Well nourished, Cooperative, Alert and Oriented    Airway:  Mallampati: II / II  Mouth Opening: Normal  TM Distance: 4 - 6  cm  Tongue: Normal  Neck ROM: Normal ROM    Dental:  Intact    Chest/Lungs:  Clear to auscultation, Normal Respiratory Rate    Heart:  Rate: Normal  Rhythm: Regular Rhythm        Anesthesia Plan  Type of Anesthesia, risks & benefits discussed:    Anesthesia Type: Gen Natural Airway  Intra-op Monitoring Plan: Standard ASA Monitors  Post Op Pain Control Plan: multimodal analgesia  Induction:  IV  Informed Consent: Informed consent signed with the Patient and all parties understand the risks and agree with anesthesia plan.  All questions answered.   ASA Score: 2  Day of Surgery Review of History & Physical: H&P Update referred to the surgeon/provider.    Ready For Surgery From Anesthesia Perspective.     .

## 2024-03-22 NOTE — PROVATION PATIENT INSTRUCTIONS
Discharge Summary/Instructions after an Endoscopic Procedure  Patient Name: Giovanna Medina  Patient MRN: [id number]  Patient YOB: 1970 Friday, March 22, 2024  James Marie MD  Dear patient,  As a result of recent federal legislation (The Federal Cures Act), you may   receive lab or pathology results from your procedure in your [facility]   account before your physician is able to contact you. Your physician or   their representative will relay the results to you with their   recommendations at their soonest availability.  Thank you,  RESTRICTIONS:  During your procedure today, you received medications for sedation.  These   medications may affect your judgment, balance and coordination.  Therefore,   for 24 hours, you have the following restrictions:   - DO NOT drive a car, operate machinery, make legal/financial decisions,   sign important papers or drink alcohol.    ACTIVITY:  Today: no heavy lifting, straining or running due to procedural   sedation/anesthesia.  The following day: return to full activity including work.  DIET:  Eat and drink normally unless instructed otherwise.     TREATMENT FOR COMMON SIDE EFFECTS:  - Mild abdominal pain, nausea, belching, bloating or excessive gas:  rest,   eat lightly and use a heating pad.  - Sore Throat: treat with throat lozenges and/or gargle with warm salt   water.  - Because air was used during the procedure, expelling large amounts of air   from your rectum or belching is normal.  - If a bowel prep was taken, you may not have a bowel movement for 1-3 days.    This is normal.  SYMPTOMS TO WATCH FOR AND REPORT TO YOUR PHYSICIAN:  1. Abdominal pain or bloating, other than gas cramps.  2. Chest pain.  3. Back pain.  4. Signs of infection such as: chills or fever occurring within 24 hours   after the procedure.  5. Rectal bleeding, which would show as bright red, maroon, or black stools.   (A tablespoon of blood from the rectum is not serious, especially  if   hemorrhoids are present.)  6. Vomiting.  7. Weakness or dizziness.  GO DIRECTLY TO THE NEAREST EMERGENCY ROOM IF YOU HAVE ANY OF THE FOLLOWING:      Difficulty breathing              Chills and/or fever over 101 F   Persistent vomiting and/or vomiting blood   Severe abdominal pain   Severe chest pain   Black, tarry stools   Bleeding- more than one tablespoon   Any other symptom or condition that you feel may need urgent attention  Your doctor recommends these additional instructions:  If any biopsies were taken, your doctors clinic will contact you in 1 to 2   weeks with any results.  - Discharge patient to home.   - Resume previous diet today.   - Continue present medications.   - Await pathology results.   - Will plan to increase Stelara to every 4 weeks after biopsies are back.  - Repeat colonoscopy in 1 year to assess disease activity.   - Return to my office as previously scheduled.   - Patient has a contact number available for emergencies.  The signs and   symptoms of potential delayed complications were discussed with the   patient.  Return to normal activities tomorrow.  Written discharge   instructions were provided to the patient.  For questions, problems or results please call your physician - James Marie MD at Work:  [phone].  [facility] [place], EMERGENCY ROOM PHONE NUMBER: [phone]  IF A COMPLICATION OR EMERGENCY SITUATION ARISES AND YOU ARE UNABLE TO REACH   YOUR PHYSICIAN - GO DIRECTLY TO THE EMERGENCY ROOM.  James Marie MD  3/22/2024 11:39:42 AM  This report has been verified and signed electronically.  Dear patient,  As a result of recent federal legislation (The Federal Cures Act), you may   receive lab or pathology results from your procedure in your [facility]   account before your physician is able to contact you. Your physician or   their representative will relay the results to you with their   recommendations at their soonest availability.  Thank  you,  PROVATION

## 2024-03-22 NOTE — H&P
Short Stay Endoscopy History and Physical    PCP - Carmina Cordova MD (Cindy)    Procedure - Colonoscopy  ASA - per anesthesia  Mallampati - per anesthesia  History of Anesthesia problems - no  Family history Anesthesia problems - no   Plan of anesthesia - General    HPI:  This is a 53 y.o. female here for evaluation of : Crohn's disease.       ROS:  Constitutional: No fevers, chills, No weight loss  CV: No chest pain  Pulm: No cough, No shortness of breath  GI: see HPI  Derm: No rash    Medical History:  has a past medical history of Arthritis, Benign paroxysmal vertigo, bilateral, Crohn's disease, Diabetes mellitus, type 2, Glaucoma suspect of both eyes, History of pericarditis (2021), Hypercholesterolemia, and Optic atrophy, unspecified.    Surgical History:  has a past surgical history that includes  section, classic; Brain surgery (2005); Tubal ligation; Colonoscopy (N/A, 2022); and Colonoscopy (N/A, 2022).    Family History: family history includes Breast cancer in her maternal grandmother; Cataracts in her mother; Diabetes in her brother and mother; Glaucoma in her mother; Heart disease in her mother; No Known Problems in her father; Stroke in her mother.. Otherwise no colon cancer, inflammatory bowel disease, or GI malignancies.    Social History:  reports that she has never smoked. She has never used smokeless tobacco. She reports that she does not drink alcohol and does not use drugs.    Review of patient's allergies indicates:   Allergen Reactions    Codeine Anaphylaxis    Nsaids (non-steroidal anti-inflammatory drug)     Aspirin        Medications:   Medications Prior to Admission   Medication Sig Dispense Refill Last Dose    ferrous sulfate (FEOSOL) Tab tablet Take 1 tablet by mouth daily with breakfast. Elemental QD for 6 weeks if iron improved will stop infusions, not started yet   Past Week    aspirin 81 MG Chew Take 81 mg by mouth once daily.   More than a month     atropine 1% (ISOPTO ATROPINE) 1 % Drop Place 1 drop into both eyes daily as needed (eye pain). 5 mL 3     BD ALCOHOL SWABS PadM        beta-carotene,A,-vits C and E (ANTIOXIDANT ORAL) Take 1 tablet by mouth. Unsure on brand, white bottle       COMBIGAN 0.2-0.5 % Drop Place 1 drop into both eyes 2 (two) times a day. 45 mL 3     diclofenac sodium (VOLTAREN) 1 % Gel Apply 2 g topically 3 (three) times daily. 100 g 2 Unknown    fluticasone propionate (FLONASE) 50 mcg/actuation nasal spray by Each Nostril route as needed.   More than a month    ketorolac 0.5% (ACULAR) 0.5 % Drop Place 1 drop into the left eye 3 (three) times daily. 10 mL 2     Lactobacillus rhamnosus GG (CULTURELLE) 10 billion cell capsule Take 1 capsule by mouth once daily.   More than a month    loratadine (CLARITIN) 10 mg tablet Take 1 tablet (10 mg total) by mouth once daily. 30 tablet 0     meclizine (ANTIVERT) 12.5 mg tablet Take 1 tablet by mouth daily as needed.   More than a month    OZEMPIC 0.25 mg or 0.5 mg (2 mg/3 mL) pen injector once a week.   2024 at 2300    pravastatin (PRAVACHOL) 40 MG tablet        [] sodium,potassium,mag sulfates (SUPREP BOWEL PREP KIT) 17.5-3.13-1.6 gram SolR Take 177 mLs by mouth once daily. for 2 days 1 kit 0     STELARA 130 mg/26 mL injection every 8 weeks.   More than a month    TRUE METRIX GLUCOSE TEST STRIP Strp        wheat dextrin (BENEFIBER CLEAR SF, DEXTRIN,) 3 gram/3.5 gram PwPk Take 1 Dose by mouth as needed. Per package   More than a month         Physical Exam:    Vital Signs:   Vitals:    24 1005   BP: 126/89   Pulse: 105   Resp: 16   Temp: 97.9 °F (36.6 °C)       General Appearance: Well appearing in no acute distress  Eyes:    No scleral icterus  ENT: Neck supple, Lips, mucosa, and tongue normal; teeth and gums normal  Abdomen: Soft, non tender, non distended with positive bowel sounds. No hepatosplenomegaly, ascites, or mass.  Extremities: 2+ pulses, no clubbing, cyanosis or  edema  Skin: No rash      Labs:  Lab Results   Component Value Date    WBC 7.60 03/01/2024    HGB 12.9 03/01/2024    HCT 39.6 03/01/2024     03/01/2024    ALT 18 12/14/2023    AST 17 12/14/2023     12/14/2023    K 3.6 12/14/2023     12/14/2023    CREATININE 0.8 12/14/2023    BUN 11 12/14/2023    CO2 29 12/14/2023    TSH 0.480 01/11/2022       I have explained the risks and benefits of endoscopy procedures to the patient including but not limited to bleeding, perforation, infection, and death.  The patient was asked if they understand and allowed to ask any further questions to their satisfaction.    Mera Correia, DO

## 2024-03-22 NOTE — TRANSFER OF CARE
"Anesthesia Transfer of Care Note    Patient: Giovanna Medina    Procedure(s) Performed: Procedure(s) (LRB):  COLONOSCOPY (N/A)    Patient location: GI    Anesthesia Type: general    Transport from OR: Transported from OR on room air with adequate spontaneous ventilation    Post pain: adequate analgesia    Post assessment: no apparent anesthetic complications and tolerated procedure well    Post vital signs: stable    Level of consciousness: awake, alert and oriented    Nausea/Vomiting: no nausea/vomiting    Complications: none    Transfer of care protocol was followed      Last vitals: Visit Vitals  /89 (BP Location: Left arm)   Pulse 105   Temp 36.6 °C (97.9 °F) (Temporal)   Resp 16   Ht 5' 3" (1.6 m)   Wt 79.8 kg (176 lb)   LMP  (LMP Unknown)   SpO2 97%   Breastfeeding No   BMI 31.18 kg/m²     "

## 2024-03-27 ENCOUNTER — TELEPHONE (OUTPATIENT)
Dept: GASTROENTEROLOGY | Facility: CLINIC | Age: 54
End: 2024-03-27
Payer: MEDICARE

## 2024-03-27 DIAGNOSIS — K50.80 CROHN'S DISEASE OF BOTH SMALL AND LARGE INTESTINE WITHOUT COMPLICATION: Primary | ICD-10-CM

## 2024-03-27 LAB
FINAL PATHOLOGIC DIAGNOSIS: NORMAL
GROSS: NORMAL
Lab: NORMAL
SUPPLEMENTAL DIAGNOSIS: NORMAL

## 2024-03-27 RX ORDER — USTEKINUMAB 90 MG/ML
90 INJECTION, SOLUTION SUBCUTANEOUS
Qty: 1 ML | Refills: 2 | Status: ACTIVE | OUTPATIENT
Start: 2024-03-27 | End: 2024-04-01 | Stop reason: SDUPTHER

## 2024-03-27 NOTE — TELEPHONE ENCOUNTER
- 53 year old with history of Crohn's disease/ileum, cecum, ascending colon involvement diagnosed in 7/2022   - currently on stelara 90mg SC every 8 weeks (started 8/7/2023, LD 3/15) - goes to [facility]   - attempted to optimize dose in January but pt was resistant at the time and wanted to wait till colonoscopy   - stool deirdre from 12/15/2023 improved from 389 to 130, but remains elevated  - CRP also improved from 29.9 on 7/6/23 to 10.9 on 12/14/23, remains elevated  - recent UST trough level of 1.8 with no Ab from 1/19 is subtherapeutic, which warrants for dose adjustment  - additionally,  recent scope from 3/22 showed patchy moderate inflammation in ascending colon and ICV, which was confirmed on biopsies.   - plan to optimize treatment to stelara 90mg SC every 4 weeks   - script sent to [facility]  - labs up to date: CBC CMP 12/2023, repeat 12/2024; TB and hep B neg 7/2023, repeat 7/2024  - case discussed with Dr. Marie

## 2024-03-27 NOTE — TELEPHONE ENCOUNTER
----- Message from James Marie MD sent at 3/27/2024  7:40 AM CDT -----  She recently had a colonoscopy with ongoing disease activity.  Recently had a Stelara level that was low.  I would like to see if we can get approval for every 4 week injections.  Thank you.

## 2024-04-01 NOTE — TELEPHONE ENCOUNTER
- Received fax from [facility] confirming Stelara 45 mg/0.5 mL vial (1.00/28) is approved  - Valid through 12/31/24  - Scanned into media tab

## 2024-04-29 ENCOUNTER — TELEPHONE (OUTPATIENT)
Dept: GASTROENTEROLOGY | Facility: CLINIC | Age: 54
End: 2024-04-29
Payer: MEDICARE

## 2024-04-29 NOTE — TELEPHONE ENCOUNTER
----- Message from Aileen Mina sent at 4/29/2024 10:58 AM CDT -----  Who Called: Pt    What is the request in detail: Requesting call back to discuss July appt and the date not working for pt. Pt requesting 07/26 or 29, and an earlier appt time as well,  Please advise    Can the clinic reply by [facility]? No    Best Call Back Number: [phone]      Additional Information:

## 2024-05-01 ENCOUNTER — TELEPHONE (OUTPATIENT)
Dept: GASTROENTEROLOGY | Facility: CLINIC | Age: 54
End: 2024-05-01
Payer: MEDICARE

## 2024-06-28 ENCOUNTER — PATIENT MESSAGE (OUTPATIENT)
Dept: GASTROENTEROLOGY | Facility: CLINIC | Age: 54
End: 2024-06-28
Payer: MEDICARE

## 2024-07-30 ENCOUNTER — PATIENT MESSAGE (OUTPATIENT)
Dept: GASTROENTEROLOGY | Facility: CLINIC | Age: 54
End: 2024-07-30
Payer: MEDICARE

## 2024-07-31 ENCOUNTER — OFFICE VISIT (OUTPATIENT)
Dept: GASTROENTEROLOGY | Facility: CLINIC | Age: 54
End: 2024-07-31
Payer: MEDICARE

## 2024-07-31 VITALS — WEIGHT: 185 LBS | HEIGHT: 63 IN | BODY MASS INDEX: 32.78 KG/M2

## 2024-07-31 DIAGNOSIS — K50.80 CROHN'S DISEASE OF BOTH SMALL AND LARGE INTESTINE WITHOUT COMPLICATION: Primary | ICD-10-CM

## 2024-07-31 DIAGNOSIS — Z79.899 IMMUNOSUPPRESSION DUE TO DRUG THERAPY: ICD-10-CM

## 2024-07-31 DIAGNOSIS — D84.821 IMMUNOSUPPRESSION DUE TO DRUG THERAPY: ICD-10-CM

## 2024-07-31 DIAGNOSIS — D50.0 IRON DEFICIENCY ANEMIA DUE TO CHRONIC BLOOD LOSS: ICD-10-CM

## 2024-07-31 PROCEDURE — 1159F MED LIST DOCD IN RCRD: CPT | Mod: HCWC,CPTII,95, | Performed by: INTERNAL MEDICINE

## 2024-07-31 PROCEDURE — 99214 OFFICE O/P EST MOD 30 MIN: CPT | Mod: HCWC,95,, | Performed by: INTERNAL MEDICINE

## 2024-07-31 PROCEDURE — 4010F ACE/ARB THERAPY RXD/TAKEN: CPT | Mod: HCWC,CPTII,95, | Performed by: INTERNAL MEDICINE

## 2024-07-31 PROCEDURE — G2211 COMPLEX E/M VISIT ADD ON: HCPCS | Mod: HCWC,95,, | Performed by: INTERNAL MEDICINE

## 2024-07-31 PROCEDURE — 3008F BODY MASS INDEX DOCD: CPT | Mod: HCWC,CPTII,95, | Performed by: INTERNAL MEDICINE

## 2024-07-31 RX ORDER — TIRZEPATIDE 2.5 MG/.5ML
INJECTION, SOLUTION SUBCUTANEOUS
COMMUNITY
Start: 2024-07-20

## 2024-07-31 RX ORDER — USTEKINUMAB 45 MG/.5ML
INJECTION, SOLUTION SUBCUTANEOUS
COMMUNITY
Start: 2024-07-10

## 2024-07-31 RX ORDER — LISINOPRIL 5 MG/1
TABLET ORAL
COMMUNITY
Start: 2024-06-10

## 2024-07-31 NOTE — PROGRESS NOTES
[facility]          Inflammatory Bowel Disease Follow Up Note         TODAY'S VISIT DATE:  7/31/2024    Reason for Consult:    Chief Complaint   Patient presents with    Crohn's Disease       PCP: Carmina Cordova)      Referring MD:   No ref. provider found    History of Present Illness:  Giovanna Medina who is a 54 y.o. female is being seen today at the [facility] on 07/31/2024 for inflammatory bowel disease- Crohn's disease.  She reports that she is doing pretty well.  She is having about 2 bowel movements a day.  She has no blood in the stools.  Your stools are for the most part formed.  She does not have any significant abdominal pain.  She did not recently have some discomfort in her I that resolved on its own.  She is planning to follow up with her ophthalmologist in the near future.  She is taking Stelara every 4 weeks.  This was started after her colonoscopy in March.  She denies any problems with the Stelara injections.    IBD History:  In July of 2022 she underwent a screening colonoscopy was found to have ulceration in the ascending colon, cecum, and in the terminal ileum.  Biopsy showed chronic inflammatory changes.  At that time it was noted that she was taking a significant amount of NSAIDs.  Inflammatory markers checked during that time revealed elevated CRP and calprotectin in the 300s.  There was concern for possible Crohn's disease but given the ongoing NSAID use there was suspicion that this might be the underlying issue.  She stopped NSAIDs and had a repeat colonoscopy in December of 2022.  At that time she again had ulceration in the ascending colon and cecum as well as in the terminal ileum.  In addition to that her colonoscopy pictures suggest that maybe there was developing stenosis at the ileocecal valve and in the ascending colon at the level of the ileocecal valve.  She started Stelara on August 7, 2023.  A  "follow-up colonoscopy on March 22, 2024 revealed some mild ascending colon inflammation but normal terminal ileum.  Based on this and low Stelara drug level we decided to increase her dose to every 4 weeks.  She started this around April.    IBD Details:  Dx Date:  July 2022  Disease type/distribution:  Crohn's disease/ileum, cecum, ascending colon involvement  Current Treatment:  Stelara 90 mg every 4 weeks Start Date:  August 7, 2023 Response:  Unknown  Optimized:  Yes  Adverse reactions:  None  Prior surgeries:  None  CRP Elevation: Y   calprotectin:  Elevated with active disease  Disease Complications:  Asymptomatic strictures  Extraintestinal manifestations:  Possible joint pains  Prior treatments:   Steroids:  None  5ASA:  None  IMM:  None  TNF Inh:  None   Anti-Integrin:  None   IL 12/23:  Stelara-current medication  KASEY Inh:  None    Previous Clinical Trials:  None    Last Colonoscopy:   March 22, 2024-mild ascending colon and IC valve inflammation, normal terminal ileum, deformity of the IC valve from prior inflammation.  December 2022-ulceration in the ascending colon, cecum, and terminal ileum; fibrotic changes in the ascending colon and at the ileocecal valve    Other Endoscopies:  None    Imaging:   MRE:  None   CT:  December 2022- Inflammatory changes ileum and right colon but no stricturing or obstructing issues   Other:  No other pertinent imaging    Pertinent Labs:  Lab Results   Component Value Date    SEDRATE 41 (H) 12/06/2022    CRP 10.9 (H) 12/14/2023     No results found for: "TTGIGA", "IGA"  Lab Results   Component Value Date    TSH 0.480 01/11/2022     Lab Results   Component Value Date    UAPESJMI19YT 20 (L) 07/06/2023    GVNYIHTA96 432 07/06/2023     Lab Results   Component Value Date    HEPBSAG Non-reactive 07/06/2023    HEPBCAB Non-reactive 07/06/2023    HEPCAB Non-reactive 07/06/2023     Lab Results   Component Value Date    EXP09ZYEQ Negative 01/11/2022     Lab Results   Component Value " "Date    NIL 0.56919 07/06/2023    MITOGENNIL 9.928 07/06/2023     Lab Results   Component Value Date    TPTMINTERP SEE BELOW 08/01/2022     No results found for: "STOOLCULTURE", "WXWKOSBHWE4H", "HDLCKMTEVV9N", "CDIFFICILEAN", "CDIFFTOX", "CDIFFICILEBY"  Lab Results   Component Value Date    CALPROTECTIN 130.1 (H) 12/15/2023       Therapeutic Drug Monitoring Labs:  No results found for: "PROMETH"  No results found for: "ANSADAINIT", "INFLIXIMAB", "INFLIXINTERP"    Vaccinations:  No results found for: "HEPBSAB"  Lab Results   Component Value Date    HEPAIGG Non-reactive 07/06/2023     Lab Results   Component Value Date    VARICELLAZOS 2333.00 07/06/2023    VARICELLAINT Positive 07/06/2023       There is no immunization history on file for this patient.      Review of Systems  Review of Systems   Constitutional:  Negative for chills, fever and weight loss.   HENT:  Negative for sore throat.    Eyes:  Positive for blurred vision. Negative for pain, discharge and redness.   Respiratory:  Negative for cough, shortness of breath and wheezing.    Cardiovascular:  Negative for chest pain, orthopnea and leg swelling.   Gastrointestinal:  Negative for abdominal pain, blood in stool, constipation, diarrhea, heartburn, melena, nausea and vomiting.   Genitourinary:  Negative for dysuria, frequency and urgency.   Musculoskeletal:  Negative for back pain, joint pain and myalgias.   Skin:  Negative for itching and rash.   Neurological:  Negative for focal weakness and seizures.   Endo/Heme/Allergies:  Does not bruise/bleed easily.   Psychiatric/Behavioral:  Negative for depression. The patient is not nervous/anxious.        Medical History:   Past Medical History:   Diagnosis Date    Arthritis     Benign paroxysmal vertigo, bilateral     Crohn's disease     Diabetes mellitus, type 2     Glaucoma suspect of both eyes     History of pericarditis 12/2021    Hypercholesterolemia     Optic atrophy, unspecified        Surgical " History:  Past Surgical History:   Procedure Laterality Date    BRAIN SURGERY  2005     SECTION, CLASSIC      COLONOSCOPY N/A 2022    Procedure: COLONOSCOPY Suprep;  Surgeon: Vishal Mariee MD;  Location: [facility] ENDO;  Service: Endoscopy;  Laterality: N/A;    COLONOSCOPY N/A 2022    Procedure: COLONOSCOPY;  Surgeon: Vishal Mariee MD;  Location: [facility] ENDO;  Service: Endoscopy;  Laterality: N/A;    COLONOSCOPY N/A 3/22/2024    Procedure: COLONOSCOPY;  Surgeon: James Marie MD;  Location: [facility] ENDO (4TH FLR);  Service: Endoscopy;  Laterality: N/A;  Ref By: ,instr sent via portal,Suprep.patient on Ozempic informed patient to hold for 7 days.AC  3/11/24-LVM regarding last dose Ozempic on or before 3/14/24, instr portal-DS  3/19-precall complete-MS    TUBAL LIGATION         Family History:   Family History   Problem Relation Name Age of Onset    Diabetes Mother      Cataracts Mother      Glaucoma Mother      Stroke Mother      Heart disease Mother          CHF and aorta aneurysm complications at death    No Known Problems Father          trauma led to death at mid 30s    Diabetes Brother          3 brothers , x1 leukemia, x1 leukemia but had cardiac arrest after medication given, was on hospice; x1 abdominal cancer but uncertain type    Breast cancer Maternal Grandmother      Blindness Neg Hx      Macular degeneration Neg Hx      Retinal detachment Neg Hx      Colon cancer Neg Hx      Ovarian cancer Neg Hx         Social History:   Social History     Tobacco Use    Smoking status: Never    Smokeless tobacco: Never   Substance Use Topics    Alcohol use: No    Drug use: No       Allergies: Reviewed    Home Medications:   Medication List with Changes/Refills   Current Medications    ASPIRIN 81 MG CHEW    Take 81 mg by mouth once daily.    ATROPINE 1% (ISOPTO ATROPINE) 1 % DROP    Place 1 drop into both eyes daily as needed (eye pain).    BD ALCOHOL SWABS PADM         "BETA-CAROTENE,A,-VITS C AND E (ANTIOXIDANT ORAL)    Take 1 tablet by mouth. Unsure on brand, white bottle    COMBIGAN 0.2-0.5 % DROP    Place 1 drop into both eyes 2 (two) times a day.    DICLOFENAC SODIUM (VOLTAREN) 1 % GEL    Apply 2 g topically 3 (three) times daily.    FERROUS SULFATE (FEOSOL) TAB TABLET    Take 1 tablet by mouth daily with breakfast. Elemental QD for 6 weeks if iron improved will stop infusions, not started yet    FLUTICASONE PROPIONATE (FLONASE) 50 MCG/ACTUATION NASAL SPRAY    by Each Nostril route as needed.    KETOROLAC 0.5% (ACULAR) 0.5 % DROP    Place 1 drop into the left eye 3 (three) times daily.    LACTOBACILLUS RHAMNOSUS GG (CULTURELLE) 10 BILLION CELL CAPSULE    Take 1 capsule by mouth once daily.    LISINOPRIL (PRINIVIL,ZESTRIL) 5 MG TABLET    Take by mouth.    LORATADINE (CLARITIN) 10 MG TABLET    Take 1 tablet (10 mg total) by mouth once daily.    MECLIZINE (ANTIVERT) 12.5 MG TABLET    Take 1 tablet by mouth daily as needed.    MOUNJARO 2.5 MG/0.5 ML PNIJ    Inject into the skin.    OZEMPIC 0.25 MG OR 0.5 MG (2 MG/3 ML) PEN INJECTOR    once a week.    PRAVASTATIN (PRAVACHOL) 40 MG TABLET        STELARA 45 MG/0.5 ML SOLN    Inject into the skin.    TRUE METRIX GLUCOSE TEST STRIP STRP        WHEAT DEXTRIN (BENEFIBER CLEAR SF, DEXTRIN,) 3 GRAM/3.5 GRAM PWPK    Take 1 Dose by mouth as needed. Per package       Physical Exam:  Vital Signs:  Ht 5' 3" (1.6 m)   Wt 83.9 kg (185 lb)   LMP  (LMP Unknown)   BMI 32.77 kg/m²   Body mass index is 32.77 kg/m².    Physical Exam  Nursing note reviewed.   Constitutional:       General: She is not in acute distress.     Appearance: Normal appearance. She is well-developed. She is not ill-appearing or toxic-appearing.   Skin:     Findings: No rash.   Neurological:      Mental Status: She is alert.   Psychiatric:         Mood and Affect: Mood normal.         Behavior: Behavior normal.         Thought Content: Thought content normal.         " Judgment: Judgment normal.         Labs: reviewed and pertinent noted above    Assessment/Plan:  Giovanna Medina is a 54 y.o. female with ileocolonic Crohn's disease. The following issues were addresssed:    1. Crohn's disease of both small and large intestine without complication    2. Iron deficiency anemia due to chronic blood loss    3. Immunosuppression due to drug therapy      1. Crohn's disease:  She continues to do well.  I recommend that she continue Stelara every 4 weeks.  We will update labs in the near future.    2. Iron deficiency anemia:  Significant improvement in hemoglobin.  Continue to monitor.      3. Immunosuppression: Recommend shingles vaccination.       # IBD specific health maintenance:  Colon cancer surveillance:  Up-to-date    Annual:  - Eye exam:  Recommend follow up with Ophthalmology  - Skin exam (if on IMM/TNF):  Discuss in the future  - reminded pt to use sunblock/hats/sunprotective clothing  - PAP (if immunosuppressed):  Discuss in the future     DEXA:  Not applicable    Vitamin D:  Low.  Discuss supplementation in the future    Vaccines:    Influenza:  Recommend annual vaccination-patient declines today   Pneumovax:  Discuss in the future   HAV:  Discuss in the future   HBV:  Discuss in the future   Tdap:  Review in the future   MMR:  Immune   VZV:  Immune   HZV:  Discuss in the future   HPV:  Not applicable   Meningococcus:  Not applicable    Follow up: Follow up in about 6 months (around 1/31/2025).    Visit today is associated with current or anticipated ongoing medical care related to this patient's single serious condition/complex condition (Crohn's disease).     Thank you again for sending Giovanna Medina to see Dr. Oneil Marie today at the [facility]. Please don't hesitate to contact Dr. Marie if there are any questions regarding this evaluation, or if you have any other patients with inflammatory bowel disease for whom you would like a  consultation. You can reach Dr. Marie at [phone] or by email at [email or web address]    James Marie MD  Department of Gastroenterology  Inflammatory Bowel Disease    The patient location is:  [place]  The chief complaint leading to consultation is:  Crohn's disease    Visit type: audiovisual    Face to Face time with patient:  15  Twenty minutes of total time spent on the encounter, which includes face to face time and non-face to face time preparing to see the patient (eg, review of tests), Obtaining and/or reviewing separately obtained history, Documenting clinical information in the electronic or other health record, Independently interpreting results (not separately reported) and communicating results to the patient/family/caregiver, or Care coordination (not separately reported).         Each patient to whom he or she provides medical services by telemedicine is:  (1) informed of the relationship between the physician and patient and the respective role of any other health care provider with respect to management of the patient; and (2) notified that he or she may decline to receive medical services by telemedicine and may withdraw from such care at any time.    Notes:

## 2024-07-31 NOTE — PATIENT INSTRUCTIONS
1. Continue Stelara  2. Get updated labs   3. Get your shingles vaccines   4. Follow-up in 6 months

## 2024-08-07 ENCOUNTER — TELEPHONE (OUTPATIENT)
Dept: GASTROENTEROLOGY | Facility: CLINIC | Age: 54
End: 2024-08-07
Payer: MEDICARE

## 2024-08-07 DIAGNOSIS — K50.80 CROHN'S DISEASE OF BOTH SMALL AND LARGE INTESTINE WITHOUT COMPLICATION: Primary | ICD-10-CM

## 2024-08-08 ENCOUNTER — PATIENT OUTREACH (OUTPATIENT)
Dept: ADMINISTRATIVE | Facility: HOSPITAL | Age: 54
End: 2024-08-08
Payer: MEDICARE

## 2024-08-19 ENCOUNTER — TELEPHONE (OUTPATIENT)
Dept: OPHTHALMOLOGY | Facility: CLINIC | Age: 54
End: 2024-08-19
Payer: MEDICARE

## 2024-08-19 NOTE — TELEPHONE ENCOUNTER
----- Message from Ellie Hilton sent at 8/19/2024  4:45 PM CDT -----  Type:  Sooner Apoointment Request    Caller is requesting a sooner appointment.  Caller declined first available appointment listed below.  Caller will not accept being placed on the waitlist and is requesting a message be sent to doctor.  Name of Caller:pt  When is the first available appointment?n/a  Symptoms:routine eye and contact lens   Would the patient rather a call back or a response via [facility]? Call or protal   Best Call Back Number:[phone]  Additional Information:

## 2024-09-05 ENCOUNTER — TELEPHONE (OUTPATIENT)
Dept: OPHTHALMOLOGY | Facility: CLINIC | Age: 54
End: 2024-09-05
Payer: MEDICARE

## 2024-09-05 NOTE — TELEPHONE ENCOUNTER
Spoke with patient regarding eye pain, redness, and light sensitivity since yesterday. Patient informed me she has been off combigan and atropine since 5/2024. I informed patient that we are unable to send drops to pharmacy since she has not been seen since 2022. I scheduled an urgent appt with Dr. Del Cid on Tuesday. I also advised patient that if pain persists, she can go to the ED where there are Ophthalmologists on call. Patient expressed understanding. I let her know I would also send a message to Dr. Farrell to see if she feels comfortable sending drops but reminded her she may not send them since she has never been seen by her.

## 2024-09-10 ENCOUNTER — OFFICE VISIT (OUTPATIENT)
Dept: OPHTHALMOLOGY | Facility: CLINIC | Age: 54
End: 2024-09-10
Payer: MEDICARE

## 2024-09-10 ENCOUNTER — PATIENT MESSAGE (OUTPATIENT)
Dept: OPHTHALMOLOGY | Facility: CLINIC | Age: 54
End: 2024-09-10

## 2024-09-10 DIAGNOSIS — H46.8 COMPRESSIVE OPTIC NEUROPATHY: Primary | ICD-10-CM

## 2024-09-10 DIAGNOSIS — H25.13 NUCLEAR SCLEROTIC CATARACT OF BOTH EYES: ICD-10-CM

## 2024-09-10 DIAGNOSIS — H40.1134 PRIMARY OPEN ANGLE GLAUCOMA (POAG) OF BOTH EYES, INDETERMINATE STAGE: Primary | ICD-10-CM

## 2024-09-10 DIAGNOSIS — H54.10 BLINDNESS AND LOW VISION: ICD-10-CM

## 2024-09-10 DIAGNOSIS — H40.023 OPEN ANGLE WITH BORDERLINE FINDINGS AND HIGH GLAUCOMA RISK IN BOTH EYES: ICD-10-CM

## 2024-09-10 PROBLEM — H47.099 COMPRESSIVE OPTIC NEUROPATHY: Status: ACTIVE | Noted: 2024-09-10

## 2024-09-10 PROCEDURE — 99999 PR PBB SHADOW E&M-EST. PATIENT-LVL III: CPT | Mod: PBBFAC,HCWC,, | Performed by: OPHTHALMOLOGY

## 2024-09-10 RX ORDER — TIMOLOL MALEATE 5 MG/ML
1 SOLUTION/ DROPS OPHTHALMIC 2 TIMES DAILY
Qty: 10 ML | Refills: 6 | Status: SHIPPED | OUTPATIENT
Start: 2024-09-10

## 2024-09-10 RX ORDER — TIMOLOL MALEATE 5 MG/ML
1 SOLUTION/ DROPS OPHTHALMIC 2 TIMES DAILY
COMMUNITY
End: 2024-09-10 | Stop reason: SDUPTHER

## 2024-09-10 NOTE — PROGRESS NOTES
HPI     Glaucoma     Additional comments: Glaucoma Eval           Comments    Pt states she had an episode last week of OD being photophobic, red, with   pain. This lasted for about 2 days but has since subsided. Her va seems to   be stable.     Primary open angle glaucoma (POAG) of both eyes, indeterminate stage  Uveitis of left eye  Visual field defec  POAG indeterminate stage OU    No gtts             Last edited by Francisco Javier Hester on 9/10/2024  2:23 PM.            Assessment /Plan     For exam results, see Encounter Report.    Compressive optic neuropathy  -     Color Fundus Photography - OU - Both Eyes  -     Posterior Segment OCT Optic Nerve- Both eyes; Future    Blindness and low vision    Open angle with borderline findings and high glaucoma risk in both eyes  -     Color Fundus Photography - OU - Both Eyes  -     Posterior Segment OCT Optic Nerve- Both eyes; Future    Nuclear sclerotic cataract of both eyes        LTFU 08/23/2022 --> 09/10/2024  Discussed silent Dz & fu    6 grand daughter  1 grand son  Daughter is       Hx of Pituitary Tumor resection  Compressive optic neuropathy OU  + Pallor    POAG suspect // OHT  Pre tx 17 // 25 09/10/2024    CCT  481 // 493    Both eyes --> discussed options & rational  Nic BID --> start and discussed SE, use and expectations with Q & A    Used Combigan in past    Hold PGA --> hx Iritis    ?? Hx Iritis Quiet  + Crohns Dz  ? HLA - B27      NSC OU  Observe  CE PRN    NIDDM  No BDR // CSME  Control    09/10/2024            Plan  RTC 3 months IOP & OCT RNFL & adherence  RTC sooner prn with good understanding

## 2024-09-16 ENCOUNTER — TELEPHONE (OUTPATIENT)
Dept: GASTROENTEROLOGY | Facility: CLINIC | Age: 54
End: 2024-09-16
Payer: MEDICARE

## 2024-09-16 NOTE — TELEPHONE ENCOUNTER
- Current therapy:  Stelara 90 mg every 4 weeks Start Date:  August 7, 2023   - Last office visit:7/31/24  - Labs: CBC/CMP due 2/2025, TB , Hep B due 8/2025  - Next office visit: 1/29/25  - Allergies reviewed.   - Therapy plan updated.         Signed RN orders faxed to infusion center. Successful fax transmittal e-mail received.     Infusion center: [facility]

## 2025-01-13 ENCOUNTER — PATIENT MESSAGE (OUTPATIENT)
Dept: GASTROENTEROLOGY | Facility: CLINIC | Age: 55
End: 2025-01-13
Payer: MEDICARE

## 2025-01-29 ENCOUNTER — PATIENT MESSAGE (OUTPATIENT)
Dept: GASTROENTEROLOGY | Facility: CLINIC | Age: 55
End: 2025-01-29

## 2025-01-29 ENCOUNTER — OFFICE VISIT (OUTPATIENT)
Dept: GASTROENTEROLOGY | Facility: CLINIC | Age: 55
End: 2025-01-29
Payer: MEDICARE

## 2025-01-29 VITALS — HEIGHT: 63 IN | WEIGHT: 180 LBS | BODY MASS INDEX: 31.89 KG/M2

## 2025-01-29 DIAGNOSIS — D84.821 IMMUNOSUPPRESSION DUE TO DRUG THERAPY: ICD-10-CM

## 2025-01-29 DIAGNOSIS — E66.811 OBESITY (BMI 30.0-34.9): ICD-10-CM

## 2025-01-29 DIAGNOSIS — E55.9 VITAMIN D DEFICIENCY: ICD-10-CM

## 2025-01-29 DIAGNOSIS — Z79.899 IMMUNOSUPPRESSION DUE TO DRUG THERAPY: ICD-10-CM

## 2025-01-29 DIAGNOSIS — K50.80 CROHN'S DISEASE OF BOTH SMALL AND LARGE INTESTINE WITHOUT COMPLICATION: ICD-10-CM

## 2025-01-29 DIAGNOSIS — D50.0 IRON DEFICIENCY ANEMIA DUE TO CHRONIC BLOOD LOSS: Primary | ICD-10-CM

## 2025-01-29 RX ORDER — ERGOCALCIFEROL 1.25 MG/1
50000 CAPSULE ORAL
COMMUNITY
Start: 2024-10-29

## 2025-01-29 NOTE — PATIENT INSTRUCTIONS
1. Get updated labs   2. Continue Stelara   3. Schedule colonoscopy in the near future-Crohn's disease-GoLYTELY due to constipation  4. Further plans based on colonoscopy results   5. Follow up in 6 months

## 2025-01-29 NOTE — PROGRESS NOTES
[facility]          Inflammatory Bowel Disease Follow Up Note         TODAY'S VISIT DATE:  1/29/2025    Reason for Consult:    Chief Complaint   Patient presents with    Crohn's Disease       PCP: Diana Sky      Referring MD:   No ref. provider found    History of Present Illness:  Giovanna Medina who is a 54 y.o. female is being seen today at the [facility] on 01/29/2025 for inflammatory bowel disease- Crohn's disease.  From a gastrointestinal standpoint she seems to be doing well.  She has not been having any diarrhea or blood in the stools.  She denies any significant abdominal pain issues.  She continues to take Stelara every 4 weeks without any issues.  She did have some constipation issues recently when she switch from Ozempic to Mounjaro.  She held her Mounjaro dose last week and her bowels are starting to move again.  She does report that she has been having more pain in her hips and her sacroiliitis has been acting up a little bit.  Her recent stool calprotectin level (December of 2024) was higher at 311.  She did not have any increase in GI symptoms.  She also had concerns over Stelara causing colon cancer.  She had looked us some information on [employer] and the artificial intelligence generated answer suggested that there was not increase in risk.    IBD History:  In July of 2022 she underwent a screening colonoscopy was found to have ulceration in the ascending colon, cecum, and in the terminal ileum.  Biopsy showed chronic inflammatory changes.  At that time it was noted that she was taking a significant amount of NSAIDs.  Inflammatory markers checked during that time revealed elevated CRP and calprotectin in the 300s.  There was concern for possible Crohn's disease but given the ongoing NSAID use there was suspicion that this might be the underlying issue.  She stopped NSAIDs and had a repeat colonoscopy in December of 2022.   At that time she again had ulceration in the ascending colon and cecum as well as in the terminal ileum.  In addition to that her colonoscopy pictures suggest that maybe there was developing stenosis at the ileocecal valve and in the ascending colon at the level of the ileocecal valve.  She started Stelara on August 7, 2023.  A follow-up colonoscopy on March 22, 2024 revealed some mild ascending colon inflammation but normal terminal ileum.  Based on this and low Stelara drug level we decided to increase her dose to every 4 weeks.  She started this around April.  In December of 2024 she had a follow-up calprotectin level that was slightly higher at 311.  She was still asymptomatic.    IBD Details:  Dx Date:  July 2022  Disease type/distribution:  Crohn's disease/ileum, cecum, ascending colon involvement  Current Treatment:  Stelara 90 mg every 4 weeks Start Date:  August 7, 2023 Response:  Pending  Optimized:  Yes  Adverse reactions:  None  Prior surgeries:  None  CRP Elevation: Y   calprotectin:  Elevated with active disease  Disease Complications:  Asymptomatic strictures  Extraintestinal manifestations:  Possible joint pains  Prior treatments:   Steroids:  None  5ASA:  None  IMM:  None  TNF Inh:  None   Anti-Integrin:  None   IL 12/23:  Stelara-current medication  KASEY Inh:  None    Previous Clinical Trials:  None    Last Colonoscopy:   March 22, 2024-mild ascending colon and IC valve inflammation, normal terminal ileum, deformity of the IC valve from prior inflammation.  December 2022-ulceration in the ascending colon, cecum, and terminal ileum; fibrotic changes in the ascending colon and at the ileocecal valve    Other Endoscopies:  None    Imaging:   MRE:  None   CT:  December 2022- Inflammatory changes ileum and right colon but no stricturing or obstructing issues   Other:  No other pertinent imaging    Pertinent Labs:  Lab Results   Component Value Date    SEDRATE 41 (H) 12/06/2022    CRP 28.7 (H) 08/05/2024  "    No results found for: "TTGIGA", "IGA"  Lab Results   Component Value Date    TSH 0.480 01/11/2022     Lab Results   Component Value Date    GSPLZGYC53HA 20 (L) 07/06/2023    RSQMRQNH15 432 07/06/2023     Lab Results   Component Value Date    HEPBSAG Non-reactive 08/05/2024    HEPBCAB Non-reactive 08/05/2024    HEPCAB Non-reactive 07/06/2023     Lab Results   Component Value Date    DGG69RIAH Negative 01/11/2022     Lab Results   Component Value Date    NIL 0.48306 08/05/2024    MITOGENNIL 9.914 08/05/2024     Lab Results   Component Value Date    TPTMINTERP SEE BELOW 08/01/2022     No results found for: "STOOLCULTURE", "FDXMVPPXUB7D", "NVOKPLSIYL0T", "CDIFFICILEAN", "CDIFFTOX", "CDIFFICILEBY"  Lab Results   Component Value Date    CALPROTECTIN 311.0 (H) 12/18/2024       Therapeutic Drug Monitoring Labs:  No results found for: "PROMETH"  No results found for: "ANSADAINIT", "INFLIXIMAB", "INFLIXINTERP"    Vaccinations:  No results found for: "HEPBSAB"  Lab Results   Component Value Date    HEPAIGG Non-reactive 07/06/2023     Lab Results   Component Value Date    VARICELLAZOS 2333.00 07/06/2023    VARICELLAINT Positive 07/06/2023       There is no immunization history on file for this patient.      Review of Systems  Review of Systems   Constitutional:  Negative for chills, fever and weight loss.   HENT:  Negative for sore throat.    Eyes:  Negative for pain, discharge and redness.   Respiratory:  Negative for cough, shortness of breath and wheezing.    Cardiovascular:  Negative for chest pain, orthopnea and leg swelling.   Gastrointestinal:  Negative for abdominal pain, blood in stool, constipation, diarrhea, heartburn, melena, nausea and vomiting.   Genitourinary:  Negative for dysuria, frequency and urgency.   Musculoskeletal:  Positive for back pain and joint pain. Negative for myalgias.   Skin:  Negative for itching and rash.   Neurological:  Negative for focal weakness and seizures.   Endo/Heme/Allergies:  " Does not bruise/bleed easily.   Psychiatric/Behavioral:  Negative for depression. The patient is not nervous/anxious.        Medical History:   Past Medical History:   Diagnosis Date    Arthritis     Benign paroxysmal vertigo, bilateral     Crohn's disease     Diabetes mellitus, type 2     Glaucoma suspect of both eyes     History of pericarditis 2021    Hypercholesterolemia     Optic atrophy, unspecified        Surgical History:  Past Surgical History:   Procedure Laterality Date    BRAIN SURGERY  2005     SECTION, CLASSIC      COLONOSCOPY N/A 2022    Procedure: COLONOSCOPY Suprep;  Surgeon: Vishal Mariee MD;  Location: [facility] ENDO;  Service: Endoscopy;  Laterality: N/A;    COLONOSCOPY N/A 2022    Procedure: COLONOSCOPY;  Surgeon: Vishal Mariee MD;  Location: [facility] ENDO;  Service: Endoscopy;  Laterality: N/A;    COLONOSCOPY N/A 3/22/2024    Procedure: COLONOSCOPY;  Surgeon: James Marie MD;  Location: [facility] ENDO ([address] [place] [address]);  Service: Endoscopy;  Laterality: N/A;  Ref By: ,instr sent via portal,Suprep.patient on Ozempic informed patient to hold for 7 days.AC  3/11/24-LVM regarding last dose Ozempic on or before 3/14/24, instr portal-DS  3/19-precall complete-MS    TUBAL LIGATION         Family History:   Family History   Problem Relation Name Age of Onset    Diabetes Mother      Cataracts Mother      Glaucoma Mother      Stroke Mother      Heart disease Mother          CHF and aorta aneurysm complications at death    No Known Problems Father          trauma led to death at mid 30s    Diabetes Brother          3 brothers , x1 leukemia, x1 leukemia but had cardiac arrest after medication given, was on hospice; x1 abdominal cancer but uncertain type    Breast cancer Maternal Grandmother      Blindness Neg Hx      Macular degeneration Neg Hx      Retinal detachment Neg Hx      Colon cancer Neg Hx      Ovarian cancer Neg Hx         Social History:   Social History      Tobacco Use    Smoking status: Never    Smokeless tobacco: Never   Substance Use Topics    Alcohol use: No    Drug use: No       Allergies: Reviewed    Home Medications:   Medication List with Changes/Refills   Current Medications    ASPIRIN 81 MG CHEW    Take 81 mg by mouth once daily.    ATROPINE 1% (ISOPTO ATROPINE) 1 % DROP    Place 1 drop into both eyes daily as needed (eye pain).    BD ALCOHOL SWABS PADM        BETA-CAROTENE,A,-VITS C AND E (ANTIOXIDANT ORAL)    Take 1 tablet by mouth. Unsure on brand, white bottle    COMBIGAN 0.2-0.5 % DROP    Place 1 drop into both eyes 2 (two) times a day.    DICLOFENAC SODIUM (VOLTAREN) 1 % GEL    Apply 2 g topically 3 (three) times daily.    ERGOCALCIFEROL (ERGOCALCIFEROL) 50,000 UNIT CAP    Take 50,000 Units by mouth every 7 days.    FERROUS SULFATE (FEOSOL) TAB TABLET    Take 1 tablet by mouth daily with breakfast. Elemental QD for 6 weeks if iron improved will stop infusions, not started yet    FLUTICASONE PROPIONATE (FLONASE) 50 MCG/ACTUATION NASAL SPRAY    by Each Nostril route as needed.    KETOROLAC 0.5% (ACULAR) 0.5 % DROP    Place 1 drop into the left eye 3 (three) times daily.    LACTOBACILLUS RHAMNOSUS GG (CULTURELLE) 10 BILLION CELL CAPSULE    Take 1 capsule by mouth once daily.    LISINOPRIL (PRINIVIL,ZESTRIL) 5 MG TABLET    Take by mouth.    LORATADINE (CLARITIN) 10 MG TABLET    Take 1 tablet (10 mg total) by mouth once daily.    MECLIZINE (ANTIVERT) 12.5 MG TABLET    Take 1 tablet by mouth daily as needed.    MOUNJARO 2.5 MG/0.5 ML PNIJ    Inject into the skin.    OZEMPIC 0.25 MG OR 0.5 MG (2 MG/3 ML) PEN INJECTOR    once a week.    PRAVASTATIN (PRAVACHOL) 40 MG TABLET        TIMOLOL MALEATE 0.5% (TIMOPTIC) 0.5 % DROP    Place 1 drop into both eyes 2 (two) times daily.    TRUE METRIX GLUCOSE TEST STRIP STRP        USTEKINUMAB (STELARA) 90 MG/ML SYRG SYRINGE    Inject 90 mg into the skin every 28 days.    WHEAT DEXTRIN (BENEFIBER CLEAR SF, DEXTRIN,) 3  "GRAM/3.5 GRAM PWPK    Take 1 Dose by mouth as needed. Per package       Physical Exam:  Vital Signs:  Ht 5' 3" (1.6 m)   Wt 81.6 kg (180 lb)   LMP  (LMP Unknown)   BMI 31.89 kg/m²   Body mass index is 31.89 kg/m².    Physical Exam  Nursing note reviewed.   Constitutional:       General: She is not in acute distress.     Appearance: Normal appearance. She is well-developed. She is not ill-appearing or toxic-appearing.   Skin:     Findings: No rash.   Neurological:      Mental Status: She is alert.   Psychiatric:         Mood and Affect: Mood normal.         Behavior: Behavior normal.         Thought Content: Thought content normal.         Judgment: Judgment normal.         Labs: reviewed and pertinent noted above    Assessment/Plan:  Giovanna Medina is a 54 y.o. female with ileocolonic Crohn's disease. The following issues were addresssed:    1. Iron deficiency anemia due to chronic blood loss    2. Crohn's disease of both small and large intestine without complication    3. Vitamin D deficiency    4. Immunosuppression due to drug therapy    5. Obesity (BMI 30.0-34.9)      1. Crohn's disease:  From a symptom standpoint she is doing well but her calprotectin was more elevated.  We discussed things that can increase the calprotectin level but she denies any symptoms of a recent infection or anything like that around the time of the stool tests admission.  I am concerned that this may be a sign that the Stelara isn't keeping her inflammation under good control.  We did discuss today that Stelara has not been associated with an increased risk of colon cancer and that the answer that was generated with [employer] is incorrect.  I recommend that we get updated labs and that we set her up for colonoscopy in the next few weeks to reassess her disease activity.  If there still ongoing disease activity I think we have a few options that we can consider.  TNF inhibitor such as infliximab or adalimumab would be a good option for " her.  We discussed risks of infection, lymphoma, skin cancer, other possible side effects as well as expected benefits.  This may be a reasonable option as it could help with some of her sacroiliitis issues if these are inflammatory in nature.  Alternatively we could consider Entyvio or Skyrizi.  Risks and benefits of each of these medications was also discussed in detail.  She is going to think it us and we will make a decision based on the findings at her colonoscopy.    2. Iron deficiency anemia:  Significant improvement in hemoglobin.  Continue to monitor.      3. Immunosuppression:  Recommend flu shot and shingles vaccination.       # IBD specific health maintenance:  Colon cancer surveillance:  Up-to-date    Annual:  - Eye exam:  Recommend follow up with Ophthalmology  - Skin exam (if on IMM/TNF):  Recommend annual skin exam  - reminded pt to use sunblock/hats/sunprotective clothing  - PAP (if immunosuppressed):  Recommend annual pelvic exam    DEXA:  Not applicable    Vitamin D:  Not currently taking supplementation.  Encouraged her to restart this.    Vaccines:    Influenza:  Recommend annual vaccination   Pneumovax:  Discuss in the future   HAV:  Discuss in the future   HBV:  Discuss in the future   Tdap:  Review in the future   MMR:  Immune   VZV:  Immune   HZV:  Recommend vaccination   HPV:  Not applicable   Meningococcus:  Not applicable    Follow up: Follow up in about 6 months (around 7/29/2025).    Visit today is associated with current or anticipated ongoing medical care related to this patient's single serious condition/complex condition (Crohn's disease).     Thank you again for sending Giovanna Medina to see Dr. Oneil Marie today at the [facility]. Please don't hesitate to contact Dr. Marie if there are any questions regarding this evaluation, or if you have any other patients with inflammatory bowel disease for whom you would like a consultation. You can reach  Dr. Marie at [phone] or by email at [email or web address]    James Marie MD  Department of Gastroenterology  Inflammatory Bowel Disease    The patient location is:  [place]  The chief complaint leading to consultation is:  Crohn's disease    Visit type: audiovisual    Face to Face time with patient:  15  Twenty-five minutes of total time spent on the encounter, which includes face to face time and non-face to face time preparing to see the patient (eg, review of tests), Obtaining and/or reviewing separately obtained history, Documenting clinical information in the electronic or other health record, Independently interpreting results (not separately reported) and communicating results to the patient/family/caregiver, or Care coordination (not separately reported).         Each patient to whom he or she provides medical services by telemedicine is:  (1) informed of the relationship between the physician and patient and the respective role of any other health care provider with respect to management of the patient; and (2) notified that he or she may decline to receive medical services by telemedicine and may withdraw from such care at any time.    Notes:

## 2025-02-10 ENCOUNTER — TELEPHONE (OUTPATIENT)
Dept: ENDOSCOPY | Facility: HOSPITAL | Age: 55
End: 2025-02-10
Payer: MEDICARE

## 2025-02-10 ENCOUNTER — TELEPHONE (OUTPATIENT)
Dept: GASTROENTEROLOGY | Facility: CLINIC | Age: 55
End: 2025-02-10
Payer: MEDICARE

## 2025-02-10 VITALS — WEIGHT: 180 LBS | BODY MASS INDEX: 31.89 KG/M2 | HEIGHT: 63 IN

## 2025-02-10 DIAGNOSIS — K50.919 CROHN'S DISEASE WITH COMPLICATION, UNSPECIFIED GASTROINTESTINAL TRACT LOCATION: Primary | ICD-10-CM

## 2025-02-10 DIAGNOSIS — Z12.11 COLON CANCER SCREENING: ICD-10-CM

## 2025-02-10 DIAGNOSIS — K59.00 CONSTIPATION, UNSPECIFIED CONSTIPATION TYPE: ICD-10-CM

## 2025-02-10 RX ORDER — SODIUM, POTASSIUM,MAG SULFATES 17.5-3.13G
1 SOLUTION, RECONSTITUTED, ORAL ORAL DAILY
Qty: 1 KIT | Refills: 0 | Status: SHIPPED | OUTPATIENT
Start: 2025-02-10 | End: 2025-02-12

## 2025-02-10 NOTE — TELEPHONE ENCOUNTER
"Spoke to Giovanna about her concerns about being on Stelara. She expressed she's not sure she wants to continue this medication if her "numbers keep going up." Discussed with her that continuing the Stelara at least until the colonoscopy would be most beneficial before changing treatments. She has a concern of cancer, and is concerned about the December calprotectin level being high.  "

## 2025-02-10 NOTE — TELEPHONE ENCOUNTER
"----- Message from Brenda sent at 2/10/2025  2:28 PM CST -----  Pt has questions about her Stelara and the colonoscopy. Please reach out to her about this.     Pt states she no longer has constipation since stopping Mounjaro and switching to Ozempic.    She requested Suprep instead of Golytely.     Scheduled for 4/15/25  ----- Message -----  From: Theresa Charles  Sent: 2025   8:35 AM CST  To: Brenda Scherer      ----- Message -----  From: Nazia Olmedo RN  Sent: 2025   3:11 PM CST  To: [facility] Patients; #    Procedure: Colonoscopy    Diagnosis: Constipation and Crohn's disease    Procedure Timin-12 weeks    #If within 4 weeks selected, please meryl as high priority#    #If greater than 12 weeks, please select "5-12 weeks" and delay sending until 3 months prior to requested date#     Location: Any Site    Additional Scheduling Information: No scheduling concerns    Prep Specifications: Arlen    Is the patient taking a GLP-1 Agonist:no    Have you attached a patient to this message: yes  "

## 2025-02-10 NOTE — TELEPHONE ENCOUNTER
"Referral for procedure from [facility]      Spoke to pt to schedule procedure(s) Colonoscopy       Physician to perform procedure(s) Dr. DENEEN Marie  Date of Procedure (s) 4/15/25  Arrival Time 9:20 AM  Time of Procedure(s) 10:20 AM   Location of Procedure(s) [facility] [address] [facility]  Type of Rx Prep sent to patient: Suprep  Instructions provided to patient via [facility] & email    Patient was informed on the following information and verbalized understanding. Screening questionnaire reviewed with patient and complete. If procedure requires anesthesia, a responsible adult needs to be present to accompany the patient home, patient cannot drive after receiving anesthesia. Appointment details are tentative, especially check-in time. Patient will receive a prep-op call 7 days prior to confirm check-in time for procedure. If applicable the patient should contact their pharmacy to verify Rx for procedure prep is ready for pick-up. Patient was advised to call the scheduling department at [phone] if pharmacy states no Rx is available. Patient was advised to call the endoscopy scheduling department if any questions or concerns arise.       Endoscopy Scheduling Department        ----- Message -----   From: Nazia Olmedo RN   Sent: 2025   3:11 PM CST   To: [facility] Patients; *     Procedure: Colonoscopy     Diagnosis: Constipation and Crohn's disease     Procedure Timin-12 weeks     *If within 4 weeks selected, please meryl as high priority*     *If greater than 12 weeks, please select "5-12 weeks" and delay sending until 3 months prior to requested date*     Location: Any Site     Additional Scheduling Information: No scheduling concerns     Prep Specifications: Arlen     Is the patient taking a GLP-1 Agonist:no     Have you attached a patient to this message: yes   "

## 2025-02-13 DIAGNOSIS — H40.1134 PRIMARY OPEN ANGLE GLAUCOMA (POAG) OF BOTH EYES, INDETERMINATE STAGE: ICD-10-CM

## 2025-02-13 RX ORDER — TIMOLOL MALEATE 5 MG/ML
1 SOLUTION/ DROPS OPHTHALMIC 2 TIMES DAILY
Qty: 10 ML | Refills: 6 | Status: SHIPPED | OUTPATIENT
Start: 2025-02-13

## 2025-02-13 NOTE — TELEPHONE ENCOUNTER
Refilled Timolol eye medication    ----- Message from Roseonly sent at 2/13/2025 10:42 AM CST -----  Regarding: rx refill  Contact: :[email or web address]  RX Name:timolol maleate 0.5% (TIMOPTIC) 0.5 % Drop     How is it taken:Sig - Route: Place 1 drop into both eyes 2 (two) times daily. - Both Eyes     Quantity:       Preferred Pharmacy with phone number:[email or web address]   [facility] - [facility] [address]  [address] [address]  Phone: [phone] Fax: [phone]             Ordering Provider:Nehemias       Contact Preference:[phone] (home)       Addl info:

## 2025-04-11 ENCOUNTER — PATIENT MESSAGE (OUTPATIENT)
Dept: ENDOSCOPY | Facility: HOSPITAL | Age: 55
End: 2025-04-11
Payer: MEDICARE

## 2025-04-15 ENCOUNTER — ANESTHESIA (OUTPATIENT)
Dept: ENDOSCOPY | Facility: HOSPITAL | Age: 55
End: 2025-04-15
Payer: MEDICARE

## 2025-04-15 ENCOUNTER — HOSPITAL ENCOUNTER (OUTPATIENT)
Facility: HOSPITAL | Age: 55
Discharge: HOME | End: 2025-04-15
Attending: INTERNAL MEDICINE | Admitting: INTERNAL MEDICINE
Payer: MEDICARE

## 2025-04-15 ENCOUNTER — ANESTHESIA EVENT (OUTPATIENT)
Dept: ENDOSCOPY | Facility: HOSPITAL | Age: 55
End: 2025-04-15
Payer: MEDICARE

## 2025-04-15 VITALS
WEIGHT: 185.19 LBS | SYSTOLIC BLOOD PRESSURE: 100 MMHG | BODY MASS INDEX: 32.81 KG/M2 | HEART RATE: 96 BPM | OXYGEN SATURATION: 98 % | RESPIRATION RATE: 14 BRPM | TEMPERATURE: 98 F | DIASTOLIC BLOOD PRESSURE: 63 MMHG | HEIGHT: 63 IN

## 2025-04-15 DIAGNOSIS — K50.90 CROHN'S DISEASE: ICD-10-CM

## 2025-04-15 DIAGNOSIS — K50.919 CROHN'S DISEASE WITH COMPLICATION, UNSPECIFIED GASTROINTESTINAL TRACT LOCATION: ICD-10-CM

## 2025-04-15 DIAGNOSIS — K59.00 CONSTIPATION, UNSPECIFIED CONSTIPATION TYPE: ICD-10-CM

## 2025-04-15 LAB — POCT GLUCOSE: 123 MG/DL (ref 70–110)

## 2025-04-15 PROCEDURE — 45380 COLONOSCOPY AND BIOPSY: CPT | Performed by: INTERNAL MEDICINE

## 2025-04-15 PROCEDURE — 88305 TISSUE EXAM BY PATHOLOGIST: CPT | Mod: TC | Performed by: INTERNAL MEDICINE

## 2025-04-15 PROCEDURE — 27201012 HC FORCEPS, HOT/COLD, DISP: Performed by: INTERNAL MEDICINE

## 2025-04-15 PROCEDURE — 45380 COLONOSCOPY AND BIOPSY: CPT | Mod: ,,, | Performed by: INTERNAL MEDICINE

## 2025-04-15 PROCEDURE — 63600175 PHARM REV CODE 636 W HCPCS: Mod: HCWC | Performed by: NURSE ANESTHETIST, CERTIFIED REGISTERED

## 2025-04-15 PROCEDURE — 25000003 PHARM REV CODE 250: Mod: HCWC | Performed by: NURSE ANESTHETIST, CERTIFIED REGISTERED

## 2025-04-15 PROCEDURE — 37000009 HC ANESTHESIA EA ADD 15 MINS: Performed by: INTERNAL MEDICINE

## 2025-04-15 PROCEDURE — 37000008 HC ANESTHESIA 1ST 15 MINUTES: Performed by: INTERNAL MEDICINE

## 2025-04-15 RX ORDER — SODIUM CHLORIDE 9 MG/ML
INJECTION, SOLUTION INTRAVENOUS CONTINUOUS PRN
Status: DISCONTINUED | OUTPATIENT
Start: 2025-04-15 | End: 2025-04-15

## 2025-04-15 RX ORDER — SODIUM CHLORIDE 9 MG/ML
INJECTION, SOLUTION INTRAVENOUS CONTINUOUS
Status: DISCONTINUED | OUTPATIENT
Start: 2025-04-15 | End: 2025-04-15 | Stop reason: HOSPADM

## 2025-04-15 RX ORDER — PROPOFOL 10 MG/ML
VIAL (ML) INTRAVENOUS CONTINUOUS PRN
Status: DISCONTINUED | OUTPATIENT
Start: 2025-04-15 | End: 2025-04-15

## 2025-04-15 RX ORDER — LIDOCAINE HYDROCHLORIDE 20 MG/ML
INJECTION INTRAVENOUS
Status: DISCONTINUED | OUTPATIENT
Start: 2025-04-15 | End: 2025-04-15

## 2025-04-15 RX ORDER — PROPOFOL 10 MG/ML
VIAL (ML) INTRAVENOUS
Status: DISCONTINUED | OUTPATIENT
Start: 2025-04-15 | End: 2025-04-15

## 2025-04-15 RX ADMIN — PROPOFOL 70 MG: 10 INJECTION, EMULSION INTRAVENOUS at 10:04

## 2025-04-15 RX ADMIN — LIDOCAINE HYDROCHLORIDE 80 MG: 20 INJECTION INTRAVENOUS at 10:04

## 2025-04-15 RX ADMIN — PROPOFOL 175 MCG/KG/MIN: 10 INJECTION, EMULSION INTRAVENOUS at 10:04

## 2025-04-15 RX ADMIN — SODIUM CHLORIDE: 9 INJECTION, SOLUTION INTRAVENOUS at 10:04

## 2025-04-15 NOTE — TRANSFER OF CARE
"Anesthesia Transfer of Care Note    Patient: Giovanna Medina    Procedure(s) Performed: Procedure(s) (LRB):  COLONOSCOPY (N/A)    Patient location: GI    Anesthesia Type: general    Transport from OR: Transported from OR on room air with adequate spontaneous ventilation    Post pain: adequate analgesia    Post assessment: no apparent anesthetic complications and tolerated procedure well    Post vital signs: stable    Level of consciousness: awake    Nausea/Vomiting: no nausea/vomiting    Complications: none    Transfer of care protocol was followed      Last vitals: Visit Vitals  BP (!) 169/84 (BP Location: Left arm, Patient Position: Lying)   Pulse 99   Temp 36.5 °C (97.7 °F) (Temporal)   Resp 16   Ht 5' 3" (1.6 m)   Wt 84 kg (185 lb 3 oz)   LMP  (LMP Unknown)   SpO2 98%   Breastfeeding No   BMI 32.80 kg/m²     "

## 2025-04-15 NOTE — ANESTHESIA PREPROCEDURE EVALUATION
[facility]  Anesthesia Pre-Operative Evaluation       Patient Name: Giovanna Medina  YOB: 1970  MRN: [id number]  [facility]: [id number]      Code Status: Prior   Date of Procedure: 4/15/2025  Procedure: Procedure(s) (LRB):  COLONOSCOPY (N/A)  Anesthesia: Choice  Pre-Operative Diagnosis: Final diagnoses:  [K50.90] Crohn's disease  Proceduralist/Surgeons and Role:     * James Marie MD - Primary    SUBJECTIVE:   Giovanna Medina is a 54 y.o. female w/ a significant PMHx listed below.    Patient now presents for the above procedure(s). Pt appropriately NPO.     LDA:       Anesthesia Evaluation      Airway   Mallampati: II  TM distance: Normal  Neck ROM: Normal ROM  Dental    (+) Intact    Pulmonary    Cardiovascular   Exercise tolerance: good    ECG reviewed  Rate: Normal    Neuro/Psych      GI/Hepatic/Renal    (+) bowel prep    Endo/Other    (+) diabetes mellitus type 2 well controlled  Abdominal                     ALLERGIES:     Review of patient's allergies indicates:   Allergen Reactions    Codeine Anaphylaxis    Nsaids (non-steroidal anti-inflammatory drug)     Aspirin      MEDICATIONS:     Current Outpatient Medications on File Prior to Encounter   Medication Sig Dispense Refill Last Dose/Taking    atropine 1% (ISOPTO ATROPINE) 1 % Drop Place 1 drop into both eyes daily as needed (eye pain). (Patient not taking: Reported on 1/29/2025) 5 mL 3     BD ALCOHOL SWABS PadM        beta-carotene,A,-vits C and E (ANTIOXIDANT ORAL) Take 1 tablet by mouth. Unsure on brand, white bottle (Patient not taking: Reported on 1/29/2025)       COMBIGAN 0.2-0.5 % Drop Place 1 drop into both eyes 2 (two) times a day. (Patient not taking: Reported on 1/29/2025) 45 mL 3     diclofenac sodium (VOLTAREN) 1 % Gel Apply 2 g topically 3 (three) times daily. (Patient not taking: Reported on 1/29/2025) 100 g 2     ergocalciferol (ERGOCALCIFEROL) 50,000 unit Cap Take 50,000 Units by mouth every 7 days.        ferrous sulfate (FEOSOL) Tab tablet Take 1 tablet by mouth daily with breakfast. Elemental QD for 6 weeks if iron improved will stop infusions, not started yet (Patient not taking: Reported on 2025)       fluticasone propionate (FLONASE) 50 mcg/actuation nasal spray by Each Nostril route as needed.       ketorolac 0.5% (ACULAR) 0.5 % Drop Place 1 drop into the left eye 3 (three) times daily. (Patient not taking: Reported on 2025) 10 mL 2     Lactobacillus rhamnosus GG (CULTURELLE) 10 billion cell capsule Take 1 capsule by mouth once daily. (Patient not taking: Reported on 2025)       lisinopriL (PRINIVIL,ZESTRIL) 5 MG tablet Take by mouth. (Patient not taking: Reported on 2025)       loratadine (CLARITIN) 10 mg tablet Take 1 tablet (10 mg total) by mouth once daily. 30 tablet 0     meclizine (ANTIVERT) 12.5 mg tablet Take 1 tablet by mouth daily as needed.       MOUNJARO 2.5 mg/0.5 mL PnIj Inject into the skin. (Patient not taking: Reported on 2025)       OZEMPIC 0.25 mg or 0.5 mg (2 mg/3 mL) pen injector once a week. (Patient not taking: Reported on 2025)   3/29/2025    pravastatin (PRAVACHOL) 40 MG tablet  (Patient not taking: Reported on 2025)       TRUE METRIX GLUCOSE TEST STRIP Strp        ustekinumab (STELARA) 90 mg/mL Syrg syringe Inject 90 mg into the skin every 28 days.       wheat dextrin (BENEFIBER CLEAR SF, DEXTRIN,) 3 gram/3.5 gram PwPk Take 1 Dose by mouth as needed. Per package        Current Medications[1]       History:   There are no hospital problems to display for this patient.    Problem List[2]   Past Medical History:   Diagnosis Date    Arthritis     Benign paroxysmal vertigo, bilateral     Crohn's disease     Diabetes mellitus, type 2     Glaucoma suspect of both eyes     History of pericarditis 2021    Hypercholesterolemia     Optic atrophy, unspecified      Past Surgical History:   Procedure Laterality Date    BRAIN SURGERY  2005      "SECTION, CLASSIC      COLONOSCOPY N/A 7/13/2022    Procedure: COLONOSCOPY Suprep;  Surgeon: Vishal Mariee MD;  Location: [facility] ENDO;  Service: Endoscopy;  Laterality: N/A;    COLONOSCOPY N/A 12/7/2022    Procedure: COLONOSCOPY;  Surgeon: Vishal Mariee MD;  Location: [facility] ENDO;  Service: Endoscopy;  Laterality: N/A;    COLONOSCOPY N/A 3/22/2024    Procedure: COLONOSCOPY;  Surgeon: James Marie MD;  Location: [facility] ENDO ([facility] FLR);  Service: Endoscopy;  Laterality: N/A;  Ref By: ,instr sent via portal,Suprep.patient on Ozempic informed patient to hold for 7 days.AC  3/11/24-LVM regarding last dose Ozempic on or before 3/14/24, instr portal-DS  3/19-precall complete-MS    TUBAL LIGATION       Alcohol use:    Social History     Substance and Sexual Activity   Alcohol Use No          OBJECTIVE:   Last 3 sets of Vitals        1/29/2025     2:24 PM 2/10/2025     1:49 PM 4/15/2025     9:39 AM   Vitals - 1 value per visit   SYSTOLIC   169   DIASTOLIC   84   Pulse   99   Temp   36.5 °C (97.7 °F)   Resp   16   SPO2   98 %   Weight (lb) 180 180 185.19   Weight (kg) 81.647 81.647 84   Height 5' 3" (1.6 m) 5' 3" (1.6 m) 5' 3" (1.6 m)   BMI (Calculated) 31.9 31.9 32.8   Pain Score Zero         Vital Signs (Most Recent):  Temp: 36.5 °C (97.7 °F) (04/15/25 0939)  Pulse: 99 (04/15/25 0939)  Resp: 16 (04/15/25 0939)  BP: (!) 169/84 (04/15/25 0939)  SpO2: 98 % (04/15/25 0939) Vital Signs Range (Last 24H):  Temp:  [36.5 °C (97.7 °F)]   Pulse:  [99]   Resp:  [16]   BP: (169)/(84)   SpO2:  [98 %]      No intake or output data in the 24 hours ending 04/15/25 0944    Body mass index is 32.8 kg/m².     Significant Labs:  Lab Results   Component Value Date    WBC 6.28 02/03/2025    HGB 12.9 02/03/2025    HCT 40.4 02/03/2025     02/03/2025    ALT 24 02/03/2025    AST 20 02/03/2025     02/03/2025    K 3.8 02/03/2025     02/03/2025    CREATININE 0.7 02/03/2025    BUN 9 02/03/2025    CO2 24 02/03/2025    " "TSH 0.480 01/11/2022     Lab Results   Component Value Date    PREGTESTUR Negative 05/18/2009      No LMP recorded (lmp unknown). Patient is postmenopausal.    EKG:   Results for orders placed or performed in visit on 06/11/23   IN OFFICE EKG 12-LEAD (to [place])    Collection Time: 06/11/23  5:10 PM    Narrative    Test Reason : R00.0,    Vent. Rate : 135 BPM     Atrial Rate : 135 BPM     P-R Int : 144 ms          QRS Dur : 084 ms      QT Int : 282 ms       P-R-T Axes : 056 -39 077 degrees     QTc Int : 423 ms    Sinus tachycardia  Left axis deviation  Anterolateral infarct (cited on or before 02-DEC-2021)  Abnormal ECG  When compared with ECG of 02-DEC-2021 15:47,  The axis Shifted left  T wave inversion now evident in Lateral leads  Confirmed by Meliton Montemayor MD ([id number]) on 6/13/2023 8:01:01 AM    Referred By: [email or web address]   SELF           Confirmed By:Meliton Montemayor MD       TTE:  No results found for this or any previous visit.  No results found for: "EF"  No results found for this or any previous visit.  KIRSTEN:  No results found for this or any previous visit.  Stress Test:  No results found for this or any previous visit.     LHC:  No results found for this or any previous visit.     PFT:  No results found for: "FEV1", "FVC", "YDF6FNO", "TLC", "DLCO"     ASSESSMENT/PLAN:                                                                                                                04/15/2025  Giovanna Medina is a 54 y.o., female.      Pre-op Assessment    I have reviewed the Patient Summary Reports.     I have reviewed the Nursing Notes. I have reviewed the NPO Status.   I have reviewed the Medications.     Review of Systems  Anesthesia Hx:  No problems with previous Anesthesia                Social:  Non-Smoker, Social Alcohol Use       Hematology/Oncology:  Hematology Normal   Oncology Normal                                   EENT/Dental:  EENT/Dental Normal           Cardiovascular:  Cardiovascular Normal " Exercise tolerance: good                 ECG has been reviewed.                            Pulmonary:  Pulmonary Normal                       Renal/:  Renal/ Normal                 Hepatic/GI:  Hepatic/GI Normal Bowel Prep.                   Musculoskeletal:  Musculoskeletal Normal                Neurological:  Neurology Normal                                      Endocrine:  Diabetes, well controlled, type 2           Dermatological:  Skin Normal    Psych:  Psychiatric Normal                    Physical Exam  General: Well nourished, Cooperative, Alert and Oriented    Airway:  Mallampati: II   Mouth Opening: Normal  TM Distance: Normal  Tongue: Normal  Neck ROM: Normal ROM    Dental:  Intact    Chest/Lungs:  Clear to auscultation, Normal Respiratory Rate    Heart:  Rate: Normal  Rhythm: Regular Rhythm  Sounds: Normal        Anesthesia Plan  Type of Anesthesia, risks & benefits discussed:    Anesthesia Type: Gen Natural Airway  Intra-op Monitoring Plan: Standard ASA Monitors  Induction:  IV  Informed Consent: Informed consent signed with the Patient and all parties understand the risks and agree with anesthesia plan.  All questions answered.   ASA Score: 2  Day of Surgery Review of History & Physical: H&P Update referred to the surgeon/provider.    Ready For Surgery From Anesthesia Perspective.     .           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

## 2025-04-15 NOTE — PROVATION PATIENT INSTRUCTIONS
Discharge Summary/Instructions after an Endoscopic Procedure  Patient Name: Giovanna Medina  Patient MRN: [id number]  Patient YOB: 1970  Tuesday, April 15, 2025  James Marie MD  Dear patient,  As a result of recent federal legislation (The Federal Cures Act), you may   receive lab or pathology results from your procedure in your [facility]   account before your physician is able to contact you. Your physician or   their representative will relay the results to you with their   recommendations at their soonest availability.  Thank you,  RESTRICTIONS:  During your procedure today, you received medications for sedation.  These   medications may affect your judgment, balance and coordination.  Therefore,   for 24 hours, you have the following restrictions:   - DO NOT drive a car, operate machinery, make legal/financial decisions,   sign important papers or drink alcohol.    ACTIVITY:  Today: no heavy lifting, straining or running due to procedural   sedation/anesthesia.  The following day: return to full activity including work.  DIET:  Eat and drink normally unless instructed otherwise.     TREATMENT FOR COMMON SIDE EFFECTS:  - Mild abdominal pain, nausea, belching, bloating or excessive gas:  rest,   eat lightly and use a heating pad.  - Sore Throat: treat with throat lozenges and/or gargle with warm salt   water.  - Because air was used during the procedure, expelling large amounts of air   from your rectum or belching is normal.  - If a bowel prep was taken, you may not have a bowel movement for 1-3 days.    This is normal.  SYMPTOMS TO WATCH FOR AND REPORT TO YOUR PHYSICIAN:  1. Abdominal pain or bloating, other than gas cramps.  2. Chest pain.  3. Back pain.  4. Signs of infection such as: chills or fever occurring within 24 hours   after the procedure.  5. Rectal bleeding, which would show as bright red, maroon, or black stools.   (A tablespoon of blood from the rectum is not serious, especially  if   hemorrhoids are present.)  6. Vomiting.  7. Weakness or dizziness.  GO DIRECTLY TO THE NEAREST EMERGENCY ROOM IF YOU HAVE ANY OF THE FOLLOWING:      Difficulty breathing              Chills and/or fever over 101 F   Persistent vomiting and/or vomiting blood   Severe abdominal pain   Severe chest pain   Black, tarry stools   Bleeding- more than one tablespoon   Any other symptom or condition that you feel may need urgent attention  Your doctor recommends these additional instructions:  If any biopsies were taken, your doctors clinic will contact you in 1 to 2   weeks with any results.  - Discharge patient to home.   - Resume previous diet today.   - Continue present medications.   - Await pathology results.   - Repeat colonoscopy in 1 year to assess disease activity.   - Return to my office as previously scheduled.   - Will discuss alternative treatment options after biopsies are back.  - Patient has a contact number available for emergencies.  The signs and   symptoms of potential delayed complications were discussed with the   patient.  Return to normal activities tomorrow.  Written discharge   instructions were provided to the patient.  For questions, problems or results please call your physician - James Marie MD at Work:  [phone].  [facility], EMERGENCY ROOM PHONE NUMBER: [phone]  IF A COMPLICATION OR EMERGENCY SITUATION ARISES AND YOU ARE UNABLE TO REACH   YOUR PHYSICIAN - GO DIRECTLY TO THE EMERGENCY ROOM.  James Marie MD  4/15/2025 10:48:07 AM  This report has been verified and signed electronically.  Dear patient,  As a result of recent federal legislation (The Federal Cures Act), you may   receive lab or pathology results from your procedure in your [facility]   account before your physician is able to contact you. Your physician or   their representative will relay the results to you with their   recommendations at their soonest availability.  Thank  you,  PROVATION

## 2025-04-15 NOTE — ANESTHESIA POSTPROCEDURE EVALUATION
Anesthesia Post Evaluation    Patient: Giovanna Medina    Procedure(s) Performed: Procedure(s) (LRB):  COLONOSCOPY (N/A)    Final Anesthesia Type: general      Patient location during evaluation: PACU  Patient participation: Yes- Able to Participate  Level of consciousness: awake and alert and oriented  Post-procedure vital signs: reviewed and stable  Pain management: adequate  Airway patency: patent    PONV status at discharge: No PONV  Anesthetic complications: no      Cardiovascular status: hemodynamically stable  Respiratory status: nasal cannula  Hydration status: euvolemic  Follow-up not needed.          Vitals Value Taken Time   /63 04/15/25 10:57   Temp 36.7 °C (98.1 °F) 04/15/25 10:42   Pulse 96 04/15/25 10:57   Resp 14 04/15/25 10:57   SpO2 98 % 04/15/25 10:57         Event Time   Out of Recovery 11:29:42         Pain/Lee Ann Score: Lee Ann Score: 8 (4/15/2025 10:42 AM)

## 2025-04-15 NOTE — H&P
Short Stay Endoscopy History and Physical    PCP - Diana Sky MD    Procedure - Colonoscopy  ASA - 2  Mallampati - per anesthesia  History of Anesthesia problems - no  Family history Anesthesia problems -  no     HPI:  This is a 54 y.o. female here for evaluation of :     Average Risk Screening: No  High risk screening: No  History of polyps: No  Anemia: No  Blood in stools: No  Diarrhea: No  Abdominal Pain: No  Other: Crohn's disease    Review of Systems:  CONSTITUTIONAL: Denies weight change,  fatigue, fevers, chills, night sweats.  CARDIOVASCULAR: Denies chest pain, shortness of breath, orthopnea and edema.  RESPIRATORY: Denies cough, hemoptysis, dyspnea, and wheezing.  GI: See HPI.    Medical History:  Past Medical History:   Diagnosis Date    Arthritis     Benign paroxysmal vertigo, bilateral     Crohn's disease     Diabetes mellitus, type 2     Glaucoma suspect of both eyes     History of pericarditis 2021    Hypercholesterolemia     Optic atrophy, unspecified        Surgical History:   Past Surgical History:   Procedure Laterality Date    BRAIN SURGERY  2005     SECTION, CLASSIC      COLONOSCOPY N/A 2022    Procedure: COLONOSCOPY Suprep;  Surgeon: Vishal Mariee MD;  Location: [facility];  Service: Endoscopy;  Laterality: N/A;    COLONOSCOPY N/A 2022    Procedure: COLONOSCOPY;  Surgeon: Vishal Mariee MD;  Location: [facility];  Service: Endoscopy;  Laterality: N/A;    COLONOSCOPY N/A 3/22/2024    Procedure: COLONOSCOPY;  Surgeon: James Marie MD;  Location: [address]);  Service: Endoscopy;  Laterality: N/A;  Ref By: ,instr sent via portal,Suprep.patient on Ozempic informed patient to hold for 7 days.AC  3/11/24-LVM regarding last dose Ozempic on or before 3/14/24, instr portal-DS  3/19-precall complete-MS    TUBAL LIGATION         Family History:   Family History   Problem Relation Name Age of Onset    Diabetes Mother      Cataracts Mother       Glaucoma Mother      Stroke Mother      Heart disease Mother          CHF and aorta aneurysm complications at death    No Known Problems Father          trauma led to death at mid 30s    Diabetes Brother          3 brothers , x1 leukemia, x1 leukemia but had cardiac arrest after medication given, was on hospice; x1 abdominal cancer but uncertain type    Breast cancer Maternal Grandmother      Blindness Neg Hx      Macular degeneration Neg Hx      Retinal detachment Neg Hx      Colon cancer Neg Hx      Ovarian cancer Neg Hx         Social History:   Social History[1]    Allergies: Reviewed.    Medications:  Medications Ordered Prior to Encounter[2]    Physical Exam:  Vital Signs:   Vitals:    04/15/25 0939   BP: (!) 169/84   Pulse: 99   Resp: 16   Temp: 97.7 °F (36.5 °C)     General Appearance: Well appearing in no acute distress  ENT: OP clear  Chest: CTA B  CV: RRR, no m/r/g  Abd: s/nt/nd/nabs  Ext: no edema    Labs:  Reviewed    IMPRESSION:  Crohn's disease    Plan:  I have explained the risks and benefits of colonoscopy to the patient including but not limited to bleeding, perforation, infection, and death. The patient wishes to proceed with colonoscopy.         [1]   Social History  Tobacco Use    Smoking status: Never    Smokeless tobacco: Never   Substance Use Topics    Alcohol use: No    Drug use: No   [2]   No current facility-administered medications on file prior to encounter.     Current Outpatient Medications on File Prior to Encounter   Medication Sig Dispense Refill    atropine 1% (ISOPTO ATROPINE) 1 % Drop Place 1 drop into both eyes daily as needed (eye pain). (Patient not taking: Reported on 2025) 5 mL 3    BD ALCOHOL SWABS PadM       beta-carotene,A,-vits C and E (ANTIOXIDANT ORAL) Take 1 tablet by mouth. Unsure on brand, white bottle (Patient not taking: Reported on 2025)      COMBIGAN 0.2-0.5 % Drop Place 1 drop into both eyes 2 (two) times a day. (Patient not taking:  Reported on 1/29/2025) 45 mL 3    diclofenac sodium (VOLTAREN) 1 % Gel Apply 2 g topically 3 (three) times daily. (Patient not taking: Reported on 1/29/2025) 100 g 2    ergocalciferol (ERGOCALCIFEROL) 50,000 unit Cap Take 50,000 Units by mouth every 7 days.      ferrous sulfate (FEOSOL) Tab tablet Take 1 tablet by mouth daily with breakfast. Elemental QD for 6 weeks if iron improved will stop infusions, not started yet (Patient not taking: Reported on 1/29/2025)      fluticasone propionate (FLONASE) 50 mcg/actuation nasal spray by Each Nostril route as needed.      ketorolac 0.5% (ACULAR) 0.5 % Drop Place 1 drop into the left eye 3 (three) times daily. (Patient not taking: Reported on 1/29/2025) 10 mL 2    Lactobacillus rhamnosus GG (CULTURELLE) 10 billion cell capsule Take 1 capsule by mouth once daily. (Patient not taking: Reported on 1/29/2025)      lisinopriL (PRINIVIL,ZESTRIL) 5 MG tablet Take by mouth. (Patient not taking: Reported on 1/29/2025)      loratadine (CLARITIN) 10 mg tablet Take 1 tablet (10 mg total) by mouth once daily. 30 tablet 0    meclizine (ANTIVERT) 12.5 mg tablet Take 1 tablet by mouth daily as needed.      MOUNJARO 2.5 mg/0.5 mL PnIj Inject into the skin. (Patient not taking: Reported on 1/29/2025)      OZEMPIC 0.25 mg or 0.5 mg (2 mg/3 mL) pen injector once a week. (Patient not taking: Reported on 1/29/2025)      pravastatin (PRAVACHOL) 40 MG tablet  (Patient not taking: Reported on 1/29/2025)      TRUE METRIX GLUCOSE TEST STRIP Strp       ustekinumab (STELARA) 90 mg/mL Syrg syringe Inject 90 mg into the skin every 28 days.      wheat dextrin (BENEFIBER CLEAR SF, DEXTRIN,) 3 gram/3.5 gram PwPk Take 1 Dose by mouth as needed. Per package

## 2025-04-17 LAB
ESTROGEN SERPL-MCNC: NORMAL PG/ML
INSULIN SERPL-ACNC: NORMAL U[IU]/ML
LAB AP CLINICAL INFORMATION: NORMAL
LAB AP GROSS DESCRIPTION: NORMAL
LAB AP PERFORMING LOCATION(S): NORMAL
LAB AP REPORT FOOTNOTES: NORMAL

## 2025-04-19 ENCOUNTER — RESULTS FOLLOW-UP (OUTPATIENT)
Dept: GASTROENTEROLOGY | Facility: CLINIC | Age: 55
End: 2025-04-19

## 2025-04-19 DIAGNOSIS — K50.80 CROHN'S DISEASE OF BOTH SMALL AND LARGE INTESTINE WITHOUT COMPLICATION: Primary | ICD-10-CM

## 2025-07-23 PROCEDURE — 83993 ASSAY FOR CALPROTECTIN FECAL: CPT | Mod: HCWC | Performed by: INTERNAL MEDICINE

## 2025-07-24 DIAGNOSIS — H40.1134 PRIMARY OPEN ANGLE GLAUCOMA (POAG) OF BOTH EYES, INDETERMINATE STAGE: ICD-10-CM

## 2025-07-24 RX ORDER — TIMOLOL MALEATE 5 MG/ML
1 SOLUTION/ DROPS OPHTHALMIC 2 TIMES DAILY
Qty: 25 ML | Refills: 11 | Status: SHIPPED | OUTPATIENT
Start: 2025-07-24

## 2025-07-25 ENCOUNTER — TELEPHONE (OUTPATIENT)
Dept: GASTROENTEROLOGY | Facility: CLINIC | Age: 55
End: 2025-07-25
Payer: MEDICARE

## 2025-07-25 NOTE — TELEPHONE ENCOUNTER
Copied from CRM #[id number]. Topic: General Inquiry - Return Call  >> Jul 25, 2025  1:22 PM Cristiane wrote:  Type:  Patient Returning Call    Who Called:Pt   Who Left Message for Patient:  Does the patient know what this is regarding?:no  Would the patient rather a call back or a response via [facility]? Call back   Best Call Back Number:[phone]  Additional Information: returning a missed call ... No message was left

## 2025-07-31 ENCOUNTER — TELEPHONE (OUTPATIENT)
Dept: OPHTHALMOLOGY | Facility: CLINIC | Age: 55
End: 2025-07-31
Payer: MEDICARE

## 2025-07-31 ENCOUNTER — TELEPHONE (OUTPATIENT)
Dept: GASTROENTEROLOGY | Facility: CLINIC | Age: 55
End: 2025-07-31
Payer: MEDICARE

## 2025-07-31 NOTE — TELEPHONE ENCOUNTER
Tried calling pt back but no answer was able to leave a voice message.     Copied from CRM #[id number]. Topic: General Inquiry - Patient Advice  >> Jul 31, 2025  3:29 PM Shonda wrote:  Pt calling to schedule nichelle and would like a referral for a   Low Vision specialist     Confirmed patient's contact info below:  Contact Name: Giovanna Medina  Phone Number: [phone]

## 2025-07-31 NOTE — TELEPHONE ENCOUNTER
Copied from CRM #[id number]. Topic: Appointments - Appointment Access  >> Jul 31, 2025 11:24 AM Ellie wrote:  Pt would like to have virtual rescheduled contact [phone] or portal

## 2025-07-31 NOTE — TELEPHONE ENCOUNTER
Tried calling pt back but no answer was able to leave a voice message.     Copied from CRM #[id number]. Topic: Appointments - Appointment Access  >> Jul 31, 2025 11:35 AM Ellie wrote:  Type:  Sooner Apoointment Request    Caller is requesting a sooner appointment.  Caller declined first available appointment listed below.  Caller will not accept being placed on the waitlist and is requesting a message be sent to doctor.  Name of Caller:pt  When is the first available appointment?n/a  Symptoms:routine eye exam  Would the patient rather a call back or a response via [facility]? Call or portal   Best Call Back Number:[phone]  Additional Information:

## 2025-08-01 ENCOUNTER — TELEPHONE (OUTPATIENT)
Dept: GASTROENTEROLOGY | Facility: CLINIC | Age: 55
End: 2025-08-01
Payer: MEDICARE

## 2025-08-01 ENCOUNTER — OFFICE VISIT (OUTPATIENT)
Dept: GASTROENTEROLOGY | Facility: CLINIC | Age: 55
End: 2025-08-01
Payer: MEDICARE

## 2025-08-01 VITALS — BODY MASS INDEX: 32.78 KG/M2 | HEIGHT: 63 IN | WEIGHT: 185 LBS

## 2025-08-01 DIAGNOSIS — E55.9 VITAMIN D DEFICIENCY: ICD-10-CM

## 2025-08-01 DIAGNOSIS — D50.0 IRON DEFICIENCY ANEMIA DUE TO CHRONIC BLOOD LOSS: ICD-10-CM

## 2025-08-01 DIAGNOSIS — K50.80 CROHN'S DISEASE OF BOTH SMALL AND LARGE INTESTINE WITHOUT COMPLICATION: Primary | ICD-10-CM

## 2025-08-01 DIAGNOSIS — Z79.899 IMMUNOSUPPRESSION DUE TO DRUG THERAPY: ICD-10-CM

## 2025-08-01 DIAGNOSIS — D84.821 IMMUNOSUPPRESSION DUE TO DRUG THERAPY: ICD-10-CM

## 2025-08-01 NOTE — PATIENT INSTRUCTIONS
Continue Evinature and diet changes  Submit stool test in 3 months  If stool test still elevated in 3 months will move forward with Entyvio  Follow up in 6 months

## 2025-08-01 NOTE — PROGRESS NOTES
[facility]          Inflammatory Bowel Disease Follow Up Note         TODAY'S VISIT DATE:  8/1/2025    Reason for Consult:    Chief Complaint   Patient presents with    Crohn's Disease       PCP: Diana Sky      Referring MD:   No ref. provider found    History of Present Illness:  Giovanna Medina who is a 55 y.o. female is being seen today at the [facility] on 08/01/2025 for inflammatory bowel disease- Crohn's disease.  Following our last visit she continued to have an elevated stool calprotectin level.  She underwent a colonoscopy April 15, 2025.  She was found to have active inflammation in the right colon and in the ileum as well as some stricturing in the right colon.  This was while taking Stelara every 4 weeks.  She continued to be asymptomatic.  We had discussed some alternative treatment options and she was primarily interested in considering natural therapies.  Currently she is taking Evinature but only recently started this.  She has switch her diet and is eating a Mediterranean diet.  She is also using probiotics and pre biotics.  She is open to considering other medical treatment options but currently would like to try her natural therapies 1st.  Her last dose of Stelara was in April.  From a symptom standpoint she denies any issues.  She reports having 1-2 soft bowel movements daily with no blood in his stools and no abdominal pain.    IBD History:  In July of 2022 she underwent a screening colonoscopy was found to have ulceration in the ascending colon, cecum, and in the terminal ileum.  Biopsy showed chronic inflammatory changes.  At that time it was noted that she was taking a significant amount of NSAIDs.  Inflammatory markers checked during that time revealed elevated CRP and calprotectin in the 300s.  There was concern for possible Crohn's disease but given the ongoing NSAID use there was suspicion that this might be the  underlying issue.  She stopped NSAIDs and had a repeat colonoscopy in December of 2022.  At that time she again had ulceration in the ascending colon and cecum as well as in the terminal ileum.  In addition to that her colonoscopy pictures suggest that maybe there was developing stenosis at the ileocecal valve and in the ascending colon at the level of the ileocecal valve.  She started Stelara on August 7, 2023.  A follow-up colonoscopy on March 22, 2024 revealed some mild ascending colon inflammation but normal terminal ileum.  Based on this and low Stelara drug level we decided to increase her dose to every 4 weeks.  She started this around April.  In December of 2024 she had a follow-up calprotectin level that was slightly higher at 311.  She was still asymptomatic.  She underwent a follow-up colonoscopy in April of 2025 that showed active inflammation in the right colon and ileum as well as stricturing in the right colon.  She stopped taking Stelara shortly after the colonoscopy and decided she wanted to try some natural therapies.  She was using herbal teas as well as probiotics and probiotics and a follow-up calprotectin level in July of 2025 was actually a little bit higher.    IBD Details:  Dx Date:  July 2022  Disease type/distribution:  Crohn's disease/ileum, cecum, ascending colon involvement  Current Treatment:  No prescription treatments Start Date:   Response:    Optimized:    Adverse reactions:    Prior surgeries:  None  CRP Elevation: Y   calprotectin:  Elevated with active disease  Disease Complications:  Asymptomatic strictures  Extraintestinal manifestations:  Possible joint pains  Prior treatments:   Steroids:  None  5ASA:  None  IMM:  None  TNF Inh:  None   Anti-Integrin:  None   IL 12/23:  Stelara-not effective in spite of every 4 week dosing  KASEY Inh:  None    Previous Clinical Trials:  None    Last Colonoscopy:   April 15, 2025-active inflammation in the ascending colon, terminal ileum,  "stricture in the ascending colon  March 22, 2024-mild ascending colon and IC valve inflammation, normal terminal ileum, deformity of the IC valve from prior inflammation.  December 2022-ulceration in the ascending colon, cecum, and terminal ileum; fibrotic changes in the ascending colon and at the ileocecal valve    Other Endoscopies:  None    Imaging:   MRE:  None   CT:  December 2022- Inflammatory changes ileum and right colon but no stricturing or obstructing issues   Other:  No other pertinent imaging    Pertinent Labs:  Lab Results   Component Value Date    SEDRATE 41 (H) 12/06/2022    CRP 12.2 (H) 02/03/2025     No results found for: "TTGIGA", "IGA"  Lab Results   Component Value Date    TSH 0.480 01/11/2022     Lab Results   Component Value Date    FFUWSAXB00BO 14 (L) 02/03/2025    NQHSHCRI73 432 07/06/2023     Lab Results   Component Value Date    HEPBSAG Non-reactive 08/05/2024    HEPBCAB Non-reactive 08/05/2024    HEPCAB Non-reactive 07/06/2023     Lab Results   Component Value Date    KLL24QASE Negative 01/11/2022     Lab Results   Component Value Date    NIL 0.52368 08/05/2024    MITOGENNIL 9.914 08/05/2024     Lab Results   Component Value Date    TPTMINTERP SEE BELOW 08/01/2022     No results found for: "STOOLCULTURE", "HWMGSRVLTH1N", "NQPJZKFQDY1G", "CDIFFICILEAN", "CDIFFTOX", "CDIFFICILEBY"  Lab Results   Component Value Date    CALPROTECTIN 396.0 (H) 07/23/2025       Therapeutic Drug Monitoring Labs:  No results found for: "PROMETH"  No results found for: "ANSADAINIT", "INFLIXIMAB", "INFLIXINTERP"    Vaccinations:  No results found for: "HEPBSAB"  Lab Results   Component Value Date    HEPAIGG Non-reactive 07/06/2023     Lab Results   Component Value Date    VARICELLAZOS 2333.00 07/06/2023    VARICELLAINT Positive 07/06/2023       There is no immunization history on file for this patient.      Review of Systems  Review of Systems   Constitutional:  Negative for chills, fever and weight loss.   HENT:  " Negative for sore throat.    Eyes:  Positive for pain. Negative for discharge and redness.   Respiratory:  Negative for cough, shortness of breath and wheezing.    Cardiovascular:  Negative for chest pain, orthopnea and leg swelling.   Gastrointestinal:  Negative for abdominal pain, blood in stool, constipation, diarrhea, heartburn, melena, nausea and vomiting.   Genitourinary:  Negative for dysuria, frequency and urgency.   Musculoskeletal:  Negative for back pain, joint pain and myalgias.   Skin:  Negative for itching and rash.   Neurological:  Negative for focal weakness and seizures.   Endo/Heme/Allergies:  Does not bruise/bleed easily.   Psychiatric/Behavioral:  Negative for depression. The patient is not nervous/anxious.        Medical History:   Past Medical History:   Diagnosis Date    Arthritis     Benign paroxysmal vertigo, bilateral     Crohn's disease     Diabetes mellitus, type 2     Glaucoma suspect of both eyes     History of pericarditis 2021    Hypercholesterolemia     Optic atrophy, unspecified        Surgical History:  Past Surgical History:   Procedure Laterality Date    BRAIN SURGERY  2005     SECTION, CLASSIC      COLONOSCOPY N/A 2022    Procedure: COLONOSCOPY Suprep;  Surgeon: Vishal Mariee MD;  Location: [facility];  Service: Endoscopy;  Laterality: N/A;    COLONOSCOPY N/A 2022    Procedure: COLONOSCOPY;  Surgeon: Vishal Mariee MD;  Location: [facility];  Service: Endoscopy;  Laterality: N/A;    COLONOSCOPY N/A 3/22/2024    Procedure: COLONOSCOPY;  Surgeon: James Marie MD;  Location: [address]);  Service: Endoscopy;  Laterality: N/A;  Ref By: ,instr sent via portal,Suprep.patient on Ozempic informed patient to hold for 7 days.AC  3/11/24-LVM regarding last dose Ozempic on or before 3/14/24, instr portal-DS  3/19-precall complete-MS    COLONOSCOPY N/A 4/15/2025    Procedure: COLONOSCOPY;  Surgeon: James Marie MD;  [facility]:  [facility] (4[facility] FLR);  Service: Endoscopy;  Laterality: N/A;  Ozempic / needed latest appt time on a Tuesday /  Frank / suprep (pt requested) /  inst portal & emailed:  [email or web address] / pt denies any constipation since stopping Mounjaro - LW   precall attempted. lvm. kw   precall attempted. lvm. kw  4/10 precall attempt    TUBAL LIGATION         Family History:   Family History   Problem Relation Name Age of Onset    Diabetes Mother      Cataracts Mother      Glaucoma Mother      Stroke Mother      Heart disease Mother          CHF and aorta aneurysm complications at death    No Known Problems Father          trauma led to death at mid 30s    Diabetes Brother          3 brothers , x1 leukemia, x1 leukemia but had cardiac arrest after medication given, was on hospice; x1 abdominal cancer but uncertain type    Breast cancer Maternal Grandmother      Blindness Neg Hx      Macular degeneration Neg Hx      Retinal detachment Neg Hx      Colon cancer Neg Hx      Ovarian cancer Neg Hx         Social History:   Social History     Tobacco Use    Smoking status: Never    Smokeless tobacco: Never   Substance Use Topics    Alcohol use: No    Drug use: No       Allergies: Reviewed    Home Medications:   Medication List with Changes/Refills   Current Medications    ATROPINE 1% (ISOPTO ATROPINE) 1 % DROP    Place 1 drop into both eyes daily as needed (eye pain).    BD ALCOHOL SWABS PADM        BETA-CAROTENE,A,-VITS C AND E (ANTIOXIDANT ORAL)    Take 1 tablet by mouth. Unsure on brand, white bottle    COMBIGAN 0.2-0.5 % DROP    Place 1 drop into both eyes 2 (two) times a day.    DICLOFENAC SODIUM (VOLTAREN) 1 % GEL    Apply 2 g topically 3 (three) times daily.    ERGOCALCIFEROL (ERGOCALCIFEROL) 50,000 UNIT CAP    Take 50,000 Units by mouth every 7 days.    FERROUS SULFATE (FEOSOL) TAB TABLET    Take 1 tablet by mouth daily with breakfast. Elemental QD for 6 weeks if iron improved will stop infusions, not started  "yet    FLUTICASONE PROPIONATE (FLONASE) 50 MCG/ACTUATION NASAL SPRAY    by Each Nostril route as needed.    KETOROLAC 0.5% (ACULAR) 0.5 % DROP    Place 1 drop into the left eye 3 (three) times daily.    LACTOBACILLUS RHAMNOSUS GG (CULTURELLE) 10 BILLION CELL CAPSULE    Take 1 capsule by mouth once daily.    LISINOPRIL (PRINIVIL,ZESTRIL) 5 MG TABLET    Take by mouth.    LORATADINE (CLARITIN) 10 MG TABLET    Take 1 tablet (10 mg total) by mouth once daily.    MECLIZINE (ANTIVERT) 12.5 MG TABLET    Take 1 tablet by mouth daily as needed.    MOUNJARO 2.5 MG/0.5 ML PNIJ    Inject into the skin.    OZEMPIC 0.25 MG OR 0.5 MG (2 MG/3 ML) PEN INJECTOR    once a week.    PRAVASTATIN (PRAVACHOL) 40 MG TABLET        TIMOLOL MALEATE 0.5% (TIMOPTIC) 0.5 % DROP    INSTILL 1 DROP INTO BOTH EYES TWICE DAILY    TRUE METRIX GLUCOSE TEST STRIP STRP        USTEKINUMAB (STELARA) 90 MG/ML SYRG SYRINGE    Inject 90 mg into the skin every 28 days.    WHEAT DEXTRIN (BENEFIBER CLEAR SF, DEXTRIN,) 3 GRAM/3.5 GRAM PWPK    Take 1 Dose by mouth as needed. Per package       Physical Exam:  Vital Signs:  Ht 5' 3" (1.6 m)   Wt 83.9 kg (185 lb)   LMP  (LMP Unknown)   BMI 32.77 kg/m²   Body mass index is 32.77 kg/m².    Physical Exam  Nursing note reviewed.   Constitutional:       General: She is not in acute distress.     Appearance: Normal appearance. She is well-developed. She is not ill-appearing or toxic-appearing.   Skin:     Findings: No rash.   Neurological:      Mental Status: She is alert.   Psychiatric:         Mood and Affect: Mood normal.         Behavior: Behavior normal.         Thought Content: Thought content normal.         Judgment: Judgment normal.         Labs: reviewed and pertinent noted above    Assessment/Plan:  Giovanna Medina is a 55 y.o. female with ileocolonic Crohn's disease. The following issues were addresssed:    1. Crohn's disease of both small and large intestine without complication    2. Iron deficiency anemia " due to chronic blood loss    3. Vitamin D deficiency    4. Immunosuppression due to drug therapy      1. Crohn's disease:  Overall she continues to feel well but there was definitely evidence of active inflammation as well as significant structural damage from prior inflammation.  We discussed the fact that even though she is feeling well the ongoing inflammation puts her at risk of further structural issues that could potentially lead to surgery as well as increasing her potential risk for colon cancer.  She understands that.  At this time she would like to continue trying her natural therapies.  We discussed the pre biotics and probiotics have never been shown to be beneficial.  She we will plan to continue Evinature as well as the Mediterranean diet.  We will plan for a repeat calprotectin level in 3 months.  If it remains significantly elevated she is open to considering other treatment options.  Today we discussed the use of IL 23 inhibitors, Entyvio, and TNF inhibitors.  She would prefer to try Entyvio if there still signs of active inflammation.  I think that is very reasonable.    2. Iron deficiency anemia:  Continue to monitor.      3. Immunosuppression:  Recommend flu shot when available.  Previously recommended shingles vaccination as well.       # IBD specific health maintenance:  Colon cancer surveillance:  Up-to-date    Annual:  - Eye exam:  Recommend follow up with Ophthalmology  - Skin exam (if on IMM/TNF):  Recommend annual skin exam  - reminded pt to use sunblock/hats/sunprotective clothing  - PAP (if immunosuppressed):  Recommend annual pelvic exam    DEXA:  Not applicable    Vitamin D:  Not currently taking supplementation.  Encouraged her to restart this.    Vaccines:    Influenza:  Recommend annual vaccination   Pneumovax:  Recommend vaccination   HAV:  Discuss in the future   HBV:  Discuss in the future   Tdap:  Review in the future   MMR:  Immune   VZV:  Immune   HZV:  Recommend  vaccination   HPV:  Not applicable   Meningococcus:  Not applicable    Follow up: Follow up in about 6 months (around 2/1/2026).    Visit today is associated with current or anticipated ongoing medical care related to this patient's single serious condition/complex condition (Crohn's disease).     Thank you again for sending Giovanna Medina to see Dr. Oneil Marie today at the [facility]. Please don't hesitate to contact Dr. Marie if there are any questions regarding this evaluation, or if you have any other patients with inflammatory bowel disease for whom you would like a consultation. You can reach Dr. Marie at [phone] or by email at [email or web address]    James Marie MD  Department of Gastroenterology  Inflammatory Bowel Disease    The patient location is:  [place]  The chief complaint leading to consultation is:  Crohn's disease    Visit type: audiovisual    Face to Face time with patient:  15  25 minutes of total time spent on the encounter, which includes face to face time and non-face to face time preparing to see the patient (eg, review of tests), Obtaining and/or reviewing separately obtained history, Documenting clinical information in the electronic or other health record, Independently interpreting results (not separately reported) and communicating results to the patient/family/caregiver, or Care coordination (not separately reported).         Each patient to whom he or she provides medical services by telemedicine is:  (1) informed of the relationship between the physician and patient and the respective role of any other health care provider with respect to management of the patient; and (2) notified that he or she may decline to receive medical services by telemedicine and may withdraw from such care at any time.    Notes:

## 2025-08-12 ENCOUNTER — PATIENT MESSAGE (OUTPATIENT)
Dept: OPHTHALMOLOGY | Facility: CLINIC | Age: 55
End: 2025-08-12
Payer: MEDICARE

## 2025-08-13 ENCOUNTER — TELEPHONE (OUTPATIENT)
Dept: DERMATOLOGY | Facility: CLINIC | Age: 55
End: 2025-08-13
Payer: MEDICARE

## 2025-08-13 ENCOUNTER — OFFICE VISIT (OUTPATIENT)
Dept: OPHTHALMOLOGY | Facility: CLINIC | Age: 55
End: 2025-08-13
Payer: MEDICARE

## 2025-08-13 DIAGNOSIS — H54.10 BLINDNESS AND LOW VISION: ICD-10-CM

## 2025-08-13 DIAGNOSIS — H20.021 RECURRENT IRITIS, RIGHT: ICD-10-CM

## 2025-08-13 DIAGNOSIS — H20.9 IRITIS: Primary | ICD-10-CM

## 2025-08-13 DIAGNOSIS — H47.099 COMPRESSIVE OPTIC NEUROPATHY: ICD-10-CM

## 2025-08-13 DIAGNOSIS — H40.1134 PRIMARY OPEN ANGLE GLAUCOMA (POAG) OF BOTH EYES, INDETERMINATE STAGE: ICD-10-CM

## 2025-08-13 PROCEDURE — 99999 PR PBB SHADOW E&M-EST. PATIENT-LVL III: CPT | Mod: PBBFAC,HCWC,, | Performed by: OPHTHALMOLOGY

## 2025-08-13 RX ORDER — ATROPINE SULFATE 10 MG/ML
1 SOLUTION/ DROPS OPHTHALMIC 2 TIMES DAILY
Qty: 5 ML | Refills: 0 | Status: SHIPPED | OUTPATIENT
Start: 2025-08-13

## 2025-08-13 RX ORDER — KETOROLAC TROMETHAMINE 5 MG/ML
1 SOLUTION OPHTHALMIC 4 TIMES DAILY
Qty: 5 ML | Refills: 0 | Status: SHIPPED | OUTPATIENT
Start: 2025-08-13 | End: 2026-08-13

## 2025-08-15 ENCOUNTER — TELEPHONE (OUTPATIENT)
Dept: DERMATOLOGY | Facility: CLINIC | Age: 55
End: 2025-08-15
Payer: MEDICARE

## 2025-08-20 ENCOUNTER — OFFICE VISIT (OUTPATIENT)
Dept: OPHTHALMOLOGY | Facility: CLINIC | Age: 55
End: 2025-08-20
Payer: MEDICARE

## 2025-08-20 DIAGNOSIS — H47.099 COMPRESSIVE OPTIC NEUROPATHY: ICD-10-CM

## 2025-08-20 DIAGNOSIS — H20.00 ACUTE IRITIS, RIGHT EYE: Primary | ICD-10-CM

## 2025-08-20 PROCEDURE — 99999 PR PBB SHADOW E&M-EST. PATIENT-LVL III: CPT | Mod: PBBFAC,HCWC,, | Performed by: OPHTHALMOLOGY

## 2025-08-22 ENCOUNTER — TELEPHONE (OUTPATIENT)
Dept: DERMATOLOGY | Facility: CLINIC | Age: 55
End: 2025-08-22
Payer: MEDICARE

## 2025-08-27 ENCOUNTER — PATIENT MESSAGE (OUTPATIENT)
Dept: GASTROENTEROLOGY | Facility: CLINIC | Age: 55
End: 2025-08-27
Payer: MEDICARE

## 2025-08-27 ENCOUNTER — OFFICE VISIT (OUTPATIENT)
Dept: OPTOMETRY | Facility: CLINIC | Age: 55
End: 2025-08-27
Payer: MEDICARE

## 2025-08-27 DIAGNOSIS — H25.13 NUCLEAR SCLEROTIC CATARACT OF BOTH EYES: ICD-10-CM

## 2025-08-27 DIAGNOSIS — H20.021 RECURRENT IRITIS, RIGHT: Primary | ICD-10-CM

## 2025-08-27 DIAGNOSIS — H54.10 BLINDNESS AND LOW VISION: ICD-10-CM

## 2025-08-27 DIAGNOSIS — H40.023 OPEN ANGLE WITH BORDERLINE FINDINGS AND HIGH GLAUCOMA RISK IN BOTH EYES: ICD-10-CM

## 2025-08-27 DIAGNOSIS — H47.099 COMPRESSIVE OPTIC NEUROPATHY: ICD-10-CM

## 2025-08-27 PROCEDURE — 1159F MED LIST DOCD IN RCRD: CPT | Mod: CPTII,HCWC,S$GLB, | Performed by: OPTOMETRIST

## 2025-08-27 PROCEDURE — 1160F RVW MEDS BY RX/DR IN RCRD: CPT | Mod: CPTII,HCWC,S$GLB, | Performed by: OPTOMETRIST

## 2025-08-27 PROCEDURE — 99204 OFFICE O/P NEW MOD 45 MIN: CPT | Mod: HCWC,S$GLB,, | Performed by: OPTOMETRIST

## 2025-08-27 PROCEDURE — 99999 PR PBB SHADOW E&M-EST. PATIENT-LVL II: CPT | Mod: PBBFAC,HCWC,, | Performed by: OPTOMETRIST
